# Patient Record
Sex: MALE | Race: WHITE | Employment: FULL TIME | URBAN - METROPOLITAN AREA
[De-identification: names, ages, dates, MRNs, and addresses within clinical notes are randomized per-mention and may not be internally consistent; named-entity substitution may affect disease eponyms.]

---

## 2017-07-12 ENCOUNTER — TELEPHONE (OUTPATIENT)
Dept: CARDIOLOGY CLINIC | Age: 32
End: 2017-07-12

## 2017-07-13 NOTE — TELEPHONE ENCOUNTER
Returned patient's call. LVM patient may come into the office in the am tomorrow for a 9 am. Appointment. Advised patient to arrived at 8:30am. Please call the office in the morning if unable to make that date/time.

## 2017-07-19 ENCOUNTER — OFFICE VISIT (OUTPATIENT)
Dept: CARDIOLOGY CLINIC | Age: 32
End: 2017-07-19

## 2017-07-19 VITALS
SYSTOLIC BLOOD PRESSURE: 134 MMHG | RESPIRATION RATE: 16 BRPM | BODY MASS INDEX: 26.25 KG/M2 | HEIGHT: 72 IN | DIASTOLIC BLOOD PRESSURE: 90 MMHG | WEIGHT: 193.8 LBS | HEART RATE: 86 BPM

## 2017-07-19 DIAGNOSIS — I10 ESSENTIAL HYPERTENSION: Primary | ICD-10-CM

## 2017-07-19 RX ORDER — CARVEDILOL 12.5 MG/1
12.5 TABLET ORAL 2 TIMES DAILY
COMMUNITY
Start: 2017-07-11 | End: 2017-08-11

## 2017-07-19 RX ORDER — LISINOPRIL AND HYDROCHLOROTHIAZIDE 20; 25 MG/1; MG/1
1 TABLET ORAL DAILY
Refills: 0 | COMMUNITY
Start: 2017-07-13 | End: 2017-11-01

## 2017-07-19 NOTE — LETTER
10/11/2017 12:03 PM 
 
Mr. Kamini [address] [facility] [address] Dear Kamini Beauchamp: 
 
Please find your most recent results below. Resulted Orders METABOLIC PANEL, COMPREHENSIVE Result Value Ref Range Glucose 92 65 - 99 mg/dL BUN 20 6 - 20 mg/dL Creatinine 1.24 0.76 - 1.27 mg/dL GFR est non-AA 75 >59 mL/min/1.73 GFR est AA 87 >59 mL/min/1.73  
 BUN/Creatinine ratio 16 9 - 20 Sodium 141 134 - 144 mmol/L Potassium 4.2 3.5 - 5.2 mmol/L Chloride 98 96 - 106 mmol/L  
 CO2 26 18 - 29 mmol/L Calcium 9.3 8.7 - 10.2 mg/dL Protein, total 7.0 6.0 - 8.5 g/dL Albumin 4.5 3.5 - 5.5 g/dL GLOBULIN, TOTAL 2.5 1.5 - 4.5 g/dL A-G Ratio 1.8 1.2 - 2.2 Bilirubin, total 0.4 0.0 - 1.2 mg/dL Alk. phosphatase 85 39 - 117 IU/L  
 AST (SGOT) 22 0 - 40 IU/L  
 ALT (SGPT) 30 0 - 44 IU/L Narrative Performed at:  [address]  [id number] : Edy Zhou MD, Phone:  [phone] LIPID PANEL Result Value Ref Range Cholesterol, total 261 (H) 100 - 199 mg/dL Triglyceride 304 (H) 0 - 149 mg/dL HDL Cholesterol 42 >39 mg/dL VLDL, calculated 61 (H) 5 - 40 mg/dL LDL, calculated 158 (H) 0 - 99 mg/dL Narrative Performed at:  [address]  [id number] : Edy Zhou MD, Phone:  [phone] MAGNESIUM Result Value Ref Range Magnesium 2.4 (H) 1.6 - 2.3 mg/dL Narrative Performed at:  [address]  [id number] : Edy Zhou MD, Phone:  [phone] CVD REPORT Result Value Ref Range INTERPRETATION Note Comment:  
   Supplement report is available. Narrative Performed at:  [address] [place] [address]  [id number] : Fadi Hess PhD, Phone:  [phone] RECOMMENDATIONS: 
 I strongly encourage you to work on diet and exercise, your LDL is two points away from needing medication for his high cholesterol. Please call me if you have any questions: [phone] Sincerely, Alicia Brown MD

## 2017-07-19 NOTE — MR AVS SNAPSHOT
Visit Information Date & Time Provider Department Dept. Phone Encounter #  
 7/19/2017  9:00 AM Darwin Washington MD [facility] Sara Perez [phone] [id number] Upcoming Health Maintenance Date Due DTaP/Tdap/Td series (1 - Tdap) 8/18/2004 INFLUENZA AGE 9 TO ADULT 8/1/2017 Allergies as of 7/19/2017  Review Complete On: 7/19/2017 By: Tamir Washington Severity Noted Reaction Type Reactions Penicillins  10/14/2015    Unknown (comments) Current Immunizations  Never Reviewed No immunizations on file. Not reviewed this visit You Were Diagnosed With   
  
 Codes Comments Essential hypertension    -  Primary ICD-10-CM: I10 
ICD-9-CM: 401.9 Vitals BP Pulse Resp Height(growth percentile) Weight(growth percentile) BMI  
 134/90 (BP 1 Location: Left arm, BP Patient Position: Sitting) 86 16 6' (1.829 m) 193 lb 12.8 oz (87.9 kg) 26.28 kg/m2 Smoking Status Never Smoker Vitals History BMI and BSA Data Body Mass Index Body Surface Area  
 26.28 kg/m 2 2.11 m 2 Your Updated Medication List  
  
   
This list is accurate as of: 7/19/17 10:26 AM.  Always use your most recent med list.  
  
  
  
  
 carvedilol 12.5 mg tablet Commonly known as:  Deleta Eleuterio Take 12.5 mg by mouth two (2) times a day. lisinopril-hydroCHLOROthiazide 20-25 mg per tablet Commonly known as:  Dalirving Brazen Take 1 Tab by mouth daily. We Performed the Following AMB POC EKG ROUTINE W/ 12 LEADS, INTER & REP [01581 CPT(R)] LIPID PANEL [50993 CPT(R)] MAGNESIUM [id number] CPT(R)] METABOLIC PANEL, COMPREHENSIVE [57715 CPT(R)] Introducing [facility]! Cory Shelby introduces [employer] patient portal. Now you can access parts of your medical record, email your doctor's office, and request medication refills online.    
 
1. In your internet browser, go to [email or web address] 2. Click on the First Time User? Click Here link in the Sign In box. You will see the New Member Sign Up page. 3. Enter your CrowdHall Access Code exactly as it appears below. You will not need to use this code after youve completed the sign-up process. If you do not sign up before the expiration date, you must request a new code. · [employer] Access Code: [email or web address] Expires: 10/17/2017 10:26 AM 
 
4. Enter the last four digits of your Social Security Number (xxxx) and Date of Birth (mm/dd/yyyy) as indicated and click Submit. You will be taken to the next sign-up page. 5. Create a GitHubt ID. This will be your CrowdHall login ID and cannot be changed, so think of one that is secure and easy to remember. 6. Create a CrowdHall password. You can change your password at any time. 7. Enter your Password Reset Question and Answer. This can be used at a later time if you forget your password. 8. Enter your e-mail address. You will receive e-mail notification when new information is available in [address] 9. Click Sign Up. You can now view and download portions of your medical record. 10. Click the Download Summary menu link to download a portable copy of your medical information. If you have questions, please visit the Frequently Asked Questions section of the CrowdHall website. Remember, [employer]dHall is NOT to be used for urgent needs. For medical emergencies, dial 911. Now available from your iPhone and Android! Please provide this summary of care documentation to your next provider. Your primary care clinician is listed as Glen Chapin. If you have any questions after today's visit, please call [phone].

## 2017-07-19 NOTE — PROGRESS NOTES
[place]: Issac Rivero  [phone]    HPI: Mitchell Bucio, a 33y.o. year-old who presents for evaluation of HTN. Last visit here 2 years ago. He was out of town and went to play golf, he felt bad, off and the BP was up at 220 over 120. He felt tired and lethargy. Had dietary indiscretions before, salt and etoh. He is warm today but otherwise doing ok. BP is down today. He was admitted to the hospital and got IV meds to bring his BP down. But was staying at 150/100. Ended up staying about a day. No palps, no chest pain. Records reviewed. Labs ok, trop neg, and will requested labs. He was able to get in to see [facility] Dr. Debo Reina. She prescribed his meds and she will see him again tomorrow. He is going to have a renal ultrasound. He is expecting this October, boy. Stress level is high. He is changing locations for his job. But he will be moving to a different territory. He has had palpitations before but has not had it that severe. No syncope, near-syncope  Not an exerciser, weight is stable. Not sick at the time. No aches, CK elevated. EKG today is NSR. Records, mychart, labs in 6-8 weeks, fu here 3 mos, record bp daily and keep a log    Assessment/Plan:  1. HTN- very high recently with ER eval, now on lisinoprilHCt and carvedilol  2. Body mass index is 26.28 kg/(m^2). 3. Dyslipidemia ? Will check NMR  4. Palpitations- holter if recurs, no syncope  5. Dyspnea/palpitations- echo to evaluate  6. Primary prevention- Lose it, Mediterannean, Exercise, Lipids, recheck in 6 mos  7. Stress/anxiety- discussed relaxation and exercise, contribution to palps and BP    Fhx mother with HTN, gm with CAD  Soc no tob +etoh 2-3 beers 3 times a week,   He  has no past medical history on file.     Cardiovascular ROS: positive for - dyspnea on exertion and irregular heartbeat  Respiratory ROS: no cough, shortness of breath, or wheezing  Neurological ROS: no TIA or stroke symptoms  All other systems negative except as above. PE  Vitals:    07/19/17 0915   BP: 134/90   Pulse: 86   Resp: 16   Weight: 193 lb 12.8 oz (87.9 kg)   Height: 6' (1.829 m)    Body mass index is 26.28 kg/(m^2). General appearance - alert, well appearing, and in no distress  Mental status - affect appropriate to mood  Eyes - sclera anicteric, moist mucous membranes  Neck - supple, no significant adenopathy  Lymphatics - no  lymphadenopathy  Chest - clear to auscultation, no wheezes, rales or rhonchi  Heart - normal rate, regular rhythm, normal S1, S2, no murmurs, rubs, clicks or gallops  Abdomen - soft, nontender, nondistended, no masses or organomegaly  Back exam - full range of motion, no tenderness  Neurological - cranial nerves II through XII grossly intact, no focal deficit  Musculoskeletal - no muscular tenderness noted, normal strength  Extremities - peripheral pulses normal, no pedal edema  Skin - normal coloration  no rashes    Recent Labs:  No results found for: CHOL, CHOLX, CHLST, CHOLV, 287019, HDL, LDL, LDLC, DLDLP, TGLX, TRIGL, TRIGP, CHHD, CHHDX  Lab Results   Component Value Date/Time    Creatinine 1.30 10/14/2015 01:11 AM     Lab Results   Component Value Date/Time    BUN 26 10/14/2015 01:11 AM     Lab Results   Component Value Date/Time    Potassium 3.6 10/14/2015 01:11 AM     No results found for: HBA1C, HGBE8, CPL7UKVD, WLC6DGSJ  Lab Results   Component Value Date/Time    HGB 15.2 10/14/2015 01:11 AM     Lab Results   Component Value Date/Time    PLATELET 243 47/74/5784 01:11 AM       Reviewed:  No past medical history on file.   History   Smoking Status    Never Smoker   Smokeless Tobacco    Never Used     History   Alcohol Use    0.0 oz/week    0 Standard drinks or equivalent per week     Comment: 2-3 times a week     Allergies   Allergen Reactions    Penicillins Unknown (comments)       Current Outpatient Prescriptions   Medication Sig    carvedilol (COREG) 12.5 mg tablet Take 12.5 mg by mouth two (2) times a day.  lisinopril-hydroCHLOROthiazide (PRINZIDE, ZESTORETIC) 20-25 mg per tablet Take 1 Tab by mouth daily. No current facility-administered medications for this visit.         Alicia Brown MD  [facility] [place]  [address] [facility] [address]  [address]

## 2017-07-28 ENCOUNTER — CLINICAL SUPPORT (OUTPATIENT)
Dept: CARDIOLOGY CLINIC | Age: 32
End: 2017-07-28

## 2017-07-28 DIAGNOSIS — R00.2 PALPITATIONS: Primary | ICD-10-CM

## 2017-07-28 DIAGNOSIS — R06.02 SOB (SHORTNESS OF BREATH): ICD-10-CM

## 2017-07-28 DIAGNOSIS — I10 ESSENTIAL HYPERTENSION: ICD-10-CM

## 2017-08-07 ENCOUNTER — TELEPHONE (OUTPATIENT)
Dept: CARDIOLOGY CLINIC | Age: 32
End: 2017-08-07

## 2017-08-08 NOTE — TELEPHONE ENCOUNTER
Returned patient's call:  Left VMM with the following:  Per Dr. Queenie Bahena patient's echo is ok. Normal systolic function. EF 55%    mild-moderate focal thickening of the anterior  Leaflet. triv MR/TR    Patient may return call with questions or concerns.

## 2017-08-09 ENCOUNTER — TELEPHONE (OUTPATIENT)
Dept: CARDIOLOGY CLINIC | Age: 32
End: 2017-08-09

## 2017-08-10 NOTE — TELEPHONE ENCOUNTER
Called patient regarding recent inquiry. Identity verified. Informed patient that we have his ultrasound results here in the office available for his office visit.

## 2017-10-10 LAB
ALBUMIN SERPL-MCNC: 4.5 G/DL (ref 3.5–5.5)
ALBUMIN/GLOB SERPL: 1.8 {RATIO} (ref 1.2–2.2)
ALP SERPL-CCNC: 85 IU/L (ref 39–117)
ALT SERPL-CCNC: 30 IU/L (ref 0–44)
AST SERPL-CCNC: 22 IU/L (ref 0–40)
BILIRUB SERPL-MCNC: 0.4 MG/DL (ref 0–1.2)
BUN SERPL-MCNC: 20 MG/DL (ref 6–20)
BUN/CREAT SERPL: 16 (ref 9–20)
CALCIUM SERPL-MCNC: 9.3 MG/DL (ref 8.7–10.2)
CHLORIDE SERPL-SCNC: 98 MMOL/L (ref 96–106)
CHOLEST SERPL-MCNC: 261 MG/DL (ref 100–199)
CO2 SERPL-SCNC: 26 MMOL/L (ref 18–29)
CREAT SERPL-MCNC: 1.24 MG/DL (ref 0.76–1.27)
GLOBULIN SER CALC-MCNC: 2.5 G/DL (ref 1.5–4.5)
GLUCOSE SERPL-MCNC: 92 MG/DL (ref 65–99)
HDLC SERPL-MCNC: 42 MG/DL
INTERPRETATION, 910389: NORMAL
LDLC SERPL CALC-MCNC: 158 MG/DL (ref 0–99)
MAGNESIUM SERPL-MCNC: 2.4 MG/DL (ref 1.6–2.3)
POTASSIUM SERPL-SCNC: 4.2 MMOL/L (ref 3.5–5.2)
PROT SERPL-MCNC: 7 G/DL (ref 6–8.5)
SODIUM SERPL-SCNC: 141 MMOL/L (ref 134–144)
TRIGL SERPL-MCNC: 304 MG/DL (ref 0–149)
VLDLC SERPL CALC-MCNC: 61 MG/DL (ref 5–40)

## 2017-10-11 NOTE — PROGRESS NOTES
Please strongly encourage him to work on diet and exercise, per Dr. Santos Strickland his LDL is two points away from needing medication for his high cholesterol.

## 2017-11-01 ENCOUNTER — OFFICE VISIT (OUTPATIENT)
Dept: CARDIOLOGY CLINIC | Age: 32
End: 2017-11-01

## 2017-11-01 VITALS
RESPIRATION RATE: 16 BRPM | OXYGEN SATURATION: 98 % | WEIGHT: 192.4 LBS | BODY MASS INDEX: 26.06 KG/M2 | SYSTOLIC BLOOD PRESSURE: 130 MMHG | HEIGHT: 72 IN | HEART RATE: 67 BPM | DIASTOLIC BLOOD PRESSURE: 80 MMHG

## 2017-11-01 DIAGNOSIS — I10 ESSENTIAL HYPERTENSION: Primary | ICD-10-CM

## 2017-11-01 DIAGNOSIS — E78.5 DYSLIPIDEMIA: ICD-10-CM

## 2017-11-01 RX ORDER — VALSARTAN AND HYDROCHLOROTHIAZIDE 160; 12.5 MG/1; MG/1
1 TABLET, FILM COATED ORAL DAILY
Qty: 30 TAB | Refills: 5 | Status: SHIPPED | OUTPATIENT
Start: 2017-11-01 | End: 2018-04-24 | Stop reason: DRUGHIGH

## 2017-11-01 RX ORDER — CARVEDILOL 12.5 MG/1
12.5 TABLET ORAL 2 TIMES DAILY WITH MEALS
COMMUNITY
End: 2017-11-01 | Stop reason: SDUPTHER

## 2017-11-01 RX ORDER — CARVEDILOL 12.5 MG/1
12.5 TABLET ORAL 2 TIMES DAILY
Qty: 180 TAB | Refills: 3 | Status: SHIPPED | OUTPATIENT
Start: 2017-11-01 | End: 2018-11-08 | Stop reason: SDUPTHER

## 2017-11-01 NOTE — PROGRESS NOTES
[place]:   [phone]    HPI: Sadaf Rizvi, a 27y.o. year-old who presents for follow up regarding his dyslipidemia and HTN. His blood pressure is much better now, readings in the 120/80 range  No headaches or dizziness  No syncope  No palpitations  No chest pain or dyspnea with exertion  No LE edema  Just had his first child on 10/25 - a baby boy, he got RSV but is doing ok now  He is not exercising currently, still try to adjust to his  and life as a dad  Discussed his lipids - he says he eats pretty well, never eats fast food, has a sandwich for lunch daily and a variety of meals for dinner  Sometimes eats pasta but mostly grilled chicken, other meats, vegetables  Only drinks 1-2 alcoholic beverages on the weekend  He reports having a dry cough since starting lisinopril HCT  PCP is now Dr. Emmanuel Hsu. Assessment/Plan:  1. HTN- well controlled on coreg 12.5mg BID and lisinopril HCT but having a cough on lisinopril, will stop lisinopril HCT today and begin valsartan /12.5mg daily, advised him to check his BP daily and call our office if his BP is consistently greater than 140/90, renal US ok, advised him to follow up with PCP in 2 months to check on his blood pressure, he will follow up here with Dr. Mila Benson in 1 year  2. Body mass index is 26.09 kg/(m^2). 3. Dyslipidemia - ,  in 10/17, advised him to begin exercising 30 minutes/day 5 days/week and limiting his carbohydrates some, will plan to check fasting lipids again 1 year  4. Palpitations- none recently, holter if recurs, no syncope  5. Dyspnea - resolved, TTE ok   6.  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 Cardiovascular ROS: negative for chest pain or dyspnea on exertion   Respiratory ROS: positive for cough  Neurological ROS: no TIA or stroke symptoms  All other systems negative except as above. PE  Vitals:    11/01/17 1305 11/01/17 1308   BP: 130/86 130/80   Pulse: 67    Resp: 16    SpO2: 98%    Weight: 192 lb 6.4 oz (87.3 kg)    Height: 6' (1.829 m)     Body mass index is 26.09 kg/(m^2).    General appearance - alert, well appearing, and in no distress  Mental status - affect appropriate to mood  Eyes - sclera anicteric, moist mucous membranes  Neck - supple, no significant adenopathy  Lymphatics - no  lymphadenopathy  Chest - clear to auscultation, no wheezes, rales or rhonchi  Heart - normal rate, regular rhythm, normal S1, S2, no murmurs, rubs, clicks or gallops  Abdomen - soft, nontender, nondistended, no masses or organomegaly  Back exam - full range of motion, no tenderness  Neurological - cranial nerves II through XII grossly intact, no focal deficit  Musculoskeletal - no muscular tenderness noted, normal strength  Extremities - peripheral pulses normal, no pedal edema  Skin - normal coloration  no rashes    Recent Labs:  Lab Results   Component Value Date/Time    Cholesterol, total 261 10/09/2017 08:40 AM    HDL Cholesterol 42 10/09/2017 08:40 AM    LDL, calculated 158 10/09/2017 08:40 AM    Triglyceride 304 10/09/2017 08:40 AM     Lab Results   Component Value Date/Time    Creatinine 1.24 10/09/2017 08:40 AM     Lab Results   Component Value Date/Time    BUN 20 10/09/2017 08:40 AM     Lab Results   Component Value Date/Time    Potassium 4.2 10/09/2017 08:40 AM     No results found for: HBA1C, HGBE8, YOY0WQLP, IIJ9YGYB  Lab Results   Component Value Date/Time    HGB 15.2 10/14/2015 01:11 AM     Lab Results   Component Value Date/Time    PLATELET 047 24/91/6480 01:11 AM       Reviewed:  Past Medical History: Diagnosis Date    Essential hypertension     Hyperlipidemia      History   Smoking Status    Never Smoker   Smokeless Tobacco    Never Used     History   Alcohol Use    0.0 oz/week    0 Standard drinks or equivalent per week     Comment: 2-3 times a week     Allergies   Allergen Reactions    Penicillins Unknown (comments)       Current Outpatient Prescriptions   Medication Sig    valsartan-hydroCHLOROthiazide (DIOVAN-HCT) 160-12.5 mg per tablet Take 1 Tab by mouth daily.  carvedilol (COREG) 12.5 mg tablet Take 1 Tab by mouth two (2) times a day. No current facility-administered medications for this visit.         EDUARD Valencia [facility] [place]  [address], [address] [facility] [place]  [place], [address] [place] [address]

## 2017-11-01 NOTE — PATIENT INSTRUCTIONS
Please stop taking lisinopril HCT and begin valsartan /12.5mg one tablet daily  Please check your blood pressure daily (at least one hour after your morning blood pressure medications.)  Keep a written record of your blood pressure readings and take readings to your primary care visit in 2 months. If your blood pressure is greater than 140/90 consistently please call our office so we can increase your medication. Please call the office prior to your annual appointment and request a lab slip to have your cholesterol rechecked. Please try to exercise 30 minutes/day 5 days/week and cut back on carbohydrates.

## 2017-11-01 NOTE — MR AVS SNAPSHOT
Visit Information Date & Time Provider Department Dept. Phone Encounter #  
 11/1/2017  1:00 PM Maddison Arrington, [phone] Upcoming Health Maintenance Date Due DTaP/Tdap/Td series (1 - Tdap) 8/18/2004 INFLUENZA AGE 9 TO ADULT 8/1/2017 Allergies as of 11/1/2017  Review Complete On: 11/1/2017 By: Leni Alavrez Severity Noted Reaction Type Reactions Penicillins  10/14/2015    Unknown (comments) Current Immunizations  Never Reviewed No immunizations on file. Not reviewed this visit You Were Diagnosed With   
  
 Codes Comments Essential hypertension    -  Primary ICD-10-CM: I10 
ICD-9-CM: 401.9 Vitals BP Pulse Resp Height(growth percentile) Weight(growth percentile) SpO2  
 130/80 (BP 1 Location: Right arm, BP Patient Position: Sitting) 67 16 6' (1.829 m) 192 lb 6.4 oz (87.3 kg) 98% BMI Smoking Status 26.09 kg/m2 Never Smoker Vitals History BMI and BSA Data Body Mass Index Body Surface Area 26.09 kg/m 2 2.11 m 2 Preferred Pharmacy Pharmacy Name Phone [facility] [address] [place], [place] [phone] Your Updated Medication List  
  
   
This list is accurate as of: 11/1/17  1:46 PM.  Always use your most recent med list.  
  
  
  
  
 carvedilol 12.5 mg tablet Commonly known as:  Gaylin Menan Take 1 Tab by mouth two (2) times a day. valsartan-hydroCHLOROthiazide 160-12.5 mg per tablet Commonly known as:  DIOVAN-HCT Take 1 Tab by mouth daily. Prescriptions Sent to Pharmacy Refills  
 valsartan-hydroCHLOROthiazide (DIOVAN-HCT) 160-12.5 mg per tablet 5 Sig: Take 1 Tab by mouth daily.   
 Class: Normal  
 Pharmacy: [facility] [address], [address] [place] [address] [address] AT [facility] of [address] [place] [address] Ph #: [phone] Route: Oral  
 carvedilol (COREG) 12.5 mg tablet 3 Sig: Take 1 Tab by mouth two (2) times a day. Class: Normal  
 Pharmacy: [facility] [address], [address] of [address] [place] [address] Ph #: [phone] Route: Oral  
  
We Performed the Following AMB POC EKG ROUTINE W/ 12 LEADS, INTER & REP [45364 CPT(R)] Introducing [facility]! Chuyita Cid introduces [employer] patient portal. Now you can access parts of your medical record, email your doctor's office, and request medication refills online. 1. In your internet browser, go to [email or web address] 2. Click on the First Time User? Click Here link in the Sign In box. You will see the New Member Sign Up page. 3. Enter your [employer] Access Code exactly as it appears below. You will not need to use this code after youve completed the sign-up process. If you do not sign up before the expiration date, you must request a new code. · [employer] Access Code: [email or web address] Expires: 1/30/2018  1:13 PM 
 
4. Enter the last four digits of your Social Security Number (xxxx) and Date of Birth (mm/dd/yyyy) as indicated and click Submit. You will be taken to the next sign-up page. 5. Create a [employer] ID. This will be your [employer] login ID and cannot be changed, so think of one that is secure and easy to remember. 6. Create a [employer] password. You can change your password at any time. 7. Enter your Password Reset Question and Answer. This can be used at a later time if you forget your password. 8. Enter your e-mail address. You will receive e-mail notification when new information is available in [address] 9. Click Sign Up. You can now view and download portions of your medical record. 10. Click the Download Summary menu link to download a portable copy of your medical information. If you have questions, please visit the Frequently Asked Questions section of the [employer] website. Remember, [employer] is NOT to be used for urgent needs. For medical emergencies, dial 911. Now available from your iPhone and Android! Please provide this summary of care documentation to your next provider. Your primary care clinician is listed as Carmencita Luz. If you have any questions after today's visit, please call [phone].

## 2017-12-11 RX ORDER — HYDROCHLOROTHIAZIDE 12.5 MG/1
12.5 TABLET ORAL DAILY
Qty: 30 TAB | Refills: 2 | Status: SHIPPED | OUTPATIENT
Start: 2017-12-11 | End: 2018-03-05 | Stop reason: SDUPTHER

## 2018-03-06 ENCOUNTER — TELEPHONE (OUTPATIENT)
Dept: CARDIOLOGY CLINIC | Age: 33
End: 2018-03-06

## 2018-03-06 ENCOUNTER — DOCUMENTATION ONLY (OUTPATIENT)
Dept: CARDIOLOGY CLINIC | Age: 33
End: 2018-03-06

## 2018-03-06 DIAGNOSIS — I10 ESSENTIAL HYPERTENSION: Primary | ICD-10-CM

## 2018-03-06 DIAGNOSIS — E78.5 HYPERLIPIDEMIA, UNSPECIFIED HYPERLIPIDEMIA TYPE: ICD-10-CM

## 2018-03-06 NOTE — TELEPHONE ENCOUNTER
Refilled his HCTZ today, please ask him to have BMP and magnesium level checked in the next month since we made changes to his BP meds in 11/17.     Future Appointments  Date Time Provider Nati Kennedy   11/2/2018 9:00 AM Merle Ruiz MD [address] [place] [address] patient. Left a message stating that lab slips will be ,mahi to his home with instruction to please have them drawn due to recent BP medication changes. Advised patient to call with any questions.

## 2018-03-06 NOTE — PROGRESS NOTES
Refilled his HCTZ today, please ask him to have BMP and magnesium level checked in the next month since we made changes to his BP meds in 11/17.     Future Appointments  Date Time Provider Nati Kennedy   11/2/2018 9:00 AM Linda Villeda  [address]

## 2018-03-16 ENCOUNTER — TELEPHONE (OUTPATIENT)
Dept: CARDIOLOGY CLINIC | Age: 33
End: 2018-03-16

## 2018-03-16 LAB
BUN SERPL-MCNC: 20 MG/DL (ref 6–20)
BUN/CREAT SERPL: 19 (ref 9–20)
CALCIUM SERPL-MCNC: 9.8 MG/DL (ref 8.7–10.2)
CHLORIDE SERPL-SCNC: 97 MMOL/L (ref 96–106)
CO2 SERPL-SCNC: 26 MMOL/L (ref 18–29)
CREAT SERPL-MCNC: 1.05 MG/DL (ref 0.76–1.27)
GFR SERPLBLD CREATININE-BSD FMLA CKD-EPI: 107 ML/MIN/1.73
GFR SERPLBLD CREATININE-BSD FMLA CKD-EPI: 92 ML/MIN/1.73
GLUCOSE SERPL-MCNC: 98 MG/DL (ref 65–99)
MAGNESIUM SERPL-MCNC: 2.3 MG/DL (ref 1.6–2.3)
POTASSIUM SERPL-SCNC: 4.3 MMOL/L (ref 3.5–5.2)
SODIUM SERPL-SCNC: 141 MMOL/L (ref 134–144)

## 2018-03-16 NOTE — TELEPHONE ENCOUNTER
----- Message from Kateryna Elizabeth NP sent at 3/16/2018 12:20 PM EDT -----  Labs look good, no changes needed

## 2018-03-16 NOTE — TELEPHONE ENCOUNTER
Called patient. Left a message on self identified voicemail with the following results. Advised patient to call with any questions.

## 2018-04-24 ENCOUNTER — PATIENT MESSAGE (OUTPATIENT)
Dept: CARDIOLOGY CLINIC | Age: 33
End: 2018-04-24

## 2018-04-24 DIAGNOSIS — E78.5 DYSLIPIDEMIA: Primary | ICD-10-CM

## 2018-04-24 DIAGNOSIS — I10 ESSENTIAL HYPERTENSION: ICD-10-CM

## 2018-04-24 RX ORDER — VALSARTAN AND HYDROCHLOROTHIAZIDE 320; 25 MG/1; MG/1
1 TABLET, FILM COATED ORAL DAILY
Qty: 90 TAB | Refills: 3 | Status: SHIPPED | OUTPATIENT
Start: 2018-04-24 | End: 2019-04-05 | Stop reason: SDUPTHER

## 2018-04-24 RX ORDER — AMLODIPINE BESYLATE 5 MG/1
5 TABLET ORAL DAILY
COMMUNITY
End: 2018-04-24 | Stop reason: SDUPTHER

## 2018-04-24 RX ORDER — AMLODIPINE BESYLATE 5 MG/1
5 TABLET ORAL DAILY
Qty: 90 TAB | Refills: 3 | Status: SHIPPED | OUTPATIENT
Start: 2018-04-24 | End: 2019-04-13 | Stop reason: SDUPTHER

## 2018-04-24 RX ORDER — VALSARTAN AND HYDROCHLOROTHIAZIDE 320; 25 MG/1; MG/1
1 TABLET, FILM COATED ORAL DAILY
COMMUNITY
End: 2018-04-24 | Stop reason: SDUPTHER

## 2018-04-24 NOTE — TELEPHONE ENCOUNTER
LVM for patient to return call at earliest convenience. Requested Prescriptions     Signed Prescriptions Disp Refills    valsartan-hydroCHLOROthiazide (DIOVAN HCT) 320-25 mg per tablet 90 Tab 3     Sig: Take 1 Tab by mouth daily. Authorizing Provider: Milton Moore     Ordering User: Aye Aguero    amLODIPine (NORVASC) 5 mg tablet 90 Tab 3     Sig: Take 1 Tab by mouth daily. Authorizing Provider: Milton Moore     Ordering User: Aye Aguero     My chart message sent regarding the following message:    stop HCTZ and increase Diovan HCT to 320/25 one tab daily.  Please advise him to begin amlodipine 5mg daily. He'll need Rx for both. Please ask him to see me in 2 months to check on BP and to have fasting lipids checked just prior to that visit to check his cholesterol.

## 2018-06-21 LAB
CHOLEST SERPL-MCNC: 300 MG/DL (ref 100–199)
HDLC SERPL-MCNC: 51 MG/DL
INTERPRETATION, 910389: NORMAL
LDLC SERPL CALC-MCNC: ABNORMAL MG/DL (ref 0–99)
TRIGL SERPL-MCNC: 410 MG/DL (ref 0–149)
VLDLC SERPL CALC-MCNC: ABNORMAL MG/DL (ref 5–40)

## 2018-06-22 ENCOUNTER — OFFICE VISIT (OUTPATIENT)
Dept: CARDIOLOGY CLINIC | Age: 33
End: 2018-06-22

## 2018-06-22 VITALS
WEIGHT: 203 LBS | HEIGHT: 72 IN | HEART RATE: 73 BPM | SYSTOLIC BLOOD PRESSURE: 120 MMHG | DIASTOLIC BLOOD PRESSURE: 80 MMHG | OXYGEN SATURATION: 98 % | BODY MASS INDEX: 27.5 KG/M2 | RESPIRATION RATE: 16 BRPM

## 2018-06-22 DIAGNOSIS — I10 ESSENTIAL HYPERTENSION: Primary | ICD-10-CM

## 2018-06-22 DIAGNOSIS — E78.1 HYPERTRIGLYCERIDEMIA: ICD-10-CM

## 2018-06-22 DIAGNOSIS — E78.2 MIXED HYPERLIPIDEMIA: ICD-10-CM

## 2018-06-22 NOTE — MR AVS SNAPSHOT
[address] [facility] [place] 
[phone] Patient: Kamini Beauchamp MRN: [id number] NIB:[id number] Visit Information Date & Time Provider Department Dept. Phone Encounter #  
 6/22/2018  1:20 PM Carla Guzmán, [phone] Your Appointments 11/2/2018  9:00 AM  
ESTABLISHED PATIENT with Saeid Boone MD  
[facility] ([facility]) Appt Note: 1 yr f/u [address] [place] [address]  
[address] [facility] [phone] Upcoming Health Maintenance Date Due DTaP/Tdap/Td series (1 - Tdap) 8/18/2004 Influenza Age 5 to Adult 8/1/2018 Allergies as of 6/22/2018  Review Complete On: 6/22/2018 By: Gadiel Pickering Severity Noted Reaction Type Reactions Lisinopril  11/01/2017    Cough Penicillins  10/14/2015    Unknown (comments) Current Immunizations  Never Reviewed No immunizations on file. Not reviewed this visit You Were Diagnosed With   
  
 Codes Comments Hyperlipidemia, unspecified hyperlipidemia type    -  Primary ICD-10-CM: E78.5 ICD-9-CM: 272.4 Hypertriglyceridemia     ICD-10-CM: E78.1 ICD-9-CM: 272.1 Vitals BP Pulse Resp Height(growth percentile) Weight(growth percentile) SpO2  
 120/80 (BP 1 Location: Left arm, BP Patient Position: Sitting) 73 16 6' (1.829 m) 203 lb (92.1 kg) 98% BMI Smoking Status 27.53 kg/m2 Never Smoker Vitals History BMI and BSA Data Body Mass Index Body Surface Area  
 27.53 kg/m 2 2.16 m 2 Preferred Pharmacy Pharmacy Name Phone [facility] [address] [place] [address], [address] [place] [address] [phone] Your Updated Medication List  
  
   
 This list is accurate as of 6/22/18  1:43 PM.  Always use your most recent med list. amLODIPine 5 mg tablet Commonly known as:  Padma Henriquez Take 1 Tab by mouth daily. carvedilol 12.5 mg tablet Commonly known as:  Kelechi Augustaer Take 1 Tab by mouth two (2) times a day. valsartan-hydroCHLOROthiazide 320-25 mg per tablet Commonly known as:  DIOVAN HCT Take 1 Tab by mouth daily. We Performed the Following LIPID PANEL [21640 CPT(R)] METABOLIC PANEL, COMPREHENSIVE [67568 CPT(R)] Patient Instructions Please have fasting labs drawn 1 week before your appointment with Dr. Coleman Hodges Please cut back on carbohydrate intake and increase your exercise to 30-45 minutes/day to help with your triglycerides You can try cutting your carvedilol tablet in half and taking half of a tablet twice daily for two weeks Please check your blood pressure daily for two weeks and then call the office with your readings to see if we can decrease your dose further Introducing [facility]! Dear Patrick Kellogg: Thank you for requesting a [employer] account. Our records indicate that you already have an active [employer] account. You can access your account anytime at [email or web address] Did you know that you can access your hospital and ER discharge instructions at any time in [employer]? You can also review all of your test results from your hospital stay or ER visit. Additional Information If you have questions, please visit the Frequently Asked Questions section of the [employer] website at [email or web address] Remember, [employer] is NOT to be used for urgent needs. For medical emergencies, dial 911. Now available from your iPhone and Android! Please provide this summary of care documentation to your next provider. Your primary care clinician is listed as Greg Calvillo.  If you have any questions after today's visit, please call [phone].

## 2018-06-22 NOTE — PROGRESS NOTES
[place]:   [phone]    HPI: Chichi Calderon, a 27y.o. year-old who presents for follow up regarding his dyslipidemia and HTN. He is feeling well, BP is well controlled at home  Reviewed his recent lab results - TG are more elevated and unable to calculate his LDL due to hypertriglyceridemia  He admits that he is not exercising at this time, [place] [employer] [facility] [employer] [facility]  [employer] occasionally but overall doesn't think that he eats carbs often  Only drinks 1-2 alcoholic beverages on the weekend  Eats a sandwich every day for lunch with macaroni salad and string cheese  Says he doesn't eat many sweets  No headaches, no dizziness or syncope  No palpitations  No chest pain or dyspnea with exertion  No LE edema  Son born in 10/2017 is doing well  Sometimes eats pasta but mostly grilled chicken, other meats, vegetables  His weight is up 11 lbs since his last visit     Assessment/Plan:  1. HTN- well controlled on coreg 12.5mg BID, valsartan /12.5mg daily, amlodipine 5mg daily  -had cough on lisinopril  -he would like to try cutting back on anti-hypertensive medication if possible, advised him to decrease his coreg dose to 6.25mg BID x 2 weeks and check his BP daily x 2 weeks, asked him to call the office in 2 weeks with his readings and then we can decide if his coreg dose can be lowered further  -encouraged him to cut carbs and begin exercising regularly   -he will follow up here with Dr. lAex Foley in November 2018   2. Body mass index is 27.53 kg/(m^2). 3. Dyslipidemia - Lipids 6/18 - , , no LDL, HDL 51, advised him to begin exercising 30-45 minutes/day, advised him to cut back on his carbohydrate intake, advised him to track his carb intake on the [employer] to help keep his carb intake low  -will plan to check fasting lipids in late October 2018 before his follow up visit   4. Palpitations- none recently, holter if recurs, no syncope  5. Dyspnea - resolved, TTE ok   6. Stress/anxiety- not discussed today, encouraged him to begin exercising regularly     Echo 7/17 - LVEF 60 % to 65 %, no WMA, wall thickness was at the upper limits of normal, mild-moderate focal thickening of the anterior leaflet on mitral valve    Fhx mother with HTN, gm with CAD  Soc Hx: no tobacco use, drinks 1-2 drinks/week, no drug use,     He  has a past medical history of Essential hypertension and Hyperlipidemia. Cardiovascular ROS: negative for chest pain or dyspnea on exertion   Respiratory ROS: no cough or wheezing   Neurological ROS: no TIA or stroke symptoms  All other systems negative except as above. PE  Vitals:    06/22/18 1319   BP: 120/80   Pulse: 73   Resp: 16   SpO2: 98%   Weight: 203 lb (92.1 kg)   Height: 6' (1.829 m)    Body mass index is 27.53 kg/(m^2).    General appearance - alert, well appearing, and in no distress  Mental status - affect appropriate to mood  Eyes - sclera anicteric, moist mucous membranes  Neck - supple, no carotid bruits   Lymphatics - not assessed   Chest - clear to auscultation, no wheezes, rales or rhonchi  Heart - normal rate, regular rhythm, normal S1, S2, no murmurs, rubs, clicks or gallops  Abdomen - soft, nontender, nondistended, no masses or organomegaly  Back exam - full range of motion, no tenderness  Neurological - cranial nerves II through XII grossly intact, no focal deficit  Musculoskeletal - no muscular tenderness noted, normal strength  Extremities - peripheral pulses normal, no pedal edema  Skin - normal coloration  no rashes    Recent Labs:  Lab Results   Component Value Date/Time    Cholesterol, total 300 (H) 06/20/2018 08:28 AM    HDL Cholesterol 51 06/20/2018 08:28 AM    LDL, calculated Comment 06/20/2018 08:28 AM    Triglyceride 410 (H) 06/20/2018 08:28 AM     Lab Results   Component Value Date/Time    Creatinine 1.05 03/15/2018 09:21 AM     Lab Results   Component Value Date/Time    BUN 20 03/15/2018 09:21 AM     Lab Results   Component Value Date/Time    Potassium 4.3 03/15/2018 09:21 AM     No results found for: HBA1C, HGBE8, FAA6SWXC, JRN3FWWS  Lab Results   Component Value Date/Time    HGB 15.2 10/14/2015 01:11 AM     Lab Results   Component Value Date/Time    PLATELET 374 81/89/3843 01:11 AM       Reviewed:  Past Medical History:   Diagnosis Date    Essential hypertension     Hyperlipidemia      History   Smoking Status    Never Smoker   Smokeless Tobacco    Never Used     History   Alcohol Use    0.0 oz/week    0 Standard drinks or equivalent per week     Comment: 2-3 times a week     Allergies   Allergen Reactions    Lisinopril Cough    Penicillins Unknown (comments)       Current Outpatient Prescriptions   Medication Sig    valsartan-hydroCHLOROthiazide (DIOVAN HCT) 320-25 mg per tablet Take 1 Tab by mouth daily.  amLODIPine (NORVASC) 5 mg tablet Take 1 Tab by mouth daily.  carvedilol (COREG) 12.5 mg tablet Take 1 Tab by mouth two (2) times a day. No current facility-administered medications for this visit.         Phu Smiley NP  [facility] [employer]  [address], [address] [facility], [address] [place] [address]

## 2018-06-22 NOTE — PATIENT INSTRUCTIONS
Please have fasting labs drawn 1 week before your appointment with Dr. Cristian Tejada  Please cut back on carbohydrate intake and increase your exercise to 30-45 minutes/day to help with your triglycerides    You can try cutting your carvedilol tablet in half and taking half of a tablet twice daily for two weeks  Please check your blood pressure daily for two weeks and then call the office with your readings to see if we can decrease your dose further

## 2018-10-27 LAB
ALBUMIN SERPL-MCNC: 4.9 G/DL (ref 3.5–5.5)
ALBUMIN/GLOB SERPL: 2.2 {RATIO} (ref 1.2–2.2)
ALP SERPL-CCNC: 75 IU/L (ref 39–117)
ALT SERPL-CCNC: 35 IU/L (ref 0–44)
AST SERPL-CCNC: 20 IU/L (ref 0–40)
BILIRUB SERPL-MCNC: 0.4 MG/DL (ref 0–1.2)
BUN SERPL-MCNC: 23 MG/DL (ref 6–20)
BUN/CREAT SERPL: 18 (ref 9–20)
CALCIUM SERPL-MCNC: 9.7 MG/DL (ref 8.7–10.2)
CHLORIDE SERPL-SCNC: 98 MMOL/L (ref 96–106)
CHOLEST SERPL-MCNC: 246 MG/DL (ref 100–199)
CO2 SERPL-SCNC: 25 MMOL/L (ref 20–29)
CREAT SERPL-MCNC: 1.26 MG/DL (ref 0.76–1.27)
GLOBULIN SER CALC-MCNC: 2.2 G/DL (ref 1.5–4.5)
GLUCOSE SERPL-MCNC: 106 MG/DL (ref 65–99)
HDLC SERPL-MCNC: 37 MG/DL
INTERPRETATION, 910389: NORMAL
LDLC SERPL CALC-MCNC: 145 MG/DL (ref 0–99)
POTASSIUM SERPL-SCNC: 4.6 MMOL/L (ref 3.5–5.2)
PROT SERPL-MCNC: 7.1 G/DL (ref 6–8.5)
SODIUM SERPL-SCNC: 138 MMOL/L (ref 134–144)
TRIGL SERPL-MCNC: 322 MG/DL (ref 0–149)
VLDLC SERPL CALC-MCNC: 64 MG/DL (ref 5–40)

## 2018-11-02 ENCOUNTER — OFFICE VISIT (OUTPATIENT)
Dept: CARDIOLOGY CLINIC | Age: 33
End: 2018-11-02

## 2018-11-02 VITALS
WEIGHT: 196.8 LBS | RESPIRATION RATE: 16 BRPM | HEART RATE: 66 BPM | SYSTOLIC BLOOD PRESSURE: 144 MMHG | BODY MASS INDEX: 26.66 KG/M2 | DIASTOLIC BLOOD PRESSURE: 100 MMHG | HEIGHT: 72 IN

## 2018-11-02 DIAGNOSIS — E78.1 HYPERTRIGLYCERIDEMIA: ICD-10-CM

## 2018-11-02 DIAGNOSIS — E78.5 DYSLIPIDEMIA: ICD-10-CM

## 2018-11-02 DIAGNOSIS — E11.59 TYPE 2 DIABETES MELLITUS WITH OTHER CIRCULATORY COMPLICATION, WITHOUT LONG-TERM CURRENT USE OF INSULIN (HCC): ICD-10-CM

## 2018-11-02 DIAGNOSIS — I10 HYPERTENSION, UNSPECIFIED TYPE: Primary | ICD-10-CM

## 2018-11-02 DIAGNOSIS — G47.33 OSA (OBSTRUCTIVE SLEEP APNEA): ICD-10-CM

## 2018-11-02 DIAGNOSIS — I10 ESSENTIAL HYPERTENSION: ICD-10-CM

## 2018-11-02 DIAGNOSIS — E78.2 MIXED HYPERLIPIDEMIA: ICD-10-CM

## 2018-11-02 DIAGNOSIS — R00.2 PALPITATIONS: ICD-10-CM

## 2018-11-02 DIAGNOSIS — R06.09 DOE (DYSPNEA ON EXERTION): ICD-10-CM

## 2018-11-02 DIAGNOSIS — I10 BENIGN ESSENTIAL HYPERTENSION: ICD-10-CM

## 2018-11-02 DIAGNOSIS — E78.5 HYPERLIPIDEMIA, UNSPECIFIED HYPERLIPIDEMIA TYPE: ICD-10-CM

## 2018-11-02 NOTE — PROGRESS NOTES
[place]:   [phone]    HPI: Artem Espinosa, a 26y.o. year-old who presents for follow up regarding his dyslipidemia and HTN. Son is doing great, he is 13 mos now. Walking and getting in to things. He feels like he is better on track, eating better, salads for lunch, etc.   Not exercising. But Tg down about 90 points and Cholesterol is down 50. He has a tough schedule. Up at 10, takes son to , then to work, gets home at 6:30. He is feeling well, BP is well controlled at home  He admits that he is not exercising at this time, [place] [employer] [place]    Says he doesn't eat many sweets  No headaches, no dizziness or syncope, palpitations  No chest pain or dyspnea with exertion, no LE edema  Son born in 10/2017 is doing well  Sometimes eats pasta but mostly grilled chicken, other meats, vegetables  His weight is down about 7 pounds from last visit    Assessment/Plan:  1. HTN- well controlled on coreg 12.5mg BID, valsartan /12.5mg daily, amlodipine 5mg daily  -had cough on lisinopril  -encouraged him to cut carbs and begin exercising regularly   2. Body mass index is 26.69 kg/m². 3. Dyslipidemia - Lipids a little better but not at goal, advised him to begin exercising 30-45 minutes/day, advised him to cut back on his carbohydrate intake, advised him to track his carb intake on the [employer] to help keep his carb intake low  4. Palpitations- none recently, holter if recurs, no syncope  5. Dyspnea - resolved, TTE ok   6.  Stress/anxiety- not discussed today, encouraged him to begin exercising regularly     Echo 7/17 - LVEF 60 % to 65 %, no WMA, wall thickness was at the upper limits of normal, mild-moderate focal thickening of the anterior leaflet on mitral valve    Fhx mother with HTN, gm with CAD  Soc Hx: no tobacco use, drinks 1-2 drinks/week, no drug use,     He  has a past medical history of Essential hypertension and Hyperlipidemia. Cardiovascular ROS: negative for chest pain or dyspnea on exertion   Respiratory ROS: no cough or wheezing   Neurological ROS: no TIA or stroke symptoms  All other systems negative except as above. PE  Vitals:    11/02/18 0916   BP: (!) 144/100   Pulse: 66   Resp: 16   Weight: 196 lb 12.8 oz (89.3 kg)   Height: 6' (1.829 m)    Body mass index is 26.69 kg/m².    General appearance - alert, well appearing, and in no distress  Mental status - affect appropriate to mood  Eyes - sclera anicteric, moist mucous membranes  Neck - supple, no carotid bruits   Lymphatics - not assessed   Chest - clear to auscultation, no wheezes, rales or rhonchi  Heart - normal rate, regular rhythm, normal S1, S2, no murmurs, rubs, clicks or gallops  Abdomen - soft, nontender, nondistended, no masses or organomegaly  Back exam - full range of motion, no tenderness  Neurological - cranial nerves II through XII grossly intact, no focal deficit  Musculoskeletal - no muscular tenderness noted, normal strength  Extremities - peripheral pulses normal, no pedal edema  Skin - normal coloration  no rashes    Recent Labs:  Lab Results   Component Value Date/Time    Cholesterol, total 246 (H) 10/26/2018 08:48 AM    HDL Cholesterol 37 (L) 10/26/2018 08:48 AM    LDL, calculated 145 (H) 10/26/2018 08:48 AM    Triglyceride 322 (H) 10/26/2018 08:48 AM     Lab Results   Component Value Date/Time    Creatinine 1.26 10/26/2018 08:48 AM     Lab Results   Component Value Date/Time    BUN 23 (H) 10/26/2018 08:48 AM     Lab Results   Component Value Date/Time    Potassium 4.6 10/26/2018 08:48 AM     No results found for: HBA1C, HGBE8, PEA0ENAA, CBK2BKKF  Lab Results   Component Value Date/Time    HGB 15.2 10/14/2015 01:11 AM     Lab Results   Component Value Date/Time    PLATELET 849 73/68/7111 01:11 AM       Reviewed:  Past Medical History:   Diagnosis Date    Essential hypertension     Hyperlipidemia      Social History     Tobacco Use Smoking Status Never Smoker   Smokeless Tobacco Never Used     Social History     Substance and Sexual Activity   Alcohol Use Yes    Alcohol/week: 0.0 oz    Comment: 2-3 times a week     Allergies   Allergen Reactions    Lisinopril Cough    Penicillins Unknown (comments)       Current Outpatient Medications   Medication Sig    valsartan-hydroCHLOROthiazide (DIOVAN HCT) 320-25 mg per tablet Take 1 Tab by mouth daily.  amLODIPine (NORVASC) 5 mg tablet Take 1 Tab by mouth daily.  carvedilol (COREG) 12.5 mg tablet Take 1 Tab by mouth two (2) times a day. No current facility-administered medications for this visit.         Lien Fiore MD  [facility] and Vascular [place]  [address], [address] [facility] [address]  [address]

## 2019-04-26 LAB
ALBUMIN SERPL-MCNC: 4.7 G/DL (ref 3.5–5.5)
ALBUMIN/GLOB SERPL: 2.1 {RATIO} (ref 1.2–2.2)
ALP SERPL-CCNC: 96 IU/L (ref 39–117)
ALT SERPL-CCNC: 24 IU/L (ref 0–44)
AST SERPL-CCNC: 18 IU/L (ref 0–40)
BILIRUB SERPL-MCNC: 0.3 MG/DL (ref 0–1.2)
BUN SERPL-MCNC: 26 MG/DL (ref 6–20)
BUN/CREAT SERPL: 19 (ref 9–20)
CALCIUM SERPL-MCNC: 9.7 MG/DL (ref 8.7–10.2)
CHLORIDE SERPL-SCNC: 101 MMOL/L (ref 96–106)
CHOLEST SERPL-MCNC: 267 MG/DL (ref 100–199)
CO2 SERPL-SCNC: 24 MMOL/L (ref 20–29)
CREAT SERPL-MCNC: 1.39 MG/DL (ref 0.76–1.27)
GLOBULIN SER CALC-MCNC: 2.2 G/DL (ref 1.5–4.5)
GLUCOSE SERPL-MCNC: 98 MG/DL (ref 65–99)
HDLC SERPL-MCNC: 40 MG/DL
INTERPRETATION, 910389: NORMAL
LDLC SERPL CALC-MCNC: 180 MG/DL (ref 0–99)
Lab: NORMAL
MAGNESIUM SERPL-MCNC: 2.3 MG/DL (ref 1.6–2.3)
POTASSIUM SERPL-SCNC: 4.7 MMOL/L (ref 3.5–5.2)
PROT SERPL-MCNC: 6.9 G/DL (ref 6–8.5)
SODIUM SERPL-SCNC: 141 MMOL/L (ref 134–144)
TRIGL SERPL-MCNC: 237 MG/DL (ref 0–149)
TSH SERPL DL<=0.005 MIU/L-ACNC: 3.14 UIU/ML (ref 0.45–4.5)
VLDLC SERPL CALC-MCNC: 47 MG/DL (ref 5–40)

## 2019-05-02 ENCOUNTER — OFFICE VISIT (OUTPATIENT)
Dept: CARDIOLOGY CLINIC | Age: 34
End: 2019-05-02

## 2019-05-02 VITALS
BODY MASS INDEX: 26.6 KG/M2 | DIASTOLIC BLOOD PRESSURE: 70 MMHG | HEART RATE: 76 BPM | WEIGHT: 196.4 LBS | HEIGHT: 72 IN | OXYGEN SATURATION: 99 % | SYSTOLIC BLOOD PRESSURE: 110 MMHG | RESPIRATION RATE: 16 BRPM

## 2019-05-02 DIAGNOSIS — E78.5 DYSLIPIDEMIA: Primary | ICD-10-CM

## 2019-05-02 NOTE — PROGRESS NOTES
[place]:   [phone]    HPI: Rahul Huggins, a 26y.o. year-old who presents for follow up regarding his dyslipidemia and HTN. Son is doing great, he is 19 mos now. Walking and getting in to things. Having fun with him. He feels like he is better on track, eating better, salads for lunch, etc.   Reviewed his diet and exercise program.     Not exercising. But TG down about 90 points and Cholesterol is down 50. He has a tough schedule. Up at 10, takes son to , then to work, gets home at 6:30. He is feeling well, BP is well controlled at home  He admits that he is not exercising at this time, [facility] [employer] for [facility]    Says he doesn't eat many sweets  No headaches, no dizziness or syncope, palpitations  No chest pain or dyspnea with exertion, no LE edema  Son born in 10/2017 is doing well  Sometimes eats pasta but mostly grilled chicken, other meats, vegetables  His weight is down about 7 pounds from last visit    Discussion today with BP meds- prefers to avoid changes for now. Exercise, lifestyle. Diet control. Assessment/Plan:  1. HTN- well controlled on coreg 12.5mg BID, valsartan /12.5mg daily, amlodipine 5mg daily  -had cough on lisinopril  -encouraged him to cut carbs and begin exercising regularly   2. Body mass index is 26.64 kg/m². 3. Dyslipidemia - Lipids a little better but not at goal, advised him to begin exercising 30-45 minutes/day, advised him to cut back on his carbohydrate intake, advised him to track his carb intake on the [employer] to help keep his carb intake low  4. Palpitations- none recently, holter if recurs, no syncope  5. Dyspnea - resolved, TTE ok   6.  Stress/anxiety- not discussed today, encouraged him to begin exercising regularly     Echo 7/17 - LVEF 60 % to 65 %, no WMA, wall thickness was at the upper limits of normal, mild-moderate focal thickening of the anterior leaflet on mitral valve  Fhx mother with HTN, gm with CAD  Soc Hx: no tobacco use, drinks 1-2 drinks/week, no drug use,     He  has a past medical history of Essential hypertension and Hyperlipidemia. Cardiovascular ROS: negative for chest pain or dyspnea on exertion   Respiratory ROS: no cough or wheezing   Neurological ROS: no TIA or stroke symptoms  All other systems negative except as above. PE  Vitals:    05/02/19 0858   BP: 110/70   Pulse: 76   Resp: 16   SpO2: 99%   Weight: 196 lb 6.4 oz (89.1 kg)   Height: 6' (1.829 m)    Body mass index is 26.64 kg/m².    General appearance - alert, well appearing, and in no distress  Mental status - affect appropriate to mood  Eyes - sclera anicteric, moist mucous membranes  Neck - supple, no carotid bruits   Lymphatics - not assessed   Chest - clear to auscultation, no wheezes, rales or rhonchi  Heart - normal rate, regular rhythm, normal S1, S2, no murmurs, rubs, clicks or gallops  Abdomen - soft, nontender, nondistended, no masses or organomegaly  Back exam - full range of motion, no tenderness  Neurological - cranial nerves II through XII grossly intact, no focal deficit  Musculoskeletal - no muscular tenderness noted, normal strength  Extremities - peripheral pulses normal, no pedal edema  Skin - normal coloration  no rashes    Recent Labs:  Lab Results   Component Value Date/Time    Cholesterol, total 267 (H) 04/25/2019 09:04 AM    HDL Cholesterol 40 04/25/2019 09:04 AM    LDL, calculated 180 (H) 04/25/2019 09:04 AM    Triglyceride 237 (H) 04/25/2019 09:04 AM     Lab Results   Component Value Date/Time    Creatinine 1.39 (H) 04/25/2019 09:04 AM     Lab Results   Component Value Date/Time    BUN 26 (H) 04/25/2019 09:04 AM     Lab Results   Component Value Date/Time    Potassium 4.7 04/25/2019 09:04 AM     No results found for: HBA1C, HGBE8, HFN2ATAH, EPZ2YINZ  Lab Results   Component Value Date/Time    HGB 15.2 10/14/2015 01:11 AM     Lab Results   Component Value Date/Time    PLATELET 477 10/14/2015 01:11 AM       Reviewed:  Past Medical History:   Diagnosis Date    Essential hypertension     Hyperlipidemia      Social History     Tobacco Use   Smoking Status Never Smoker   Smokeless Tobacco Never Used     Social History     Substance and Sexual Activity   Alcohol Use Yes    Alcohol/week: 0.0 oz    Frequency: 2-3 times a week    Drinks per session: 1 or 2    Binge frequency: Never    Comment: 2-3 times a week     Allergies   Allergen Reactions    Lisinopril Cough    Penicillins Unknown (comments)       Current Outpatient Medications   Medication Sig    amLODIPine (NORVASC) 5 mg tablet TAKE 1 TABLET BY MOUTH DAILY    valsartan-hydroCHLOROthiazide (DIOVAN-HCT) 320-25 mg per tablet TAKE 1 TABLET BY MOUTH DAILY    carvedilol (COREG) 12.5 mg tablet TAKE 1 TABLET BY MOUTH TWICE DAILY     No current facility-administered medications for this visit.         Emily Lackey MD  [facility] and Vascular [place]  [address] [facility] [address]  [address]

## 2019-11-02 LAB
CHOLEST SERPL-MCNC: 260 MG/DL (ref 100–199)
HDLC SERPL-MCNC: 40 MG/DL
INTERPRETATION, 910389: NORMAL
LDLC SERPL CALC-MCNC: 173 MG/DL (ref 0–99)
TRIGL SERPL-MCNC: 233 MG/DL (ref 0–149)
VLDLC SERPL CALC-MCNC: 47 MG/DL (ref 5–40)

## 2019-11-07 ENCOUNTER — OFFICE VISIT (OUTPATIENT)
Dept: CARDIOLOGY CLINIC | Age: 34
End: 2019-11-07

## 2019-11-07 VITALS
SYSTOLIC BLOOD PRESSURE: 121 MMHG | WEIGHT: 194 LBS | BODY MASS INDEX: 26.28 KG/M2 | DIASTOLIC BLOOD PRESSURE: 84 MMHG | RESPIRATION RATE: 15 BRPM | OXYGEN SATURATION: 98 % | HEIGHT: 72 IN | HEART RATE: 78 BPM

## 2019-11-07 DIAGNOSIS — I10 ESSENTIAL HYPERTENSION: Primary | ICD-10-CM

## 2019-11-07 DIAGNOSIS — E78.1 HYPERTRIGLYCERIDEMIA: ICD-10-CM

## 2019-11-07 DIAGNOSIS — G47.33 OSA (OBSTRUCTIVE SLEEP APNEA): ICD-10-CM

## 2019-11-07 DIAGNOSIS — E78.5 DYSLIPIDEMIA: ICD-10-CM

## 2019-11-07 NOTE — PROGRESS NOTES
[facility] [address]:   [phone]    HPI: Delvin Mart, a 37y.o. year-old who presents for follow up regarding his dyslipidemia and HTN. 30 min spent in counseling on healthy lifestyle. He is feeling well  No chest pain or dyspnea with exertion  No palpitations  No dizziness or syncope  No LE edema   Reviewed cholesterol readings - he is not exercising  Discussed ways to incorporate exercise into his daily routine  Strongly encouraged daily exercise for his cholesterol   He is doing well with his diet, eats a chopped salad with chicken breast every day for lunch, dinners are usually chicken or pork loin, eats asparagus, sweet potatoes, etc  Eats a rare pizza on Friday but doesn't eat out a lot   He has cut out sweets, ice cream, etc  Cut out ice cream  BP readings at home - 121/84, 135/85  Commutes 3 days/week   Son born in 10/2017 and is doing well, wife is now home with him. Reviewed need to get exercise in placehis plan will be to get up at 5 go to [place] run on treadmill 30 min and then come home before his wife goes to exercise at 6. Then try for a walk outside at lunch 15 min. He will also keep a food diary for me 2 weeks  before his next visit and we will review when he comes in. Assessment/Plan:  1. HTN- well controlled on coreg 12.5mg BID, valsartan /12.5mg daily in the morning, amlodipine 5mg daily in the evening   -had cough on lisinopril  -well controlled on home readings  2. Body mass index is 26.31 kg/m². 3. Dyslipidemia - ,  on recent lipid panel  -doing well with diet, spent a lot of time discussing how to incorporate exercise into his daily life  -he would like to avoid medication if possible so will work on the exercise portion, advised him to exercise 30-45 minutes/day  -asked him to keep 2 week food diary   -will check fasting lipids in 8 weeks  -he will follow up here in 8-10 weeks   4. Palpitations- none recently, holter if recurs, no syncope  5. Dyspnea - resolved, TTE ok   6. Stress/anxiety- not discussed today, encouraged him to begin exercising regularly     Echo 7/17 - LVEF 60 % to 65 %, no WMA, wall thickness was at the upper limits of normal, mild-moderate focal thickening of the anterior leaflet on mitral valve    Fhx mother with HTN, gm with CAD  Soc Hx: no tobacco use, drinks 1-2 drinks/week, no drug use,     He  has a past medical history of Essential hypertension and Hyperlipidemia. Cardiovascular ROS: negative for chest pain or dyspnea on exertion   Respiratory ROS: no cough or wheezing   Neurological ROS: no TIA or stroke symptoms  All other systems negative except as above. PE  Vitals:    11/07/19 0910 11/07/19 0951   BP: 144/88 121/84   Pulse: 78    Resp: 15    SpO2: 98%    Weight: 194 lb (88 kg)    Height: 6' (1.829 m)     Body mass index is 26.31 kg/m².    General appearance - alert, well appearing, and in no distress  Mental status - affect appropriate to mood  Eyes - sclera anicteric, moist mucous membranes  Neck - supple, no carotid bruits   Lymphatics - not assessed   Chest - clear to auscultation, no wheezes, rales or rhonchi  Heart - normal rate, regular rhythm, normal S1, S2, no murmurs, rubs, clicks or gallops  Abdomen - soft, nontender, nondistended  Back exam - full range of motion  Neurological - cranial nerves II through XII grossly intact, no focal deficit  Musculoskeletal - normal strength  Extremities - peripheral pulses normal, no pedal edema  Skin - normal coloration  no rashes    12 lead ECG: NSR    Recent Labs:  Lab Results   Component Value Date/Time    Cholesterol, total 260 (H) 11/01/2019 09:08 AM    HDL Cholesterol 40 11/01/2019 09:08 AM    LDL, calculated 173 (H) 11/01/2019 09:08 AM    Triglyceride 233 (H) 11/01/2019 09:08 AM     Lab Results   Component Value Date/Time    Creatinine 1.39 (H) 04/25/2019 09:04 AM     Lab Results   Component Value Date/Time    BUN 26 (H) 04/25/2019 09:04 AM Lab Results   Component Value Date/Time    Potassium 4.7 04/25/2019 09:04 AM     No results found for: HBA1C, HGBE8, LDL7BOMV, DEW6GYCH  Lab Results   Component Value Date/Time    HGB 15.2 10/14/2015 01:11 AM     Lab Results   Component Value Date/Time    PLATELET 904 89/96/7515 01:11 AM       Reviewed:  Past Medical History:   Diagnosis Date    Essential hypertension     Hyperlipidemia      Social History     Tobacco Use   Smoking Status Never Smoker   Smokeless Tobacco Never Used     Social History     Substance and Sexual Activity   Alcohol Use Yes    Alcohol/week: 0.0 standard drinks    Frequency: 2-3 times a week    Drinks per session: 1 or 2    Binge frequency: Never    Comment: 2-3 times a week     Allergies   Allergen Reactions    Lisinopril Cough    Penicillins Unknown (comments)       Current Outpatient Medications   Medication Sig    carvedilol (COREG) 12.5 mg tablet TAKE 1 TABLET BY MOUTH TWICE DAILY    amLODIPine (NORVASC) 5 mg tablet TAKE 1 TABLET BY MOUTH DAILY    valsartan-hydroCHLOROthiazide (DIOVAN-HCT) 320-25 mg per tablet TAKE 1 TABLET BY MOUTH DAILY     No current facility-administered medications for this visit.         Fran Gee MD  [facility] and V[facility] [place]  [address] [facility] [address]  [address]

## 2019-11-07 NOTE — PATIENT INSTRUCTIONS
Please exercise at least 30-45 minutes/day   Try to eat almonds instead of cashews  Try to eat 5-7 servings of fruits and vegetables daily   Please have fasting labs drawn in 8 weeks at [facility]   Please bring a 2 week food diary with you to your next appointment  My[employer]itnessPal or [employer] cosmo for your smartphone

## 2019-11-07 NOTE — PROGRESS NOTES
Chief Complaint   Patient presents with    Hypertension     1. Have you been to the ER, urgent care clinic since your last visit? Hospitalized since your last visit? No    2. Have you seen or consulted any other health care providers outside of the [address] [place] [address] since your last visit? Include any pap smears or colon screening. No    3) Do you have an Advance Directive on file?  no

## 2020-01-26 LAB
CHOLEST SERPL-MCNC: 251 MG/DL (ref 100–199)
HDLC SERPL-MCNC: 41 MG/DL
INTERPRETATION, 910389: NORMAL
LDLC SERPL CALC-MCNC: 165 MG/DL (ref 0–99)
TRIGL SERPL-MCNC: 226 MG/DL (ref 0–149)
VLDLC SERPL CALC-MCNC: 45 MG/DL (ref 5–40)

## 2020-01-28 NOTE — PROGRESS NOTES
[facility] [address] [facility]:   [phone]    HPI: Josh Gates, a 37y.o. year-old who presents for follow up regarding his dyslipidemia and HTN. Reviewed lipid results today  He is now exercising 3-4 days/week, does cardio/running for at least 30 minutes and then weights for 30 minutes   Still watching what he eats, eats a chopped salad with chicken breast every day for lunch, hard boiled egg and greek yogurt for breakfast, dinners are usually chicken or pork loin, veggies  Eats pizza on a rare Friday night  BP is well controlled at home  He does not add salt to his food  He has lost 8 lbs since his last visit   No chest pain or dyspnea with exertion  No palpitations  No dizziness or syncope  Commutes 3 days/week   Son born in 10/2017 and is doing well, wife is [facility] home with him. Assessment/Plan:  1. HTN- well controlled on home readings on coreg 12.5mg BID, valsartan /12.5mg daily in the morning, amlodipine 5mg daily in the evening   -had cough on lisinopril  -discussed staying hydrated with 64 oz of water daily  -will plan to check BMP and magnesium level in 6 months  2. Body mass index is 25.23 kg/m². 3. Dyslipidemia - LDL down to 165, TG down to 223  -encouraged him to continue working on diet and exercise  -will recheck lipids in 6 months and follow up with Dr. Trevor Brooks in 6 months   -he would like to avoid medication if possible, discussed considering a coronary calcium scan to look for evidence of early plaque in his 45s  4. Palpitations- none recently, holter if symptoms recur, no syncope  5. Dyspnea - resolved, TTE ok     Echo 7/17 - LVEF 60 % to 65 %, no WMA, wall thickness was at the upper limits of normal, mild-moderate focal thickening of the anterior leaflet on mitral valve    Fhx mother with HTN, gm with CAD  Soc Hx: no tobacco use, drinks 1-2 drinks/week, no drug use,     He  has a past medical history of Essential hypertension and Hyperlipidemia.     Cardiovascular ROS: negative for chest pain or dyspnea on exertion   Respiratory ROS: no cough or wheezing   Neurological ROS: no TIA or stroke symptoms  All other systems negative except as above. PE  Vitals:    01/29/20 1419   BP: 148/86   Pulse: 70   Weight: 186 lb (84.4 kg)   Height: 6' (1.829 m)    Body mass index is 25.23 kg/m².    General appearance - alert, well appearing, and in no distress  Mental status - affect appropriate to mood  Eyes - sclera anicteric, moist mucous membranes  Neck - supple, no carotid bruits   Lymphatics - not assessed   Chest - clear to auscultation, no wheezes, rales or rhonchi  Heart - normal rate, regular rhythm, normal S1, S2, no murmurs, rubs, clicks or gallops  Abdomen - soft, nontender, nondistended  Back exam - full range of motion  Neurological - cranial nerves II through XII grossly intact, no focal deficit  Musculoskeletal - normal strength  Extremities - peripheral pulses normal, no pedal edema  Skin - normal coloration  no rashes    Recent Labs:  Lab Results   Component Value Date/Time    Cholesterol, total 251 (H) 01/25/2020 09:22 AM    HDL Cholesterol 41 01/25/2020 09:22 AM    LDL, calculated 165 (H) 01/25/2020 09:22 AM    Triglyceride 226 (H) 01/25/2020 09:22 AM     Lab Results   Component Value Date/Time    Creatinine 1.39 (H) 04/25/2019 09:04 AM     Lab Results   Component Value Date/Time    BUN 26 (H) 04/25/2019 09:04 AM     Lab Results   Component Value Date/Time    Potassium 4.7 04/25/2019 09:04 AM     No results found for: HBA1C, HGBE8, QTT6GIII, HTE1SNFW  Lab Results   Component Value Date/Time    HGB 15.2 10/14/2015 01:11 AM     Lab Results   Component Value Date/Time    PLATELET 251 56/47/6524 01:11 AM       Reviewed:  Past Medical History:   Diagnosis Date    Essential hypertension     Hyperlipidemia      Social History     Tobacco Use   Smoking Status Never Smoker   Smokeless Tobacco Never Used     Social History     Substance and Sexual Activity   Alcohol Use Yes  Alcohol/week: 0.0 standard drinks    Frequency: 2-3 times a week    Drinks per session: 1 or 2    Binge frequency: Never    Comment: 2-3 times a week     Allergies   Allergen Reactions    Lisinopril Cough    Penicillins Unknown (comments)       Current Outpatient Medications   Medication Sig    carvedilol (COREG) 12.5 mg tablet TAKE 1 TABLET BY MOUTH TWICE DAILY    amLODIPine (NORVASC) 5 mg tablet TAKE 1 TABLET BY MOUTH DAILY    valsartan-hydroCHLOROthiazide (DIOVAN-HCT) 320-25 mg per tablet TAKE 1 TABLET BY MOUTH DAILY     No current facility-administered medications for this visit.         Mat Bejarano, NP  [facility] heart and Vascular [place]  [address], [address], [address] [place] [address] Avenue

## 2020-01-29 ENCOUNTER — OFFICE VISIT (OUTPATIENT)
Dept: CARDIOLOGY CLINIC | Age: 35
End: 2020-01-29

## 2020-01-29 VITALS
HEIGHT: 72 IN | DIASTOLIC BLOOD PRESSURE: 86 MMHG | BODY MASS INDEX: 25.19 KG/M2 | SYSTOLIC BLOOD PRESSURE: 148 MMHG | WEIGHT: 186 LBS | HEART RATE: 70 BPM

## 2020-01-29 DIAGNOSIS — E78.5 DYSLIPIDEMIA: ICD-10-CM

## 2020-01-29 DIAGNOSIS — I10 ESSENTIAL HYPERTENSION: Primary | ICD-10-CM

## 2020-01-29 NOTE — PATIENT INSTRUCTIONS
Please having fasting labs drawn a week or two before your next appointment Please continue to work on diet and exercise Please try to drink at least 64 oz of water daily S:  Chief Complaint   Patient presents with   • Cough       Patient comes in for the following problems:  1. RAD (reactive airway disease), mild persistent, with acute exacerbation    2. Environmental allergies    3. Moderate persistent asthmatic bronchitis with acute exacerbation    4. Need for hepatitis C screening test    5. Visit for screening mammogram    6. Colon cancer screening    7. DDD (degenerative disc disease), lumbar    8. DDD (degenerative disc disease), cervical        Margoth Figueroa is a pleasant 53 year old female who presents today for Reactive airway disease or moderate persistent asthma. She is coughing at this time. She is out of her Dulera as well as Singulair. She needs refills on albuterol. Also multiple screening tests were ordered. Previously she was seen pain management for lumbar and cervical degenerative disc disease. I changed her Dulera to Breo 100, 1 puff daily she also received Zithromax and prednisone for acute exacerbation of her asthma. I did give her a sample of breo to begin in case it's not covered then we will have to find out what is covered for her.    I have reviewed the patient's medications and allergies, past medical, surgical, social and family history, updating these as appropriate.  See Histories section of the EMR for a display of this information.    MEDICATIONS:  Current Outpatient Prescriptions   Medication Sig Dispense Refill   • azithromycin (ZITHROMAX Z-ELVIS) 250 MG tablet Take 1 tablet by mouth daily. 2 tablets first day, then 1 tablet daily for 4 more days 6 tablet 0   • montelukast (SINGULAIR) 10 MG tablet Take 1 tablet by mouth daily. 90 tablet 3   • predniSONE (DELTASONE) 20 MG tablet 2 tablets once daily with food for 5 days 10 tablet 0   • albuterol (PROAIR HFA) 108 (90 Base) MCG/ACT inhaler One or 2 puffs every 4 hours as needed for shortness of breath 8.5 g 11   • FLOVENT  MCG/ACT inhaler INHALE 2 PUFFS INTO THE LUNGS TWICE DAILY 12 g 0   •  ALPRAZolam (XANAX) 0.5 MG tablet Take 0.5 mg by mouth nightly as needed for Sleep.     • Conj Estrogens-Bazedoxifene (DUAVEE) 0.45-20 MG Tab Take by mouth daily.     • promethazine-codeine (PHENERGAN WITH CODEINE) 6.25-10 MG/5ML syrup Take 5 mLs by mouth 4 times daily as needed for Cough. May cause drowsiness 240 mL 0   • gabapentin (NEURONTIN) 300 MG capsule TAKE 1 CAPSULE BY MOUTH THREE TIMES DAILY 270 capsule 3   • fluticasone-vilanterol (BREO ELLIPTA) 100-25 MCG/INH inhaler Inhale 1 puff into the lungs once daily. 60 each 11     No current facility-administered medications for this visit.        ALLERGIES:  ALLERGIES:   Allergen Reactions   • Sulfa Antibiotics HIVES       ROS:  Constitutional:  Denies fever or chills   Eyes:  Denies change in visual acuity   HENT:  Positive nasal congestion or sore throat   Respiratory:  Positive cough or shortness of breath   Cardiovascular:  Denies chest pain or edema   GI:  Denies abdominal pain, nausea, vomiting, bloody stools or diarrhea   :  Denies dysuria   Musculoskeletal:  Denies back pain or joint pain   Integument:  Denies rash   Neurologic:  Denies headache, focal weakness or sensory changes   Endocrine:  Denies polyuria or polydipsia   Lymphatic:  Denies swollen glands   Psychiatric:  Denies depression or anxiety       O:  Visit Vitals   • BP (!) 120/92 ([place] [facility] [place]itting, Cuff Size: Regular)   • Pulse 80   • Ht 5' 7\" (1.702 m)   • Wt 66.2 kg   • BMI 22.87 kg/m2       Constitutional:  Well developed, well nourished, no acute distress, non-toxic appearance   Eyes:  PERRL, conjunctiva normal   HENT:  Atraumatic, external ears normal, nose Congested, oropharynx moist, no pharyngeal exudates. Neck- normal range of motion, no tenderness, supple   Respiratory:  No respiratory distress, normal breath sounds, no rales, no wheezing   Cardiovascular:  Normal rate, normal rhythm, no murmurs, no gallops, no rubs   GI:  Soft, nondistended, normal  bowel sounds, nontender, no organomegaly, no mass, no rebound, no guarding   :  No costovertebral angle tenderness   Musculoskeletal:  No edema, no tenderness, no deformities. Back- no tenderness  Integument:  Well hydrated, no rash   Lymphatic:  No lymphadenopathy noted   Neurologic:  Alert & oriented x 3, CN 2-12 normal, normal motor function, normal sensory function, no focal deficits noted   Psychiatric:  Speech and behavior appropriate         1. RAD (reactive airway disease), mild persistent, with acute exacerbation    2. Environmental allergies    3. Moderate persistent asthmatic bronchitis with acute exacerbation    4. Need for hepatitis C screening test    5. Visit for screening mammogram    6. Colon cancer screening    7. DDD (degenerative disc disease), lumbar    8. DDD (degenerative disc disease), cervical      Orders Placed This Encounter   • Open Access Colonoscopy (27161.01)   • Mammogram - Screening, Bilateral    • Hepatitis C Antibody with Reflex   • CBC & Auto Differential   • Comprehensive Metabolic Panel   • Urinalysis with Micro & Culture if Indicated   • Occult Blood Test Tube   • azithromycin (ZITHROMAX Z-ELVIS) 250 MG tablet   • montelukast (SINGULAIR) 10 MG tablet   • predniSONE (DELTASONE) 20 MG tablet   • albuterol (PROAIR HFA) 108 (90 Base) MCG/ACT inhaler   • fluticasone-vilanterol (BREO ELLIPTA) 100-25 MCG/INH inhaler   • DISCONTD: gabapentin (NEURONTIN) 300 MG capsule        Return in about 4 weeks (around 6/19/2017), or if symptoms worsen or fail to improve.    Instructions provided as documented in the after visit summary.    The patient indicated understanding of the diagnosis and agreed with the plan of care.    Tano Marinelli Jr, MD,

## 2020-03-17 RX ORDER — AMLODIPINE BESYLATE 5 MG/1
TABLET ORAL
Qty: 90 TAB | Refills: 3 | Status: SHIPPED | OUTPATIENT
Start: 2020-03-17 | End: 2021-03-03

## 2020-03-17 RX ORDER — VALSARTAN AND HYDROCHLOROTHIAZIDE 320; 25 MG/1; MG/1
TABLET, FILM COATED ORAL
Qty: 90 TAB | Refills: 3 | Status: SHIPPED | OUTPATIENT
Start: 2020-03-17 | End: 2021-03-03

## 2020-07-28 LAB
ALBUMIN SERPL-MCNC: 4.6 G/DL (ref 4–5)
ALBUMIN/GLOB SERPL: 2 {RATIO} (ref 1.2–2.2)
ALP SERPL-CCNC: 82 IU/L (ref 39–117)
ALT SERPL-CCNC: 24 IU/L (ref 0–44)
AST SERPL-CCNC: 20 IU/L (ref 0–40)
BILIRUB SERPL-MCNC: 0.4 MG/DL (ref 0–1.2)
BUN SERPL-MCNC: 20 MG/DL (ref 6–20)
BUN/CREAT SERPL: 18 (ref 9–20)
CALCIUM SERPL-MCNC: 9.8 MG/DL (ref 8.7–10.2)
CHLORIDE SERPL-SCNC: 98 MMOL/L (ref 96–106)
CHOLEST SERPL-MCNC: 258 MG/DL (ref 100–199)
CO2 SERPL-SCNC: 24 MMOL/L (ref 20–29)
CREAT SERPL-MCNC: 1.13 MG/DL (ref 0.76–1.27)
GLOBULIN SER CALC-MCNC: 2.3 G/DL (ref 1.5–4.5)
GLUCOSE SERPL-MCNC: 100 MG/DL (ref 65–99)
HDLC SERPL-MCNC: 44 MG/DL
INTERPRETATION, 910389: NORMAL
LDLC SERPL CALC-MCNC: ABNORMAL MG/DL (ref 0–99)
MAGNESIUM SERPL-MCNC: 2.2 MG/DL (ref 1.6–2.3)
POTASSIUM SERPL-SCNC: 4.7 MMOL/L (ref 3.5–5.2)
PROT SERPL-MCNC: 6.9 G/DL (ref 6–8.5)
SODIUM SERPL-SCNC: 139 MMOL/L (ref 134–144)
TRIGL SERPL-MCNC: 480 MG/DL (ref 0–149)
VLDLC SERPL CALC-MCNC: ABNORMAL MG/DL (ref 5–40)

## 2020-07-29 ENCOUNTER — OFFICE VISIT (OUTPATIENT)
Dept: CARDIOLOGY CLINIC | Age: 35
End: 2020-07-29

## 2020-07-29 VITALS
HEART RATE: 76 BPM | OXYGEN SATURATION: 98 % | RESPIRATION RATE: 16 BRPM | BODY MASS INDEX: 26.41 KG/M2 | WEIGHT: 195 LBS | DIASTOLIC BLOOD PRESSURE: 80 MMHG | HEIGHT: 72 IN | SYSTOLIC BLOOD PRESSURE: 126 MMHG

## 2020-07-29 DIAGNOSIS — I10 ESSENTIAL HYPERTENSION: Primary | ICD-10-CM

## 2020-07-29 DIAGNOSIS — E78.2 MIXED HYPERLIPIDEMIA: ICD-10-CM

## 2020-07-29 DIAGNOSIS — E11.59 TYPE 2 DIABETES MELLITUS WITH OTHER CIRCULATORY COMPLICATION, WITHOUT LONG-TERM CURRENT USE OF INSULIN (HCC): ICD-10-CM

## 2020-07-29 DIAGNOSIS — R00.2 PALPITATIONS: ICD-10-CM

## 2020-07-29 DIAGNOSIS — R06.09 DOE (DYSPNEA ON EXERTION): ICD-10-CM

## 2020-07-29 DIAGNOSIS — E78.5 HYPERLIPIDEMIA, UNSPECIFIED HYPERLIPIDEMIA TYPE: ICD-10-CM

## 2020-07-29 DIAGNOSIS — I10 HYPERTENSION, UNSPECIFIED TYPE: ICD-10-CM

## 2020-07-29 DIAGNOSIS — G47.33 OSA (OBSTRUCTIVE SLEEP APNEA): ICD-10-CM

## 2020-07-29 DIAGNOSIS — E78.5 DYSLIPIDEMIA: ICD-10-CM

## 2020-07-29 DIAGNOSIS — I10 BENIGN ESSENTIAL HYPERTENSION: ICD-10-CM

## 2020-07-29 DIAGNOSIS — E78.1 HYPERTRIGLYCERIDEMIA: ICD-10-CM

## 2020-07-29 NOTE — PROGRESS NOTES
[facility] [address] [facility]:   [phone]    HPI: Sloane Phillips, a 37y.o. year-old who presents for follow up regarding his dyslipidemia and HTN. Reviewed lipid results today  They look bad. Working from home, gym closed is bad for him. He is working in finance, moderately high stress. Still Walking but 3 days a week. 6 miles a day. Reviewed his lifestyle and labs, that he must get back on track- walking desk standing desk, exercise equipment at home mel[place]. Labs bad- tgup to 480, cont bp meds, recheck in 3 mos. He was: exercising 3-4 days/week, does cardio/running for at least 30 minutes and then weights for 30 minutes    watching what he eats, eats a chopped salad with chicken breast every day for lunch, hard boiled egg and greek yogurt for breakfast, dinners are usually chicken or pork loin, veggies  Eats pizza on a rare Friday night  BP is well controlled at home  He does not add salt to his food    He has gained 7 lbs since his last visit   No chest pain or dyspnea with exertion  No palpitations  No dizziness or syncope  Commutes 3 days/week   Son born in 10/2017 and is doing well, wife is home with him. Expecting again! Assessment/Plan:  1. HTN- well controlled on home readings on coreg 12.5mg BID, valsartan /12.5mg daily in the morning, amlodipine 5mg daily in the evening   -had cough on lisinopril  -discussed staying hydrated with 64 oz of water daily  2. Body mass index is 26.45 kg/m². 3. Dyslipidemia - LDL down to 165, TG down to 223--> now way back up  and   -encouraged him to continue working on diet and exercise  -he would like to avoid medication if possible, discussed considering a coronary calcium scan to look for evidence of early plaque in his 40s  4. Palpitations- none recently, holter if symptoms recur, no syncope  5.  Dyspnea - resolved, TTE ok     Echo 7/17 - LVEF 60 % to 65 %, no WMA, wall thickness was at the upper limits of normal, mild-moderate focal thickening of the anterior leaflet on mitral valve    Fhx mother with HTN, gm with CAD  Soc Hx: no tobacco use, drinks 1-2 drinks/week, no drug use,     He  has a past medical history of Essential hypertension and Hyperlipidemia. Cardiovascular ROS: negative for chest pain or dyspnea on exertion   Respiratory ROS: no cough or wheezing   Neurological ROS: no TIA or stroke symptoms  All other systems negative except as above. PE  Vitals:    07/29/20 1018   BP: 126/80   Pulse: 76   Resp: 16   SpO2: 98%   Weight: 195 lb (88.5 kg)   Height: 6' (1.829 m)    Body mass index is 26.45 kg/m².    General appearance - alert, well appearing, and in no distress  Mental status - affect appropriate to mood  Eyes - sclera anicteric, moist mucous membranes  Neck - supple, no carotid bruits   Lymphatics - not assessed   Chest - clear to auscultation, no wheezes, rales or rhonchi  Heart - normal rate, regular rhythm, normal S1, S2, no murmurs, rubs, clicks or gallops  Abdomen - soft, nontender, nondistended  Back exam - full range of motion  Neurological - cranial nerves II through XII grossly intact, no focal deficit  Musculoskeletal - normal strength  Extremities - peripheral pulses normal, no pedal edema  Skin - normal coloration  no rashes    Recent Labs:  Lab Results   Component Value Date/Time    Cholesterol, total 258 (H) 07/27/2020 07:27 AM    HDL Cholesterol 44 07/27/2020 07:27 AM    LDL, calculated Comment 07/27/2020 07:27 AM    Triglyceride 480 (H) 07/27/2020 07:27 AM     Lab Results   Component Value Date/Time    Creatinine 1.13 07/27/2020 07:27 AM     Lab Results   Component Value Date/Time    BUN 20 07/27/2020 07:27 AM     Lab Results   Component Value Date/Time    Potassium 4.7 07/27/2020 07:27 AM     No results found for: HBA1C, HGBE8, HHS9AWZE, YEU0SLIK  Lab Results   Component Value Date/Time    HGB 15.2 10/14/2015 01:11 AM     Lab Results   Component Value Date/Time    PLATELET 290 34/04/4465 01:11 AM       Reviewed:  Past Medical History:   Diagnosis Date    Essential hypertension     Hyperlipidemia      Social History     Tobacco Use   Smoking Status Never Smoker   Smokeless Tobacco Never Used     Social History     Substance and Sexual Activity   Alcohol Use Yes    Alcohol/week: 0.0 standard drinks    Frequency: 2-3 times a week    Drinks per session: 1 or 2    Binge frequency: Never    Comment: 2-3 times a week     Allergies   Allergen Reactions    Lisinopril Cough    Penicillins Unknown (comments)       Current Outpatient Medications   Medication Sig    amLODIPine (NORVASC) 5 mg tablet TAKE 1 TABLET BY MOUTH DAILY    valsartan-hydroCHLOROthiazide (DIOVAN-HCT) 320-25 mg per tablet TAKE 1 TABLET BY MOUTH DAILY    carvedilol (COREG) 12.5 mg tablet TAKE 1 TABLET BY MOUTH TWICE DAILY     No current facility-administered medications for this visit.         Jr Becerra MD  [facility] heart and Vascular [place]  [address], [address] [facility] [address]  [address]

## 2020-10-23 RX ORDER — CARVEDILOL 12.5 MG/1
TABLET ORAL
Qty: 180 TAB | Refills: 3 | Status: SHIPPED | OUTPATIENT
Start: 2020-10-23 | End: 2021-10-22

## 2021-01-07 ENCOUNTER — TELEPHONE (OUTPATIENT)
Dept: CARDIOLOGY CLINIC | Age: 36
End: 2021-01-07

## 2021-01-07 NOTE — TELEPHONE ENCOUNTER
1/7/2021 at 1:56 left message call to reschedule 1/29/2021 9:20 virtual with Dr. Royce Cole she'll be out of the office

## 2021-01-11 NOTE — TELEPHONE ENCOUNTER
1/11/2021 at 10:15 spoke with patient virtual appointment with Dr. Harry May rescheduled as follows:     Future Appointments   Date Time Provider Nati Kennedy   1/25/2021  1:40 PM MD MARTINE Razo AMB

## 2021-01-25 ENCOUNTER — TELEPHONE (OUTPATIENT)
Dept: CARDIOLOGY CLINIC | Age: 36
End: 2021-01-25

## 2021-03-03 RX ORDER — VALSARTAN AND HYDROCHLOROTHIAZIDE 320; 25 MG/1; MG/1
TABLET, FILM COATED ORAL
Qty: 90 TAB | Refills: 0 | Status: SHIPPED | OUTPATIENT
Start: 2021-03-03 | End: 2021-05-28

## 2021-03-03 RX ORDER — AMLODIPINE BESYLATE 5 MG/1
TABLET ORAL
Qty: 90 TAB | Refills: 0 | Status: SHIPPED | OUTPATIENT
Start: 2021-03-03 | End: 2021-05-28

## 2021-03-03 NOTE — TELEPHONE ENCOUNTER
Requested Prescriptions     Signed Prescriptions Disp Refills    amLODIPine (NORVASC) 5 mg tablet 90 Tab 0     Sig: TAKE 1 TABLET BY MOUTH DAILY     Authorizing Provider: Sandro Sheriff     Ordering User: Alexsander Javed valsartan-hydroCHLOROthiazide (DIOVAN-HCT) 320-25 mg per tablet 90 Tab 0     Sig: TAKE 1 TABLET BY MOUTH DAILY     Authorizing Provider: Sandro Sheriff     Ordering User: Portia Redd     Per verbal orders

## 2021-05-28 RX ORDER — AMLODIPINE BESYLATE 5 MG/1
TABLET ORAL
Qty: 90 TABLET | Refills: 0 | Status: SHIPPED | OUTPATIENT
Start: 2021-05-28 | End: 2021-08-26

## 2021-05-28 RX ORDER — VALSARTAN AND HYDROCHLOROTHIAZIDE 320; 25 MG/1; MG/1
TABLET, FILM COATED ORAL
Qty: 90 TABLET | Refills: 0 | Status: SHIPPED | OUTPATIENT
Start: 2021-05-28 | End: 2021-08-26

## 2021-10-18 ENCOUNTER — TELEPHONE (OUTPATIENT)
Dept: CARDIOLOGY CLINIC | Age: 36
End: 2021-10-18

## 2021-10-18 NOTE — TELEPHONE ENCOUNTER
10/18/2021 at 10:35 left message call to reschedule 10/27/2021 appointment with Stormy Garcia., NP she'll be available for PM clinic only on 10/27/2021

## 2021-11-02 ENCOUNTER — TELEPHONE (OUTPATIENT)
Dept: CARDIOLOGY CLINIC | Age: 36
End: 2021-11-02

## 2021-11-02 NOTE — PROGRESS NOTES
[facility] [place]:   [id number]    HPI: Eliazar Schmidt, a 43y.o. year-old who presents for follow up regarding his dyslipidemia and HTN. BP has been well controlled, SBP around 120mmHg  He is feeling pretty good but reports that he has not been exercising and we discussed getting back into that  His stress level remains moderate, works and has a 3 yo and 2yo at home   Discussed his last lipids and he would like to go ahead and have them checked now for a baseline before getting back into exercising so we will do that today   Eats a chopped salad with chicken breast every day for lunch, has a hard boiled egg and greek yogurt for breakfast, dinners are usually chicken or pork loin and veggies  Eats pizza on a rare Friday night  No fast food  He does not add salt to his food  He has gained 7 lbs since his last visit   No chest pain or dyspnea with exertion  Notices some very brief palpitations at bedtime which are not associated with any high risk features  No dizziness or syncope  Does not see PCP regularly, discussed establish care with PCP for regular follow ups by the time he hits age 34    Assessment/Plan:  1. HTN- well controlled on home readings but with weight gain we discussed cutting back on coreg dose to see if that is contributing to his weight gain  -advised him to reduce his coreg to 6.25mg BID and increase amlodipine to 10mg in the evening  -he will continue valsartan /25 one tab daily in the morning  -had cough on lisinopril  -will check CMP and Mg level   -asked him to call or send me a [employer] message in 2 months with an update on his BP after making the above changes  -he will follow up with me in 1 year   2. Body mass index is 27.4 kg/m².   3. Dyslipidemia - TG up to 480 in 7/20  -encouraged him to continue working on diet and resume exercise  -he would like to avoid medication if possible, previously discussed considering a coronary calcium scan to look for evidence of early plaque in his 45s  -briefly discussed Rx fish oil if his TG remain elevated on next check  -will check fasting lipids  4. Palpitations- brief, no high risk features   5. Vitamin D deficiency - will check Vitamin D level      Echo 7/17 - LVEF 60 % to 65 %, no WMA, wall thickness was at the upper limits of normal, mild-moderate focal thickening of the anterior leaflet on mitral valve    Fhx mother with HTN, gm with CAD  Soc Hx: no tobacco use, drinks 1-2 drinks/week, no drug use,     He  has a past medical history of Essential hypertension and Hyperlipidemia. Cardiovascular ROS: negative for chest pain or dyspnea on exertion   Respiratory ROS: no cough or wheezing   Neurological ROS: no TIA or stroke symptoms  All other systems negative except as above. PE  Vitals:    11/03/21 0909   BP: 130/80   Pulse: 86   Resp: 16   SpO2: 99%   Weight: 202 lb (91.6 kg)   Height: 6' (1.829 m)    Body mass index is 27.4 kg/m².    General appearance - alert, well appearing, and in no distress  Mental status - affect appropriate to mood  Eyes - sclera anicteric, moist mucous membranes  Neck - supple, no carotid bruits   Lymphatics - not assessed   Chest - clear to auscultation, no wheezes, rales or rhonchi  Heart - normal rate, regular rhythm, normal S1, S2, no murmurs, rubs, clicks or gallops  Abdomen - soft, nontender, nondistended  Back exam - full range of motion  Neurological - cranial nerves II through XII grossly intact, no focal deficit  Musculoskeletal - normal strength  Extremities - peripheral pulses normal, no pedal edema  Skin - normal coloration  no rashes    12 lead ECG: NSR    Recent Labs:  Lab Results   Component Value Date/Time    Cholesterol, total 258 (H) 07/27/2020 07:27 AM    HDL Cholesterol 44 07/27/2020 07:27 AM    LDL, calculated Comment 07/27/2020 07:27 AM    Triglyceride 480 (H) 07/27/2020 07:27 AM     Lab Results   Component Value Date/Time    Creatinine 1.13 07/27/2020 07:27 AM     Lab Results   Component Value Date/Time    BUN 20 07/27/2020 07:27 AM     Lab Results   Component Value Date/Time    Potassium 4.7 07/27/2020 07:27 AM     No results found for: HBA1C, CWS0XZPH, GPI5VARP  Lab Results   Component Value Date/Time    HGB 15.2 10/14/2015 01:11 AM     Lab Results   Component Value Date/Time    PLATELET 254 90/09/2427 01:11 AM       Reviewed:  Past Medical History:   Diagnosis Date    Essential hypertension     Hyperlipidemia      Social History     Tobacco Use   Smoking Status Never Smoker   Smokeless Tobacco Never Used     Social History     Substance and Sexual Activity   Alcohol Use Yes    Alcohol/week: 0.0 standard drinks    Comment: 2-3 times a week     Allergies   Allergen Reactions    Lisinopril Cough    Penicillins Unknown (comments)       Current Outpatient Medications   Medication Sig    carvediloL (COREG) 6.25 mg tablet Take 1 Tablet by mouth two (2) times a day.  amLODIPine (NORVASC) 10 mg tablet Take 1 Tablet by mouth daily.  valsartan-hydroCHLOROthiazide (DIOVAN-HCT) 320-25 mg per tablet Take 1 Tablet by mouth daily. No current facility-administered medications for this visit.        Jackson Yeager, EDUARD  [address] [facility] [place]  [address]  [place], [facility] [address]

## 2021-11-03 ENCOUNTER — OFFICE VISIT (OUTPATIENT)
Dept: CARDIOLOGY CLINIC | Age: 36
End: 2021-11-03
Payer: COMMERCIAL

## 2021-11-03 VITALS
BODY MASS INDEX: 27.36 KG/M2 | OXYGEN SATURATION: 99 % | HEART RATE: 86 BPM | RESPIRATION RATE: 16 BRPM | HEIGHT: 72 IN | DIASTOLIC BLOOD PRESSURE: 80 MMHG | WEIGHT: 202 LBS | SYSTOLIC BLOOD PRESSURE: 130 MMHG

## 2021-11-03 DIAGNOSIS — E78.5 DYSLIPIDEMIA: ICD-10-CM

## 2021-11-03 DIAGNOSIS — E55.9 VITAMIN D DEFICIENCY: ICD-10-CM

## 2021-11-03 DIAGNOSIS — R00.2 PALPITATIONS: ICD-10-CM

## 2021-11-03 DIAGNOSIS — I10 ESSENTIAL HYPERTENSION: Primary | ICD-10-CM

## 2021-11-03 DIAGNOSIS — R63.5 WEIGHT GAIN: ICD-10-CM

## 2021-11-03 PROCEDURE — 93000 ELECTROCARDIOGRAM COMPLETE: CPT | Performed by: NURSE PRACTITIONER

## 2021-11-03 PROCEDURE — 99214 OFFICE O/P EST MOD 30 MIN: CPT | Performed by: NURSE PRACTITIONER

## 2021-11-03 RX ORDER — CARVEDILOL 6.25 MG/1
6.25 TABLET ORAL 2 TIMES DAILY
Qty: 180 TABLET | Refills: 0 | Status: SHIPPED | OUTPATIENT
Start: 2021-11-03 | End: 2022-04-21 | Stop reason: DRUGHIGH

## 2021-11-03 RX ORDER — VALSARTAN AND HYDROCHLOROTHIAZIDE 320; 25 MG/1; MG/1
1 TABLET, FILM COATED ORAL DAILY
Qty: 90 TABLET | Refills: 3 | Status: SHIPPED | OUTPATIENT
Start: 2021-11-03

## 2021-11-03 RX ORDER — AMLODIPINE BESYLATE 10 MG/1
10 TABLET ORAL DAILY
Qty: 90 TABLET | Refills: 3 | Status: SHIPPED | OUTPATIENT
Start: 2021-11-03 | End: 2021-12-15 | Stop reason: DRUGHIGH

## 2021-11-03 NOTE — PATIENT INSTRUCTIONS
Please reduce your carvedilol to 6.25mg twice daily (1/2 of 12.5mg tab)  Please increase your amlodipine to 10mg nightly (two 5mg tabs)  Continue the same dose of valsartan HCT    Please have fasting labs drawn sometimes in the next 2 weeks     Send me an update on your blood pressures in 2 months     Please get back to exercising 4-5 days/week for at least 30 minutes

## 2021-11-05 LAB
25(OH)D3+25(OH)D2 SERPL-MCNC: 20.5 NG/ML (ref 30–100)
ALBUMIN SERPL-MCNC: 4.4 G/DL (ref 4–5)
ALBUMIN/GLOB SERPL: 1.7 {RATIO} (ref 1.2–2.2)
ALP SERPL-CCNC: 105 IU/L (ref 44–121)
ALT SERPL-CCNC: 29 IU/L (ref 0–44)
AST SERPL-CCNC: 19 IU/L (ref 0–40)
BILIRUB SERPL-MCNC: 0.3 MG/DL (ref 0–1.2)
BUN SERPL-MCNC: 18 MG/DL (ref 6–20)
BUN/CREAT SERPL: 17 (ref 9–20)
CALCIUM SERPL-MCNC: 9.3 MG/DL (ref 8.7–10.2)
CHLORIDE SERPL-SCNC: 98 MMOL/L (ref 96–106)
CHOLEST SERPL-MCNC: 257 MG/DL (ref 100–199)
CO2 SERPL-SCNC: 26 MMOL/L (ref 20–29)
CREAT SERPL-MCNC: 1.03 MG/DL (ref 0.76–1.27)
GLOBULIN SER CALC-MCNC: 2.6 G/DL (ref 1.5–4.5)
GLUCOSE SERPL-MCNC: 103 MG/DL (ref 65–99)
HDLC SERPL-MCNC: 36 MG/DL
IMP & REVIEW OF LAB RESULTS: NORMAL
LDLC SERPL CALC-MCNC: 147 MG/DL (ref 0–99)
MAGNESIUM SERPL-MCNC: 2.2 MG/DL (ref 1.6–2.3)
POTASSIUM SERPL-SCNC: 4.2 MMOL/L (ref 3.5–5.2)
PROT SERPL-MCNC: 7 G/DL (ref 6–8.5)
SODIUM SERPL-SCNC: 139 MMOL/L (ref 134–144)
TRIGL SERPL-MCNC: 396 MG/DL (ref 0–149)
VLDLC SERPL CALC-MCNC: 74 MG/DL (ref 5–40)

## 2021-12-15 ENCOUNTER — TELEPHONE (OUTPATIENT)
Dept: CARDIOLOGY CLINIC | Age: 36
End: 2021-12-15

## 2021-12-15 RX ORDER — AMLODIPINE BESYLATE 5 MG/1
5 TABLET ORAL DAILY
COMMUNITY
End: 2021-12-15 | Stop reason: SDUPTHER

## 2021-12-15 RX ORDER — AMLODIPINE BESYLATE 5 MG/1
5 TABLET ORAL DAILY
Qty: 90 TABLET | Refills: 0 | Status: SHIPPED | OUTPATIENT
Start: 2021-12-15 | End: 2022-03-04 | Stop reason: SDUPTHER

## 2021-12-15 NOTE — TELEPHONE ENCOUNTER
Verified patient with two patient identifiers. Spoke with patient regarding his medications. Per patient he does not feel well with the med regimen. He went back on taking Amlodipine 5 mg and refill was sent. In absence of Amberly DREWper :          ----- Message from Vick Patel MD sent at 12/15/2021 10:48 AM EST -----  Regarding: FW: Medication Change Update  Please adjust scripts to what he is currently taking. thx        Patient verbalized understanding.

## 2021-12-15 NOTE — TELEPHONE ENCOUNTER
----- Message from Joanne Alfredo MD sent at 12/15/2021 10:48 AM EST -----  Regarding: FW: Medication Change Update  Please adjust scripts to what he is currently taking. thx  ----- Message -----  From: Yenny Odom LPN  Sent: 23/27/9725   8:49 AM EST  To: Joanne Alfredo MD  Subject: FW: Medication Change Update                     Please advise. Marisabel Patrickkristan off today till the rest of the week. [facility] Thank you  ----- Message -----  From: Lorene Adhikari RN  Sent: 12/15/2021   8:19 AM EST  To: Pushpa Hernández LPN  Subject: FW: Medication Change Update                       ----- Message -----  From: Benjie Lawton  Sent: 12/14/2021   8:01 PM EST  To: Exie Rebel Nurse Pool  Subject: Medication Change Update                         Chente Ko,    Happy Holidays! At our last appointment, we discussed adjusting the amount of Coreg and Amlodipine I was taking. After our appointment, I made this adjustment for about 10 days and my blood pressure was ok, however, I just didn't feel as well as I did on the previous regiment. I felt like my energy was lower. Since then, I have been taking the previous regiment of 12.5mg Coreg twice a day (morning and night) and 5mg Amlodipine once a day(night). No changes to the Valsartan/HCTZ. Blood pressure has been good. I wanted to update you on this and also see if you could adjust the prescription amount for those two medications back to the previous regiment so the dosage is correct. I believe the Amlodipine is scheduled to refill soon. The Coreg was refilled right before our appointment. Please let me know if you have any questions.   Thank you, Lázaro Benitez

## 2022-01-24 RX ORDER — CARVEDILOL 12.5 MG/1
TABLET ORAL
Qty: 180 TABLET | Refills: 0 | Status: SHIPPED | OUTPATIENT
Start: 2022-01-24 | End: 2022-04-21 | Stop reason: SDUPTHER

## 2022-03-04 RX ORDER — AMLODIPINE BESYLATE 5 MG/1
5 TABLET ORAL DAILY
Qty: 90 TABLET | Refills: 0 | Status: SHIPPED | OUTPATIENT
Start: 2022-03-04

## 2022-03-04 RX ORDER — AMLODIPINE BESYLATE 5 MG/1
5 TABLET ORAL DAILY
Qty: 90 TABLET | Refills: 0 | Status: SHIPPED | OUTPATIENT
Start: 2022-03-04 | End: 2022-05-25

## 2022-04-21 RX ORDER — CARVEDILOL 12.5 MG/1
12.5 TABLET ORAL 2 TIMES DAILY
Qty: 180 TABLET | Refills: 2 | Status: SHIPPED | OUTPATIENT
Start: 2022-04-21

## 2022-05-25 DIAGNOSIS — I10 ESSENTIAL HYPERTENSION: Primary | ICD-10-CM

## 2022-05-25 RX ORDER — AMLODIPINE BESYLATE 5 MG/1
5 TABLET ORAL DAILY
Qty: 90 TABLET | Refills: 0 | Status: SHIPPED | OUTPATIENT
Start: 2022-05-25 | End: 2022-08-09

## 2022-05-25 NOTE — TELEPHONE ENCOUNTER
Refilled per VO per NP    Future Appointments   Date Time Provider Nati Kennedy   11/1/2022  9:40 AM Aislinn Miranda, EDUARD FOY AMB

## 2022-07-11 ENCOUNTER — TELEPHONE (OUTPATIENT)
Dept: CARDIOLOGY CLINIC | Age: 37
End: 2022-07-11

## 2022-07-11 NOTE — TELEPHONE ENCOUNTER
TABBYM requesting a return call to reschedule 11/1/22 appointment with Janette Lopez NP; Provider covering [facility].

## 2022-08-08 DIAGNOSIS — I10 ESSENTIAL HYPERTENSION: ICD-10-CM

## 2022-08-09 RX ORDER — AMLODIPINE BESYLATE 5 MG/1
5 TABLET ORAL DAILY
Qty: 90 TABLET | Refills: 0 | Status: SHIPPED | OUTPATIENT
Start: 2022-08-09

## 2022-11-05 DIAGNOSIS — I10 ESSENTIAL HYPERTENSION: ICD-10-CM

## 2022-11-08 RX ORDER — AMLODIPINE BESYLATE 5 MG/1
5 TABLET ORAL DAILY
Qty: 30 TABLET | Refills: 0 | Status: SHIPPED | OUTPATIENT
Start: 2022-11-08

## 2022-11-08 NOTE — TELEPHONE ENCOUNTER
Refilled for 30 days per VO per Md  Must make appointment for further refills  No future appointments.

## 2022-12-05 DIAGNOSIS — I10 ESSENTIAL HYPERTENSION: ICD-10-CM

## 2022-12-06 RX ORDER — AMLODIPINE BESYLATE 5 MG/1
5 TABLET ORAL DAILY
Qty: 90 TABLET | OUTPATIENT
Start: 2022-12-06

## 2022-12-08 ENCOUNTER — TELEPHONE (OUTPATIENT)
Dept: CARDIOLOGY CLINIC | Age: 37
End: 2022-12-08

## 2022-12-08 DIAGNOSIS — E55.9 VITAMIN D DEFICIENCY: ICD-10-CM

## 2022-12-08 DIAGNOSIS — R00.2 PALPITATIONS: ICD-10-CM

## 2022-12-08 DIAGNOSIS — E78.5 DYSLIPIDEMIA: Primary | ICD-10-CM

## 2022-12-08 NOTE — TELEPHONE ENCOUNTER
Patient has an appt on Tuesday with Sabino Carter. Wants to know if she should have bloodwork prior to the appointment. States that he can come by the office to  the slip. Requests a call back at [phone].     Thanks,  Arvind Maldonado

## 2022-12-08 NOTE — TELEPHONE ENCOUNTER
He needs a CMP, CBC , fasting lipids and vitamin D level    Future Appointments   Date Time Provider Nati Kennedy   12/13/2022  9:40 AM Klaus Miranda, EDUARD FOY AMB

## 2022-12-13 ENCOUNTER — OFFICE VISIT (OUTPATIENT)
Dept: CARDIOLOGY CLINIC | Age: 37
End: 2022-12-13
Payer: COMMERCIAL

## 2022-12-13 ENCOUNTER — TELEPHONE (OUTPATIENT)
Dept: CARDIOLOGY CLINIC | Age: 37
End: 2022-12-13

## 2022-12-13 VITALS
DIASTOLIC BLOOD PRESSURE: 80 MMHG | OXYGEN SATURATION: 99 % | HEART RATE: 83 BPM | WEIGHT: 201 LBS | SYSTOLIC BLOOD PRESSURE: 130 MMHG | HEIGHT: 72 IN | BODY MASS INDEX: 27.22 KG/M2 | RESPIRATION RATE: 18 BRPM

## 2022-12-13 DIAGNOSIS — E55.9 VITAMIN D DEFICIENCY: ICD-10-CM

## 2022-12-13 DIAGNOSIS — E78.5 DYSLIPIDEMIA: ICD-10-CM

## 2022-12-13 DIAGNOSIS — I10 ESSENTIAL HYPERTENSION: ICD-10-CM

## 2022-12-13 DIAGNOSIS — R06.83 SNORING: ICD-10-CM

## 2022-12-13 DIAGNOSIS — R00.2 PALPITATIONS: Primary | ICD-10-CM

## 2022-12-13 PROCEDURE — 93000 ELECTROCARDIOGRAM COMPLETE: CPT | Performed by: NURSE PRACTITIONER

## 2022-12-13 PROCEDURE — 99214 OFFICE O/P EST MOD 30 MIN: CPT | Performed by: NURSE PRACTITIONER

## 2022-12-13 PROCEDURE — 3074F SYST BP LT 130 MM HG: CPT | Performed by: NURSE PRACTITIONER

## 2022-12-13 PROCEDURE — 3078F DIAST BP <80 MM HG: CPT | Performed by: NURSE PRACTITIONER

## 2022-12-13 RX ORDER — AMLODIPINE BESYLATE 5 MG/1
5 TABLET ORAL DAILY
Qty: 90 TABLET | Refills: 3 | Status: SHIPPED | OUTPATIENT
Start: 2022-12-13

## 2022-12-13 RX ORDER — CARVEDILOL 12.5 MG/1
12.5 TABLET ORAL 2 TIMES DAILY
Qty: 180 TABLET | Refills: 3 | Status: SHIPPED | OUTPATIENT
Start: 2022-12-13

## 2022-12-13 RX ORDER — GLUCOSAMINE SULFATE 1500 MG
1000 POWDER IN PACKET (EA) ORAL DAILY
COMMUNITY

## 2022-12-13 NOTE — PROGRESS NOTES
From: Shantelle Garcia RN   Sent: 12/13/2022  10:29 AM EST   To: Benton Mckeon, NP would like to have a 2 week loop monitor mailed to patient. dx palpitations. Thanks!    George Paget

## 2022-12-13 NOTE — TELEPHONE ENCOUNTER
Enrolled with [employer] - Ordered and being shipped to patient's home address on file. ETA within 5-7 business days. Message  Received: Today  Lupe Gutierrez, JUDIT Bishop; Devere Ganser., NP would like to have a 2 week loop monitor mailed to patient. dx palpitations. Thanks!    Obie Miller

## 2022-12-13 NOTE — PATIENT INSTRUCTIONS
Please consider having a coronary calcium scan (heart scan) done to look for evidence of early plaque in the arteries of your heart. You should call [phone] to schedule this. This test is not covered by insurance and will cost you approximately $129 this month. Please let the imaging center know that you are a patient here so they can send us the results.     Please make an appointment with sleep medicine to be evaluated today    Restart vitamin D 2000 units daily     I'll see you in the office after your testing is complete

## 2022-12-13 NOTE — PROGRESS NOTES
[facility] [place]:   [id number]    HPI: Charley Lindsey, a 42y.o. year-old who presents for follow up regarding his dyslipidemia and HTN. BP has been well controlled, felt worse on lower dose of coreg so back on coreg 12.5mg BID  Discussed lipid results, TG down with diet and exercise but LDL is up   Discussed proceeding with coronary calcium scan at this point to look for early plaque to help guide therapy  He is feeling pretty good but reports that getting time to exercise is hard  His stress level remains moderate, works and has a 12 yo and 3 yo at home   No chest pain or dyspnea with exertion  Has been having more palpitations recently which are concerning to him, no high risk features but occur intermittently, has noticed them more when he hasn't slept well or drank alcohol  He does snore but denies morning headaches, has some \"lethargy\" during the day   No dizziness or lightheadedness  No LE edema     Assessment/Plan:    1. HTN- well controlled, continue coreg 12.5mg BID, amlodipine 5mg in the evening and valsartan HCT, briefly discussed stopping HCTZ to see if palpitations improved but will proceed with loop monitor as below first   -had cough on lisinopril   2. Body mass index is 27.26 kg/m². Working on diet and exercise   3. Dyslipidemia - TG up to 480 in 7/20, now  but LDL up to 161  -encouraged him to continue working on diet and exercise  -he would like to avoid medication if possible, recommended he proceed with coronary calcium scan to look for evidence of early plaque to help guide therapy for his dyslipidemia   4. Palpitations- will order 2 week loop monitor to assess for arrhythmias, he will follow up with me in 2 months    5. Vitamin D deficiency - level 28.6, advised him to increase his vitamin D to 2000 units daily   6. Mitral valve thickening - will discuss repeating TTE at annual visit to reassess   7.   Snoring - with palpitations and HTN will refer him to sleep medicine for MILENA evaluation      Echo 7/17 - LVEF 60 % to 65 %, no WMA, wall thickness was at the upper limits of normal, mild-moderate focal thickening of the anterior leaflet on mitral valve    Fhx mother with HTN, gm with CAD  Soc Hx: no tobacco use, drinks 1-2 drinks/week, no drug use,     He  has a past medical history of Essential hypertension and Hyperlipidemia. Cardiovascular ROS: negative for chest pain or dyspnea on exertion   Respiratory ROS: no cough or wheezing   Neurological ROS: no TIA or stroke symptoms  All other systems negative except as above. PE  Vitals:    12/13/22 0941   BP: 130/80   Pulse: 83   Resp: 18   SpO2: 99%   Weight: 201 lb (91.2 kg)   Height: 6' (1.829 m)      Body mass index is 27.26 kg/m².    General appearance - alert, well appearing, and in no distress  Mental status - affect appropriate to mood  Eyes - sclera anicteric, moist mucous membranes  Neck - supple, no carotid bruits   Lymphatics - not assessed   Chest - clear to auscultation, no wheezes, rales or rhonchi  Heart - normal rate, regular rhythm, normal S1, S2, no murmurs, rubs, clicks or gallops  Abdomen - soft, nontender, nondistended  Back exam - full range of motion  Neurological - cranial nerves II through XII grossly intact, no focal deficit  Musculoskeletal - normal strength  Extremities - peripheral pulses normal, no pedal edema  Skin - normal coloration  no rashes    12 lead ECG: NSR    Recent Labs:  Lab Results   Component Value Date/Time    Cholesterol, total 253 (H) 12/09/2022 09:38 AM    HDL Cholesterol 44 12/09/2022 09:38 AM    LDL, calculated 161 (H) 12/09/2022 09:38 AM    Triglyceride 240 (H) 12/09/2022 09:38 AM    CHOL/HDL Ratio 5.8 (H) 12/09/2022 09:38 AM     Lab Results   Component Value Date/Time    Creatinine 1.21 12/09/2022 09:38 AM     Lab Results   Component Value Date/Time    BUN 22 (H) 12/09/2022 09:38 AM     Lab Results   Component Value Date/Time    Potassium 4.0 12/09/2022 09:38 AM     No results found for: HBA1C, MGI1EMYX, OVS7YEBQ  Lab Results   Component Value Date/Time    HGB 14.3 12/09/2022 09:38 AM     Lab Results   Component Value Date/Time    PLATELET 812 20/86/0335 09:38 AM       Reviewed:  Past Medical History:   Diagnosis Date    Essential hypertension     Hyperlipidemia      Social History     Tobacco Use   Smoking Status Never   Smokeless Tobacco Never     Social History     Substance and Sexual Activity   Alcohol Use Yes    Alcohol/week: 0.0 standard drinks    Comment: 2-3 times a week     Allergies   Allergen Reactions    Lisinopril Cough    Penicillins Unknown (comments)       Current Outpatient Medications   Medication Sig    cholecalciferol (Vitamin D3) 25 mcg (1,000 unit) cap Take 1,000 Units by mouth daily. valsartan-hydroCHLOROthiazide (DIOVAN-HCT) 320-25 mg per tablet TAKE 1 TABLET BY MOUTH DAILY    carvediloL (COREG) 12.5 mg tablet TAKE 1 TABLET BY MOUTH TWICE DAILY    amLODIPine (NORVASC) 5 mg tablet Take 1 Tablet by mouth daily. No current facility-administered medications for this visit.        Erasmo Merida, NP  [address] [facility] heart and [facility] [place]  [address]

## 2022-12-15 ENCOUNTER — TELEPHONE (OUTPATIENT)
Dept: SLEEP MEDICINE | Age: 37
End: 2022-12-15

## 2022-12-30 ENCOUNTER — PATIENT MESSAGE (OUTPATIENT)
Dept: SLEEP MEDICINE | Age: 37
End: 2022-12-30

## 2023-01-20 ENCOUNTER — OFFICE VISIT (OUTPATIENT)
Dept: SLEEP MEDICINE | Age: 38
End: 2023-01-20
Payer: COMMERCIAL

## 2023-01-20 VITALS
DIASTOLIC BLOOD PRESSURE: 77 MMHG | TEMPERATURE: 98.2 F | HEART RATE: 75 BPM | BODY MASS INDEX: 27.16 KG/M2 | SYSTOLIC BLOOD PRESSURE: 117 MMHG | HEIGHT: 72 IN | WEIGHT: 200.5 LBS | OXYGEN SATURATION: 100 %

## 2023-01-20 DIAGNOSIS — I10 PRIMARY HYPERTENSION: ICD-10-CM

## 2023-01-20 DIAGNOSIS — G47.33 OSA (OBSTRUCTIVE SLEEP APNEA): Primary | ICD-10-CM

## 2023-01-20 NOTE — PROGRESS NOTES
[address] [facility] [place], [address]  Tel.  [phone]  Fax. [address]  [place], [address] [facility]  Tel.  [phone]  Fax. [phone] [address] [facility] [address]  Tel.  [phone]  Fax. [phone]       Yao Murphy is a 42y.o. year old male referred by Johnnie Coreas NP   for evaluation of a sleep disorder. ASSESSMENT/PLAN:      ICD-10-CM ICD-9-CM    1. MILENA (obstructive sleep apnea)  G47.33 327.23 SLEEP STUDY UNATTENDED, 4 CHANNEL      2. Primary hypertension  I10 401.9       3. BMI 27.0-27.9,adult  Z68.27 V85.23           Patient has a history and examination consistent with the diagnosis of sleep apnea. Follow-up and Dispositions    Return for telephone follow-up after testing is completed. * The patient currently has a Low Risk for having sleep apnea. STOP-BANG score 3.    * Sleep testing was ordered for initial evaluation. Orders Placed This Encounter    SLEEP STUDY UNATTENDED, 4 CHANNEL     Order Specific Question:   Reason for Exam     Answer:   MILENA       * He was provided information on sleep apnea including corresponding risk factors and the importance of proper treatment. * Treatment options were reviewed in detail, he would like to proceed with OAT Kendy AGUILAR Camejo) / PAP therapy. * The patient was counseled regarding proper sleep hygiene, with emphasis on ensuring sufficient total sleep time; safe driving and the benefits of exercise and weight loss. * All of his questions were addressed. 2. Hypertension -  continue on current regimen - amLODIPine (NORVASC) 5 mg tablet, carvediloL (COREG) 12.5 mg tablet BID and valsartan-hydroCHLOROthiazide (DIOVAN-HCT) 320-25 mg per tablet, he will continue to monitor his BP and follow up with his primary care provider for reevaluation/adjustment of medications if warranted.   I have reviewed the relationship between hypertension as it relates to sleep-disordered breathing. 3. Recommended a dedicated weight loss program through appropriate diet and exercise regimen as significant weight reduction has been shown to reduce severity of obstructive sleep apnea. SUBJECTIVE/OBJECTIVE:    Deepali Galeano is an 42 y.o. male referred for evaluation for a sleep disorder. He complains of snoring associated with awakening in the middle of the night because of no specific reason. Symptoms began 2 years ago, gradually worsening since that time. He usually can fall asleep in 5 minutes. Family or house members note snoring. He denies of symptoms indicative of cataplexy, sleep paralysis or sleep related hallucinations. He denies of a history of unusual movements occurring during sleep. Anthony Daniel (P) does not wake up frequently at night. He (P) is not bothered by waking up too early and left unable to get back to sleep. He actually sleeps about (P) 8 hours at night and wakes up about (P) 2 times during the night. He (P) does not work shifts:  .   Queta Samayoa indicates he (P) does not get too little sleep at night. His bedtime is (P) 2300. He awakens at (P) 8244. He (P) does not take naps. . He has the following observed behaviors: (P) Loud snoring, Light snoring, Twitching of legs or feet;  . Other remarks: The patient has not undergone diagnostic testing for the current problems. Review of Systems:  Constitutional:  No significant weight loss or weight gain  Eyes:  No blurred vision  CVS:  No significant chest pain  Pulm:  No significant shortness of breath  GI:  No significant nausea or vomiting  :  No significant nocturia  Musculoskeletal:  No significant joint pain at night  Skin:  No significant rashes  Neuro:  No significant dizziness   Psych:  No active mood issues    Sleep Review of Systems: notable for Negative difficulty falling asleep;  Positive awakenings at night; Positive perceived regular dreaming; Negative nightmares; Negative  early morning headaches; Negative  memory problems; Negative  concentration issues; Negative caffeine;  Negative alcohol;   Negative history of any automobile or occupational accidents due to daytime drowsiness. [place] Sleepiness Score: (P) 5   and Modified F.O.S.Q. Score Total / 2: (P) 19.5    Visit Vitals  /77 (BP 1 Location: Left upper arm, BP Patient Position: Sitting, BP Cuff Size: Adult)   Pulse 75   Temp 98.2 °F (36.8 °C) (Temporal)   Ht 6' (1.829 m)   Wt 200 lb 8 oz (90.9 kg)   SpO2 100%   BMI 27.19 kg/m²    Neck circ. in \"inches\": 16      General:   Alert, oriented, not in acute distress   Eyes:  Anicteric Sclerae; intact EOM's   Nose:  No obvious nasal septum deviation    Oropharynx:   Mallampati score 4, thick tongue base, uvula not seen due to low-lying soft palate, narrow tonsilo-pharyngeal pilars, tongue scalloped   Neck:   midline trachea,  no JVD   Chest/Lungs:  symmetrical lung expansion ,clear lung fields on auscultation    CVS:  Normal rate, regular rhythm    Extremities:  No obvious rashes, absent edema    Neuro:  No focal deficits; No obvious tremor    Psych:  Normal eye contact; normal  affect, normal countenance          Shanita Cole MD, FAASM  Diplomate American Board of Sleep Medicine  Diplomate in Sleep Medicine - ABP    Electronically signed.  01/20/23

## 2023-01-20 NOTE — PATIENT INSTRUCTIONS
[address] [facility] [place], [address]  Tel.  [phone]  Fax. [address]  [place], [address] [facility]  Tel.  [phone]  Fax. [phone] [address] [place]  Tel.  [phone]  Fax. [phone]     Sleep Apnea: After Your Visit  Your Care Instructions  Sleep apnea occurs when you frequently stop breathing for 10 seconds or longer during sleep. It can be mild to severe, based on the number of times per hour that you stop breathing or have slowed breathing. Blocked or narrowed airways in your nose, mouth, or throat can cause sleep apnea. Your airway can become blocked when your throat muscles and tongue relax during sleep. Sleep apnea is common, occurring in 1 out of 20 individuals. Individuals having any of the following characteristics should be evaluated and treated right away due to high risk and detrimental consequences from untreated sleep apnea:  Obesity  Congestive Heart failure  Atrial Fibrillation  Uncontrolled Hypertension  Type II Diabetes  Night-time Arrhythmias  Stroke  Pulmonary Hypertension  High-risk Driving Populations (pilots, truck drivers, etc.)  Patients Considering Weight-loss Surgery    How do you know you have sleep apnea? You probably have sleep apnea if you answer 'yes' to 3 or more of the following questions:  S - Have you been told that you Snore? T - Are you often Tired during the day? O - Has anyone Observed you stop breathing while sleeping? P- Do you have (or are being treated for) high blood Pressure? B - Are you obese (Body Mass Index > 35)? A - Is your Age 48years old or older? N - Is your Neck size greater than 16 inches? G - Are you male Gender? A sleep physician can prescribe a breathing device that prevents tissues in the throat from blocking your airway. Or your doctor may recommend using a dental device (oral breathing device) to help keep your airway open.  In some cases, surgery may be needed to remove enlarged tissues in the throat. Follow-up care is a key part of your treatment and safety. Be sure to make and go to all appointments, and call your doctor if you are having problems. It's also a good idea to know your test results and keep a list of the medicines you take. How can you care for yourself at home? Lose weight, if needed. It may reduce the number of times you stop breathing or have slowed breathing. Go to bed at the same time every night. Sleep on your side. It may stop mild apnea. If you tend to roll onto your back, sew a pocket in the back of your paArstasis top. Put a tennis ball into the pocket, and stitch the pocket shut. This will help keep you from sleeping on your back. Avoid alcohol and medicines such as sleeping pills and sedatives before bed. Do not smoke. Smoking can make sleep apnea worse. If you need help quitting, talk to your doctor about stop-smoking programs and medicines. These can increase your chances of quitting for good. Prop up the head of your bed 4 to 6 inches by putting bricks under the legs of the bed. Treat breathing problems, such as a stuffy nose, caused by a cold or allergies. Use a continuous positive airway pressure (CPAP) breathing machine if lifestyle changes do not help your apnea and your doctor recommends it. The machine keeps your airway from closing when you sleep. If CPAP does not help you, ask your doctor whether you should try other breathing machines. A bilevel positive airway pressure machine has two types of air pressureâone for breathing in and one for breathing out. Another device raises or lowers air pressure as needed while you breathe. If your nose feels dry or bleeds when using one of these machines, talk with your doctor about increasing moisture in the air. A humidifier may help.   If your nose is runny or stuffy from using a breathing machine, talk with your doctor about using decongestants or a corticosteroid nasal spray.  When should you call for help? Watch closely for changes in your health, and be sure to contact your doctor if:  You still have sleep apnea even though you have made lifestyle changes. You are thinking of trying a device such as CPAP. You are having problems using a CPAP or similar machine. Where can you learn more? Go to [employer]. Enter V821 in the search box to learn more about \"Sleep Apnea: After Your Visit. \"   © 6274-3921 Healthwise, Incorporated. Care instructions adapted under license by [employer] (which disclaims liability or warranty for this information). This care instruction is for use with your licensed healthcare professional. If you have questions about a medical condition or this instruction, always ask your healthcare professional. Ara Jansen any warranty or liability for your use of this information. PROPER SLEEP HYGIENE    What to avoid  Do not have drinks with caffeine, such as coffee or black tea, for 8 hours before bed. Do not smoke or use other types of tobacco near bedtime. Nicotine is a stimulant and can keep you awake. Avoid drinking alcohol late in the evening, because it can cause you to wake in the middle of the night. Do not eat a big meal close to bedtime. If you are hungry, eat a light snack. Do not drink a lot of water close to bedtime, because the need to urinate may wake you up during the night. Do not read or watch TV in bed. Use the bed only for sleeping and sexual activity. What to try  Go to bed at the same time every night, and wake up at the same time every morning. Do not take naps during the day. Keep your bedroom quiet, dark, and cool. Get regular exercise, but not within 3 to 4 hours of your bedtime. .  Sleep on a comfortable pillow and mattress. If watching the clock makes you anxious, turn it facing away from you so you cannot see the time.   If you worry when you lie down, start a worry book. Well before bedtime, write down your worries, and then set the book and your concerns aside. Try meditation or other relaxation techniques before you go to bed. If you cannot fall asleep, get up and go to another room until you feel sleepy. Do something relaxing. Repeat your bedtime routine before you go to bed again. Make your house quiet and calm about an hour before bedtime. Turn down the lights, turn off the TV, log off the computer, and turn down the volume on music. This can help you relax after a busy day. Drowsy Driving  The [address] [place] [address] Road Traffic Safety Administration cites drowsiness as a causing factor in more than 142,705 police reported crashes annually, resulting in 76,000 injuries and 1,500 deaths. Other surveys suggest 55% of people polled have driven while drowsy in the past year, 23% had fallen asleep but not crashed, 3% crashed, and 2% had and accident due to drowsy driving. Who is at risk? Young Drivers: One study of drowsy driving accidents states that 55% of the drivers were under 25 years. Of those, 75% were male. Shift Workers and Travelers: People who work overnight or travel across time zones frequently are at higher risk of experiencing Circadian Rhythm Disorders. They are trying to work and function when their body is programed to sleep. Sleep Deprived: Lack of sleep has a serious impact on your ability to pay attention or focus on a task. Consistently getting less than the average of 8 hours your body needs creates partial or cumulative sleep deprivation. Untreated Sleep Disorders: Sleep Apnea, Narcolepsy, R.L.S., and other sleep disorders (untreated) prevent a person from getting enough restful sleep. This leads to excessive daytime sleepiness and increases the risk for drowsy driving accidents by up to 7 times. Medications / Alcohol: Even over the counter medications can cause drowsiness.  Medications that impair a drivers attention should have a warning label. Alcohol naturally makes you sleepy and on its own can cause accidents. Combined with excessive drowsiness its effects are amplified. Signs of Drowsy Driving:   * You don't remember driving the last few miles   * You may drift out of your miles   * You are unable to focus and your thoughts wander   * You may yawn more often than normal   * You have difficulty keeping your eyes open / nodding off   * Missing traffic signs, speeding, or tailgating  Prevention-   Good sleep hygiene, lifestyle and behavioral choices have the most impact on drowsy driving. There is no substitute for sleep and the average person requires 8 hours nightly. If you find yourself driving drowsy, stop and sleep. Consider the sleep hygiene tips provided during your visit as well. Medication Refill Policy: Refills for all medications require 1 week advance notice. Please have your pharmacy fax a refill request. We are unable to fax, or call in \"controled substance\" medications and you will need to pick these prescriptions up from our office. [employer] Activation    Thank you for requesting access to [employer]. Please follow the instructions below to securely access and download your online medical record. [employer] allows you to send messages to your doctor, view your test results, renew your prescriptions, schedule appointments, and more. How Do I Sign Up? In your internet browser, go to [email or web address]. Click on the First Time User? Click Here link in the Sign In box. You will see the New Member Sign Up page. Enter your [employer] Access Code exactly as it appears below. You will not need to use this code after youve completed the sign-up process. If you do not sign up before the expiration date, you must request a new code. [employer] Access Code:  Activation code not generated  Current [employer] Status: Active (This is the date your [employer] access code will )    Enter the last four digits of your Social Security Number (xxxx) and Date of Birth (mm/dd/yyyy) as indicated and click Submit. You will be taken to the next sign-up page. Create a [employer] ID. This will be your [employer] login ID and cannot be changed, so think of one that is secure and easy to remember. Create a [employer] password. You can change your password at any time. Enter your Password Reset Question and Answer. This can be used at a later time if you forget your password. Enter your e-mail address. You will receive e-mail notification when new information is available in [address] Click Sign Up. You can now view and download portions of your medical record. Click the [employer] link to download a portable copy of your medical information. Additional Information    If you have questions, please call [phone]. Remember, [employer] is NOT to be used for urgent needs. For medical emergencies, dial 911.

## 2023-01-31 ENCOUNTER — DOCUMENTATION ONLY (OUTPATIENT)
Dept: CARDIOLOGY CLINIC | Age: 38
End: 2023-01-31

## 2023-01-31 ENCOUNTER — TELEPHONE (OUTPATIENT)
Dept: CARDIOLOGY CLINIC | Age: 38
End: 2023-01-31

## 2023-01-31 DIAGNOSIS — R00.2 PALPITATIONS: Primary | ICD-10-CM

## 2023-01-31 DIAGNOSIS — I48.91 ATRIAL FIBRILLATION, NEW ONSET (HCC): ICD-10-CM

## 2023-01-31 NOTE — TELEPHONE ENCOUNTER
Patient has Atrial Fib on heart monitor which is a new diagnosis for him - please call him to discuss    Please ask him to have Mg level and TSH checked for palpitations/Atrial Fib  Please schedule him for echocardiogram for palpitations/Atrial Fib    Please let him know I would like him to see EP/Dr. Tayo Russo to discuss treatment options - first available appt 3/30 at 1pm and Ethyl Prime already put him on the schedule    He needs to avoid caffeine, alcohol and proceed with sleep study/evaluation of sleep apnea  He is very low risk for stroke so no OAC is needed at this time    We can discuss it further at his follow up visit with me in 2 weeks  Let me know if he wants to come in sooner (maybe to [place]) or if he has any questions    Future Appointments   Date Time Provider Nati Kennedy   2/14/2023  9:40 AM Cass Miranda, EDUARD FOY AMB   2/16/2023  9:30 AM [facility] DANII AMB   3/30/2023  1:00 PM Matilda Cooper MD CAVREY BS AMB

## 2023-01-31 NOTE — TELEPHONE ENCOUNTER
Spoke with patient. 2 patient identifiers used. Notified patient of below. Patient has Atrial Fib on heart monitor which is a new diagnosis for him - please call him to discuss     Please ask him to have Mg level and TSH checked for palpitations/Atrial Fib  Please schedule him for echocardiogram for palpitations/Atrial Fib     Please let him know I would like him to see EP/Dr. Vanessa Barber to discuss treatment options - first available appt 3/30 at 1pm and Klaus Cage already put him on the schedule     He needs to avoid caffeine, alcohol and proceed with sleep study/evaluation of sleep apnea  He is very low risk for stroke so no OAC is needed at this time     We can discuss it further at his follow up visit with me in 2 weeks  Let me know if he wants to come in sooner (maybe to [place]) or if he has any questions    Patient verbalized understanding of above and patient was scheduled and rescheduled for appointments. Let patient know to have labs done this week. Let patient know if he has any questions or concerns to please call our office.      Future Appointments   Date Time Provider Nati Kennedy   2/7/2023  2:00 PM Romy FOY AMB   2/13/2023  9:40 AM Vilma Miranda NP cav BS AMB   2/16/2023  9:30 AM [facility]   3/30/2023  1:00 PM Matilda Cooper MD CAVREY BS AMB

## 2023-02-07 ENCOUNTER — ANCILLARY PROCEDURE (OUTPATIENT)
Dept: CARDIOLOGY CLINIC | Age: 38
End: 2023-02-07
Payer: COMMERCIAL

## 2023-02-07 VITALS
SYSTOLIC BLOOD PRESSURE: 117 MMHG | HEIGHT: 72 IN | DIASTOLIC BLOOD PRESSURE: 77 MMHG | BODY MASS INDEX: 27.09 KG/M2 | WEIGHT: 200 LBS

## 2023-02-07 DIAGNOSIS — I48.91 ATRIAL FIBRILLATION, NEW ONSET (HCC): ICD-10-CM

## 2023-02-07 DIAGNOSIS — R00.2 PALPITATIONS: ICD-10-CM

## 2023-02-08 LAB
MAGNESIUM SERPL-MCNC: 2.5 MG/DL (ref 1.6–2.4)
TSH SERPL DL<=0.05 MIU/L-ACNC: 2.64 UIU/ML (ref 0.36–3.74)

## 2023-02-10 LAB
ECHO AO ASC DIAM: 2.9 CM
ECHO AO ASCENDING AORTA INDEX: 1.36 CM/M2
ECHO AO ROOT DIAM: 3.3 CM
ECHO AO ROOT INDEX: 1.55 CM/M2
ECHO AV MEAN GRADIENT: 3 MMHG
ECHO AV MEAN VELOCITY: 0.8 M/S
ECHO AV PEAK GRADIENT: 5 MMHG
ECHO AV PEAK VELOCITY: 1.2 M/S
ECHO AV VELOCITY RATIO: 0.83
ECHO AV VTI: 21.1 CM
ECHO EST RA PRESSURE: 3 MMHG
ECHO LA DIAMETER INDEX: 1.31 CM/M2
ECHO LA DIAMETER: 2.8 CM
ECHO LA TO AORTIC ROOT RATIO: 0.85
ECHO LA VOL 2C: 44 ML (ref 18–58)
ECHO LA VOL 4C: 28 ML (ref 18–58)
ECHO LA VOL BP: 35 ML (ref 18–58)
ECHO LA VOL/BSA BIPLANE: 16 ML/M2 (ref 16–34)
ECHO LA VOLUME AREA LENGTH: 40 ML
ECHO LA VOLUME INDEX A2C: 21 ML/M2 (ref 16–34)
ECHO LA VOLUME INDEX A4C: 13 ML/M2 (ref 16–34)
ECHO LA VOLUME INDEX AREA LENGTH: 19 ML/M2 (ref 16–34)
ECHO LV E' LATERAL VELOCITY: 13 CM/S
ECHO LV E' SEPTAL VELOCITY: 13 CM/S
ECHO LV EDV A2C: 87 ML
ECHO LV EDV A4C: 100 ML
ECHO LV EDV BP: 95 ML (ref 67–155)
ECHO LV EDV INDEX A4C: 47 ML/M2
ECHO LV EDV INDEX BP: 45 ML/M2
ECHO LV EDV NDEX A2C: 41 ML/M2
ECHO LV EJECTION FRACTION A2C: 66 %
ECHO LV EJECTION FRACTION A4C: 38 %
ECHO LV EJECTION FRACTION BIPLANE: 55 % (ref 55–100)
ECHO LV ESV A2C: 29 ML
ECHO LV ESV A4C: 62 ML
ECHO LV ESV BP: 43 ML (ref 22–58)
ECHO LV ESV INDEX A2C: 14 ML/M2
ECHO LV ESV INDEX A4C: 29 ML/M2
ECHO LV ESV INDEX BP: 20 ML/M2
ECHO LV FRACTIONAL SHORTENING: 31 % (ref 28–44)
ECHO LV INTERNAL DIMENSION DIASTOLE INDEX: 2.25 CM/M2
ECHO LV INTERNAL DIMENSION DIASTOLIC: 4.8 CM (ref 4.2–5.9)
ECHO LV INTERNAL DIMENSION SYSTOLIC INDEX: 1.55 CM/M2
ECHO LV INTERNAL DIMENSION SYSTOLIC: 3.3 CM
ECHO LV IVSD: 1.1 CM (ref 0.6–1)
ECHO LV MASS 2D: 158.8 G (ref 88–224)
ECHO LV MASS INDEX 2D: 74.6 G/M2 (ref 49–115)
ECHO LV POSTERIOR WALL DIASTOLIC: 0.8 CM (ref 0.6–1)
ECHO LV RELATIVE WALL THICKNESS RATIO: 0.33
ECHO LVOT AV VTI INDEX: 0.94
ECHO LVOT MEAN GRADIENT: 2 MMHG
ECHO LVOT PEAK GRADIENT: 4 MMHG
ECHO LVOT PEAK VELOCITY: 1 M/S
ECHO LVOT VTI: 19.8 CM
ECHO MV A VELOCITY: 0.56 M/S
ECHO MV AREA PHT: 3.3 CM2
ECHO MV E DECELERATION TIME (DT): 230 MS
ECHO MV E VELOCITY: 0.67 M/S
ECHO MV E/A RATIO: 1.2
ECHO MV E/E' LATERAL: 5.15
ECHO MV E/E' RATIO (AVERAGED): 5.15
ECHO MV E/E' SEPTAL: 5.15
ECHO MV PRESSURE HALF TIME (PHT): 66.7 MS
ECHO RA AREA 4C: 14.9 CM2
ECHO RA END SYSTOLIC VOLUME APICAL 4 CHAMBER INDEX BSA: 19 ML/M2
ECHO RA VOLUME AREA LENGTH APICAL 4 CHAMBER: 41 ML
ECHO RA VOLUME: 40 ML
ECHO RIGHT VENTRICULAR SYSTOLIC PRESSURE (RVSP): 19 MMHG
ECHO RV FREE WALL PEAK S': 12 CM/S
ECHO RV INTERNAL DIMENSION: 3.1 CM
ECHO RV TAPSE: 2.2 CM (ref 1.7–?)
ECHO TV REGURGITANT MAX VELOCITY: 1.98 M/S
ECHO TV REGURGITANT PEAK GRADIENT: 16 MMHG

## 2023-02-12 NOTE — PROGRESS NOTES
[place]:   [phone]    HPI: Teresa Reyes, a 42y.o. year-old who presents for follow up regarding his palpitations    Recent loop monitor showed 11.6% Atrial Fib - maximum heart rate 181 bpm, minimum heart rate 39 bpm  Longest episode 4 hr 11 min. Fastest episode 181 bpm.  PVCs and PACs, 8 occurrences of SVT - longest 4 beats, fastest 139 bpm  K 4.0, Mg 2.5, TSH 2.64  He is having a sleep study this week to evaluate for MILENA   Echo showed EF 40-71%, normal diastolic dysfunction, trace TR  He reports drinking minimal alcohol and caffeine  He continues to have episodes of palpitations without any high risk features  No dizziness, lightheadedness or syncope   BP well controlled  He is feeling pretty good but reports that getting time to exercise is hard  His stress level remains moderate, works and has a 10 yo and 3 yo at home   No chest pain or dyspnea with exertion  He does snore but denies morning headaches, has some \"lethargy\" during the day   No LE edema     Assessment/Plan:    1. HTN- well controlled, continue coreg 12.5mg BID, amlodipine 5mg in the evening, advised him to stop valsartan HCT and begin valsartan 80mg daily to prohibit any electrolyte abnormalities related to HCTZ, asked him to monitor BP at home after making this change and to call the office if BP is running too high or too low    -had cough on lisinopril   2. Body mass index is 26.72 kg/m². Working on diet and exercise   3. Dyslipidemia - TG up to 480 in 7/20, now  but LDL up to 161  -encouraged him to continue working on diet and exercise  -he would like to avoid medication if possible, recommended he proceed with coronary calcium scan to look for evidence of early plaque to help guide therapy for his dyslipidemia   4.  Atrial Fib - new onset, 11% Afib burden with max heart rate 181 bpm, on coreg 12.5mg BID but will not increase dose for now given heart rate dips to the 30s on monitor, awaiting sleep study results to see if he has MILENA, labs well controlled, counseled on stress reduction and minimal alcohol/caffeine intake, will refer him to EP to discuss possible treatment options for the future   5. Vitamin D deficiency - now on vitamin D 2000 units daily   6. Snoring - with palpitations and HTN, having sleep study this week to assess for MILENA     Loop Monitor 1/23: 11.6% Atrial Fib - maximum heart rate 181 bpm, minimum heart rate 39 bpm  Longest episode 4 hr 11 min. Fastest episode 181 bpm.  PVCs and PACs, 8 occurrences of SVT - longest 4 beats, fastest 139 bpm  Echo 1/23: EF 86-69%, normal diastolic dysfunction, trace TR  Echo 7/17: LVEF 60 % to 65 %, no WMA, wall thickness was at the upper limits of normal, mild-moderate focal thickening of the anterior leaflet on mitral valve    Fhx mother with HTN, gm with CAD  Soc Hx: no tobacco use, drinks 1-2 drinks/week, no drug use,     He  has a past medical history of Essential hypertension and Hyperlipidemia. Cardiovascular ROS: negative for chest pain or dyspnea on exertion   Respiratory ROS: no cough or wheezing   Neurological ROS: no TIA or stroke symptoms  All other systems negative except as above. PE  Vitals:    02/13/23 0945   BP: 102/80   Pulse: 83   Resp: 20   SpO2: 98%   Weight: 197 lb (89.4 kg)   Height: 6' (1.829 m)        Body mass index is 26.72 kg/m².    General appearance - alert, well appearing, and in no distress  Mental status - affect appropriate to mood  Eyes - sclera anicteric, moist mucous membranes  Neck - supple, no carotid bruits   Lymphatics - not assessed   Chest - clear to auscultation, no wheezes, rales or rhonchi  Heart - normal rate, regular rhythm, normal S1, S2, no murmurs, rubs, clicks or gallops  Abdomen - soft, nontender, nondistended  Back exam - full range of motion  Neurological - cranial nerves II through XII grossly intact, no focal deficit  Musculoskeletal - normal strength  Extremities - peripheral pulses normal, no pedal edema  Skin - normal coloration  no rashes    Recent Labs:  Lab Results   Component Value Date/Time    Cholesterol, total 253 (H) 12/09/2022 09:38 AM    HDL Cholesterol 44 12/09/2022 09:38 AM    LDL, calculated 161 (H) 12/09/2022 09:38 AM    Triglyceride 240 (H) 12/09/2022 09:38 AM    CHOL/HDL Ratio 5.8 (H) 12/09/2022 09:38 AM     Lab Results   Component Value Date/Time    Creatinine 1.21 12/09/2022 09:38 AM     Lab Results   Component Value Date/Time    BUN 22 (H) 12/09/2022 09:38 AM     Lab Results   Component Value Date/Time    Potassium 4.0 12/09/2022 09:38 AM     No results found for: HBA1C, FUT9ABTA, KNB3PWUY  Lab Results   Component Value Date/Time    HGB 14.3 12/09/2022 09:38 AM     Lab Results   Component Value Date/Time    PLATELET 120 21/97/0916 09:38 AM       Reviewed:  Past Medical History:   Diagnosis Date    Essential hypertension     Hyperlipidemia      Social History     Tobacco Use   Smoking Status Never   Smokeless Tobacco Never     Social History     Substance and Sexual Activity   Alcohol Use Yes    Alcohol/week: 0.0 standard drinks    Comment: 2-3 times a week     Allergies   Allergen Reactions    Lisinopril Cough    Penicillins Unknown (comments)       Current Outpatient Medications   Medication Sig    valsartan (DIOVAN) 80 mg tablet Take 1 Tablet by mouth daily. cholecalciferol (VITAMIN D3) 25 mcg (1,000 unit) cap Take 1,000 Units by mouth daily. carvediloL (COREG) 12.5 mg tablet Take 1 Tablet by mouth two (2) times a day. amLODIPine (NORVASC) 5 mg tablet Take 1 Tablet by mouth daily. No current facility-administered medications for this visit.        Cass Zaragoza, NP  [address] [facility] [place]  [address]

## 2023-02-13 ENCOUNTER — OFFICE VISIT (OUTPATIENT)
Facility: CLINIC | Age: 38
End: 2023-02-13
Payer: COMMERCIAL

## 2023-02-13 VITALS
DIASTOLIC BLOOD PRESSURE: 80 MMHG | SYSTOLIC BLOOD PRESSURE: 102 MMHG | OXYGEN SATURATION: 98 % | RESPIRATION RATE: 20 BRPM | BODY MASS INDEX: 26.68 KG/M2 | WEIGHT: 197 LBS | HEIGHT: 72 IN | HEART RATE: 83 BPM

## 2023-02-13 DIAGNOSIS — I48.0 PAROXYSMAL ATRIAL FIBRILLATION (HCC): Primary | ICD-10-CM

## 2023-02-13 DIAGNOSIS — I10 ESSENTIAL HYPERTENSION: ICD-10-CM

## 2023-02-13 DIAGNOSIS — E78.5 DYSLIPIDEMIA: ICD-10-CM

## 2023-02-13 DIAGNOSIS — R06.83 SNORING: ICD-10-CM

## 2023-02-13 PROCEDURE — 3074F SYST BP LT 130 MM HG: CPT | Performed by: NURSE PRACTITIONER

## 2023-02-13 PROCEDURE — 99214 OFFICE O/P EST MOD 30 MIN: CPT | Performed by: NURSE PRACTITIONER

## 2023-02-13 PROCEDURE — 3079F DIAST BP 80-89 MM HG: CPT | Performed by: NURSE PRACTITIONER

## 2023-02-13 RX ORDER — VALSARTAN 80 MG/1
80 TABLET ORAL DAILY
Qty: 90 TABLET | Refills: 1 | Status: SHIPPED | OUTPATIENT
Start: 2023-02-13

## 2023-02-13 NOTE — PATIENT INSTRUCTIONS
Please stop taking valsartan HCT and begin valsartan 80mg daily  Please check your blood pressure daily (at least one hour after your morning blood pressure medications.)  Keep a written record of your blood pressure readings and bring it to each appointment  f your systolic blood pressure is consistently greater than 150mmHg or less than 100mmHg then please call the office. Please continue to exercise and limit your intake of caffeine and/or alcohol. Please follow up with sleep medicine for sleep apnea evaluation  We will schedule you to see an electrophysiologist to discuss your treatment options for Atrial Fib. Please consider having a coronary calcium scan (heart scan) done to look for evidence of early plaque in the arteries of your heart. You should call [phone] to schedule this. This test is not covered by insurance and will cost you approximately $129 this month. Please let the imaging center know that you are a patient here so they can send us the results.

## 2023-02-13 NOTE — Clinical Note
Very nice agapito I have followed for a while for HTN, recent onset of palps with AFib, sent loop monitor results to scanning today

## 2023-02-16 ENCOUNTER — OFFICE VISIT (OUTPATIENT)
Dept: SLEEP MEDICINE | Age: 38
End: 2023-02-16

## 2023-02-16 DIAGNOSIS — G47.33 OSA (OBSTRUCTIVE SLEEP APNEA): Primary | ICD-10-CM

## 2023-02-16 DIAGNOSIS — I10 PRIMARY HYPERTENSION: ICD-10-CM

## 2023-02-20 ENCOUNTER — TELEPHONE (OUTPATIENT)
Dept: SLEEP MEDICINE | Age: 38
End: 2023-02-20

## 2023-02-20 DIAGNOSIS — G47.33 OSA (OBSTRUCTIVE SLEEP APNEA): Primary | ICD-10-CM

## 2023-02-21 ENCOUNTER — PATIENT MESSAGE (OUTPATIENT)
Dept: SLEEP MEDICINE | Age: 38
End: 2023-02-21

## 2023-02-21 NOTE — TELEPHONE ENCOUNTER
Results have been sent to  [facility]. Please fax dental referral and schedule follow-up visit in 3-4 months.     Thanks

## 2023-02-21 NOTE — TELEPHONE ENCOUNTER
Roxanne Esposito is to be contacted by lead sleep technologist regarding results of Sleep Testing which was indicative of an average AHI of 8.7 per hour with an SpO2 zakiya of 86% and SpO2 of < 88% being 2.3 minutes. * Treatment options were offered at initial visit. He had elected to proceed with a trial of using an Oral Device (Mandibular Advancing Device, Tongue Retention Device, etc.) which has been shown to be effective treatment for obstructive sleep apnea. * We have referred the patient to Dentistry for oral appliance evaluation. The respiratory disturbance noted above occurred predominantly in supine position. Patient would therefore benefit from sleeping on the side using extra pillows or one of the commercially available sleep positioning devices. Follow-up office visit and re-testing to be done in 3-4 months after initiation of oral appliance therapy to assess efficacy of therapy. Encounter Diagnosis   Name Primary?     MILENA (obstructive sleep apnea) Yes       Orders Placed This Encounter    REFERRAL TO DENTISTRY     Referral Priority:   Routine     Referral Type:   Consultation     Referral Reason:   Specialty Services Required     Referred to Provider:   Jimenez Bell DDS     Number of Visits Requested:   1

## 2023-02-21 NOTE — TELEPHONE ENCOUNTER
Results sent via [employer]. Please fax dental referral and schedule follow-up visit in 3-4 months.     Thanks

## 2023-03-02 ENCOUNTER — TELEPHONE (OUTPATIENT)
Dept: CARDIOLOGY CLINIC | Age: 38
End: 2023-03-02

## 2023-03-02 NOTE — TELEPHONE ENCOUNTER
----- Message from Gabo Guy NP sent at 3/2/2023 11:03 AM EST -----  Pls call patient and have him take valsartan 80 mg BID, continue other meds      This was his question    Good morning, I have been taking the 80mg dosage of Valsartin since Monday, 2/20 and wanted to give you an update on my bp readings:  146/88, 126/88, 129/88, 140/98, 149/81, 144/83, 121/80, 145/85.      Would you advise I increase the dosage? Thank you!

## 2023-03-02 NOTE — TELEPHONE ENCOUNTER
Spoke with patient. 2 patient identifiers used. ----- Message from Vianney Riggs NP sent at 3/2/2023 11:03 AM EST -----  Pls call patient and have him take valsartan 80 mg BID, continue other meds    Went over result note above with patient, patient wants to know since he used to take 320 mg of valsartan - 25 mg HCTZ in the mornings, is it okay for him to take 160 mg of Valsartan in the morning instead of twice a day. Let patient know I would ask NP and let him know. Patient verbalized understanding and was appreciative of call.

## 2023-03-02 NOTE — TELEPHONE ENCOUNTER
Jeff Kline., NP  You Just now (3:41 PM)     NL  That's fine! You  Jeff Kline., NP 19 minutes ago (3:22 PM)     EL  Patient wants to know since he used to take 320 mg of valsartan - 25 mg HCTZ in the mornings, is it okay for him to take 160 mg of Valsartan in the morning instead of twice a day. Spoke with patient. 2 patient identifiers used. Notified patient that it is fine for hi to 160 mg of valsartan in the morning instead of twice a day. Patient verbalized understanding and let patient know to update us with BP readings next week.

## 2023-07-31 PROBLEM — I48.0 PAF (PAROXYSMAL ATRIAL FIBRILLATION) (HCC): Status: ACTIVE | Noted: 2023-07-31

## 2023-07-31 PROBLEM — Z79.01 ANTICOAGULATION MANAGEMENT ENCOUNTER: Status: ACTIVE | Noted: 2023-07-31

## 2023-07-31 PROBLEM — Z51.81 ANTICOAGULATION MANAGEMENT ENCOUNTER: Status: ACTIVE | Noted: 2023-07-31

## 2023-08-24 PROBLEM — I10 PRIMARY HYPERTENSION: Status: ACTIVE | Noted: 2023-08-24

## 2023-08-25 ENCOUNTER — OFFICE VISIT (OUTPATIENT)
Age: 38
End: 2023-08-25

## 2023-08-25 VITALS
SYSTOLIC BLOOD PRESSURE: 138 MMHG | BODY MASS INDEX: 27.33 KG/M2 | RESPIRATION RATE: 14 BRPM | HEIGHT: 72 IN | WEIGHT: 201.8 LBS | HEART RATE: 84 BPM | OXYGEN SATURATION: 97 % | DIASTOLIC BLOOD PRESSURE: 88 MMHG

## 2023-08-25 DIAGNOSIS — I48.0 PAF (PAROXYSMAL ATRIAL FIBRILLATION) (HCC): Primary | ICD-10-CM

## 2023-08-25 DIAGNOSIS — G47.33 OSA (OBSTRUCTIVE SLEEP APNEA): ICD-10-CM

## 2023-08-25 DIAGNOSIS — I10 PRIMARY HYPERTENSION: ICD-10-CM

## 2023-08-25 DIAGNOSIS — Z79.899 LONG TERM CURRENT USE OF ANTIARRHYTHMIC DRUG: ICD-10-CM

## 2023-08-25 RX ORDER — FLECAINIDE ACETATE 100 MG/1
100 TABLET ORAL 2 TIMES DAILY
Qty: 180 TABLET | Refills: 3 | Status: SHIPPED | OUTPATIENT
Start: 2023-08-25

## 2023-08-25 NOTE — PATIENT INSTRUCTIONS
Start Flecainide 100 mg twice a day  Schedule for exercise stress test at least >2 weeks after starting the Flecainide  Start Xarelto 20 mg daily with meals  Please call us if you want to proceed with ablation  FU with Dr. Jaen Payment in 3 months

## 2023-09-14 ENCOUNTER — TELEPHONE (OUTPATIENT)
Age: 38
End: 2023-09-14

## 2023-09-14 DIAGNOSIS — G47.33 OSA (OBSTRUCTIVE SLEEP APNEA): Primary | ICD-10-CM

## 2023-09-14 NOTE — TELEPHONE ENCOUNTER
Dr. Esteban Gonsales wants a 2 night HSAT on OAT before patients follow up with you on 10/3. Requesting order for Maral Can, first available 9/27. LM with patient to schedule that appointment and call if it doesn't work. Blocked next days HSAT  to reflect two night study.

## 2023-09-14 NOTE — TELEPHONE ENCOUNTER
Perform testing on 2 consecutive nights. Encounter Diagnosis   Name Primary?     CARLOS (obstructive sleep apnea) Yes         Orders Placed This Encounter   Procedures    SLEEP STUDY UNATTENDED, 4 CHANNEL     Standing Status:   Future     Standing Expiration Date:   9/14/2024

## 2023-09-19 RX ORDER — VALSARTAN 160 MG/1
160 TABLET ORAL DAILY
Qty: 90 TABLET | Refills: 1 | Status: SHIPPED | OUTPATIENT
Start: 2023-09-19

## 2023-09-19 NOTE — TELEPHONE ENCOUNTER
Requested Prescriptions     Signed Prescriptions Disp Refills    valsartan (DIOVAN) 160 MG tablet 90 tablet 1     Sig: TAKE 1 TABLET BY MOUTH EVERY DAY     Authorizing Provider: Farnaz Razo     Ordering User: Jeremy GARCIA per NP     Future Appointments   Date Time Provider 4600  46[facility]   9/27/2023 10:00 AM TECH[facility] BS AMB   10/3/2023 10:20 AM Fadumo Busby MD [facility] BS AMB   11/7/2023  3:20 PM MD SHIRA Bose BS AMB

## 2023-09-27 ENCOUNTER — HOSPITAL ENCOUNTER (OUTPATIENT)
Facility: HOSPITAL | Age: 38
Discharge: HOME | End: 2023-09-30
Payer: COMMERCIAL

## 2023-09-27 ENCOUNTER — PROCEDURE VISIT (OUTPATIENT)
Age: 38
End: 2023-09-27

## 2023-09-27 DIAGNOSIS — G47.33 OSA (OBSTRUCTIVE SLEEP APNEA): Primary | ICD-10-CM

## 2023-09-27 DIAGNOSIS — G47.33 OSA (OBSTRUCTIVE SLEEP APNEA): ICD-10-CM

## 2023-09-27 PROCEDURE — G0400 HOME SLEEP TEST/TYPE 4 PORTA: HCPCS | Performed by: INTERNAL MEDICINE

## 2023-09-27 NOTE — PROGRESS NOTES
S>Carlos Duncan is a 34 y.o. male seen today to receive a home sleep testing unit (WatchPAT). Patient was educated on proper hookup and operation of the WatchPAT HST via detailed instruction sheet . Instruction forms with after hours contact and documentation were signed. O>    There were no vitals taken for this visit. A>  No diagnosis found. P>  General information regarding operations and maintenance of the device was provided. Follow-up appointment was made to return the [facility] HST. He will be contacted once the results have been reviewed. He was asked to contact our office for any problems regarding his home sleep test study.

## 2023-09-30 ASSESSMENT — SLEEP AND FATIGUE QUESTIONNAIRES
FOSQ SCORE: 20
DO YOU HAVE DIFFICULTY OPERATING A MOTOR VEHICLE FOR LONG DISTANCES (GREATER THAN 100 MILES) BECAUSE YOU BECOME SLEEPY: NO
DO YOU HAVE DIFFICULTY VISITING YOUR FAMILY OR FRIENDS IN THEIR HOME BECAUSE YOU BECOME SLEEPY OR TIRED: NO
HOW LIKELY ARE YOU TO NOD OFF OR FALL ASLEEP WHILE SITTING AND TALKING TO SOMEONE: WOULD NEVER DOZE
HOW LIKELY ARE YOU TO NOD OFF OR FALL ASLEEP WHILE WATCHING TV: SLIGHT CHANCE OF DOZING
HAS YOUR MOOD BEEN AFFECTED BECAUSE YOU ARE SLEEPY OR TIRED: NO
HOW LIKELY ARE YOU TO NOD OFF OR FALL ASLEEP IN A CAR, WHILE STOPPED FOR A FEW MINUTES IN TRAFFIC: WOULD NEVER DOZE
HOW LIKELY ARE YOU TO NOD OFF OR FALL ASLEEP WHILE SITTING AND READING: SLIGHT CHANCE OF DOZING
HOW LIKELY ARE YOU TO NOD OFF OR FALL ASLEEP WHILE SITTING QUIETLY AFTER LUNCH WITHOUT ALCOHOL: WOULD NEVER DOZE
DO YOU HAVE DIFFICULTY BEING AS ACTIVE AS YOU WANT TO BE IN THE MORNING BECAUSE YOU ARE SLEEPY OR TIRED: NO
HOW LIKELY ARE YOU TO NOD OFF OR FALL ASLEEP WHILE SITTING AND READING: 1
HOW LIKELY ARE YOU TO NOD OFF OR FALL ASLEEP WHILE LYING DOWN TO REST IN THE AFTERNOON WHEN CIRCUMSTANCES PERMIT: MODERATE CHANCE OF DOZING
ESS TOTAL SCORE: 6
DO YOU GENERALLY HAVE DIFFICULTY REMEMBERING THINGS BECAUSE YOU ARE SLEEPY OR TIRED: NO
HOW LIKELY ARE YOU TO NOD OFF OR FALL ASLEEP WHEN YOU ARE A PASSENGER IN A CAR FOR AN HOUR WITHOUT A BREAK: SLIGHT CHANCE OF DOZING
HOW LIKELY ARE YOU TO NOD OFF OR FALL ASLEEP WHILE SITTING INACTIVE IN A PUBLIC PLACE: SLIGHT CHANCE OF DOZING
HOW LIKELY ARE YOU TO NOD OFF OR FALL ASLEEP WHILE WATCHING TV: 1
HOW LIKELY ARE YOU TO NOD OFF OR FALL ASLEEP WHILE SITTING INACTIVE IN A PUBLIC PLACE: 1
HOW LIKELY ARE YOU TO NOD OFF OR FALL ASLEEP WHILE LYING DOWN TO REST IN THE AFTERNOON WHEN CIRCUMSTANCES PERMIT: 2
HOW LIKELY ARE YOU TO NOD OFF OR FALL ASLEEP WHILE SITTING QUIETLY AFTER LUNCH WITHOUT ALCOHOL: 0
HOW LIKELY ARE YOU TO NOD OFF OR FALL ASLEEP IN A CAR, WHILE STOPPED FOR A FEW MINUTES IN TRAFFIC: 0
HAS YOUR RELATIONSHIP WITH FAMILY, FRIENDS OR WORK COLLEAGUES BEEN AFFECTED BECAUSE YOU ARE SLEEPY OR TIRED: NO
DO YOU HAVE DIFFICULTY OPERATING A MOTOR VEHICLE FOR SHORT DISTANCES (LESS THAN 100 MILES) BECAUSE YOU BECOME SLEEPY: NO
DO YOU HAVE DIFFICULTY BEING AS ACTIVE AS YOU WANT TO BE IN THE EVENING BECAUSE YOU ARE SLEEPY OR TIRED: NO
DO YOU HAVE DIFFICULTY WATCHING A MOVIE OR VIDEO BECAUSE YOU BECOME SLEEPY OR TIRED: NO
DO YOU HAVE DIFFICULTY CONCENTRATING ON THE THINGS YOU DO BECAUSE YOU ARE SLEEPY OR TIRED: NO
HOW LIKELY ARE YOU TO NOD OFF OR FALL ASLEEP WHEN YOU ARE A PASSENGER IN A CAR FOR AN HOUR WITHOUT A BREAK: 1
HOW LIKELY ARE YOU TO NOD OFF OR FALL ASLEEP WHILE SITTING AND TALKING TO SOMEONE: 0

## 2023-10-03 ENCOUNTER — TELEPHONE (OUTPATIENT)
Age: 38
End: 2023-10-03

## 2023-10-03 ENCOUNTER — OFFICE VISIT (OUTPATIENT)
Age: 38
End: 2023-10-03
Payer: COMMERCIAL

## 2023-10-03 VITALS
HEIGHT: 72 IN | OXYGEN SATURATION: 98 % | BODY MASS INDEX: 27.28 KG/M2 | SYSTOLIC BLOOD PRESSURE: 119 MMHG | WEIGHT: 201.4 LBS | DIASTOLIC BLOOD PRESSURE: 83 MMHG

## 2023-10-03 DIAGNOSIS — G47.33 OSA (OBSTRUCTIVE SLEEP APNEA): Primary | ICD-10-CM

## 2023-10-03 DIAGNOSIS — I48.0 PAF (PAROXYSMAL ATRIAL FIBRILLATION) (HCC): ICD-10-CM

## 2023-10-03 DIAGNOSIS — I10 PRIMARY HYPERTENSION: ICD-10-CM

## 2023-10-03 PROCEDURE — 99213 OFFICE O/P EST LOW 20 MIN: CPT | Performed by: INTERNAL MEDICINE

## 2023-10-03 PROCEDURE — 3074F SYST BP LT 130 MM HG: CPT | Performed by: INTERNAL MEDICINE

## 2023-10-03 PROCEDURE — 3079F DIAST BP 80-89 MM HG: CPT | Performed by: INTERNAL MEDICINE

## 2023-10-03 NOTE — PROGRESS NOTES
[address] Jessica Betancourt, [address]  Tel.  [phone]    Fax. [address] [facility], [facility] [place]  Tel.  [phone]    Fax. [phone]     [facility], [address] [facility]  Tel.  [phone]    Fax. [address] Caren Lisa (: 1985) is a 34 y.o. male, established patient, seen for Oral Appliance Therapy follow-up and evaluation:   Sleep Problem (OAT Follow up)       Mandie Gomez was last seen by me on 23, prior notes reviewed in detail. Home Sleep Apnea Test (HSAT) performed on 23 was indicative of an average AHI of 8.7 per hour with an SpO2 jennifer of 86% and SpO2 of < 88% being 2.3 minutes. ASSESSMENT/PLAN:   Diagnosis Orders   1. CARLOS (obstructive sleep apnea)        2. PAF (paroxysmal atrial fibrillation) ([address])        3. Primary hypertension        4. BMI 27.0-27.9,adult            Sleep Apnea -   * He is compliant with Oral Appliance Therapy and OAT continues to benefit patient and remains necessary for control of his sleep apnea. * Home sleep testing was done 23 to objectively assess for sleep disordered breathing on current therapy. Testing to be reviewed later today. * Counseling was provided regarding the importance of regular OAT use with emphasis on ensuring sufficient total sleep time, proper sleep hygiene, and safe driving. * Patient was asked to contact our office at any time for further questions regarding their sleep symptoms and follow-up with dentist as per their recommendation    2. Paroxysmal atrial fibrillation (HCC) - He is currently taking rivaroxaban (XARELTO) 20 mg tab tablet. He will continue on current regimen, monitor his pulse and follow up with his care provider for reevaluation/adjustment of medications if warranted. I have reviewed the relationship between atrial fibrillation as it relates to sleep-disordered breathing.     3. Hypertension -  continue on

## 2023-10-05 NOTE — TELEPHONE ENCOUNTER
Addy Jones is to be contacted by sleep technologists regarding results of WatchPAT Testing which was indicative of an average pAHI 3% of 20.5 and pAHI 4% of 8.3 per hour. SpO2 jennifer was 87% and SpO2 of < 88% was 0.00 minutes. * Patient should return for a follow-up visit to discuss oral appliance therapy. Follow-up office visit and re-testing to be done in 3-4 months after initiation of oral appliance therapy to assess efficacy of therapy. Encounter Diagnosis   Name Primary?     CARLOS (obstructive sleep apnea) Yes       Orders Placed This Encounter   Procedures    Amb External Referral To Dentistry     Referral Priority:   Routine     Referral Type:   Consult for Advice and Opinion     Referral Reason:   Specialty Services Required     Referred to Provider:   Ricardo Hobson DDS     Requested Specialty:   Dentistry     Number of Visits Requested:   1

## 2023-10-06 ENCOUNTER — TELEPHONE (OUTPATIENT)
Age: 38
End: 2023-10-06

## 2023-10-06 NOTE — TELEPHONE ENCOUNTER
Please call patient concerning results. See \[facility] [place] [facility]\"" & [facility] message.     Lyndsey Carranza,RRT,RPSGT, CSE

## 2023-10-06 NOTE — TELEPHONE ENCOUNTER
Results have been sent to  [address] [place] [address].    Please schedule results appointment to discuss OAT    Thanks

## 2023-10-09 NOTE — TELEPHONE ENCOUNTER
Left message for patient to schedule virtual visit if he wishes to discuss results of his recent sleep test.

## 2023-10-12 ENCOUNTER — TELEPHONE (OUTPATIENT)
Age: 38
End: 2023-10-12

## 2023-10-12 NOTE — TELEPHONE ENCOUNTER
Spoke with patient he reports of working with Dr. Anthony Perez to titrate his oral appliance and will return for repeat HSAT when device is fully titrated.

## 2023-11-06 PROBLEM — Z79.899 HIGH RISK MEDICATION USE: Status: ACTIVE | Noted: 2023-11-06

## 2023-11-07 ENCOUNTER — OFFICE VISIT (OUTPATIENT)
Age: 38
End: 2023-11-07

## 2023-11-07 VITALS
SYSTOLIC BLOOD PRESSURE: 164 MMHG | HEART RATE: 77 BPM | WEIGHT: 203 LBS | BODY MASS INDEX: 27.5 KG/M2 | OXYGEN SATURATION: 98 % | HEIGHT: 72 IN | RESPIRATION RATE: 18 BRPM | DIASTOLIC BLOOD PRESSURE: 98 MMHG

## 2023-11-07 DIAGNOSIS — I48.0 PAF (PAROXYSMAL ATRIAL FIBRILLATION) (HCC): Primary | ICD-10-CM

## 2023-11-07 DIAGNOSIS — Z79.01 ANTICOAGULATION MANAGEMENT ENCOUNTER: ICD-10-CM

## 2023-11-07 DIAGNOSIS — Z79.899 HIGH RISK MEDICATION USE: ICD-10-CM

## 2023-11-07 DIAGNOSIS — I10 PRIMARY HYPERTENSION: ICD-10-CM

## 2023-11-07 DIAGNOSIS — Z51.81 ANTICOAGULATION MANAGEMENT ENCOUNTER: ICD-10-CM

## 2023-11-07 RX ORDER — AMIODARONE HYDROCHLORIDE 200 MG/1
200 TABLET ORAL DAILY
Qty: 90 TABLET | Refills: 1 | Status: SHIPPED | OUTPATIENT
Start: 2023-11-28

## 2023-11-07 NOTE — PATIENT INSTRUCTIONS
Start Xarelto 20 mg daily with meals  Stop Flecainide today  In 3 weeks, start Amiodarone 400 mg twice a day x 2weeks, then down to 200 mg daily  Obtain an EKG in 3 weeks    Schedule for atrial fibrillation ablation next available  Please hold anticoagulation on the AM of the procedure  Obtain blood work prior to the procedure    For more information regarding atrial fibrillation management, please visit:  [email or web address]

## 2023-11-07 NOTE — PROGRESS NOTES
Cardiac Electrophysiology OFFICE Follow-up Note       Assessment/Plan:   1. PAF (paroxysmal atrial fibrillation) (HCC)  -     EKG 12 Lead  2. Primary hypertension  -     EKG 12 Lead  3. Anticoagulation management encounter  -     EKG 12 Lead  4. High risk medication use  -     EKG 12 Lead         Primary Cardiologist: Sebastián         Paroxysmal atrial fibrillation/flutter  Significant Afib with periods of RVR on monitor. Progressive now in persistent atrial flutter left bundle Richardean Landau C likely from flecainide effect.  - Monitor January 2023 demonstrating burden of 11.6%, longest episode lasted >4 hours with HR up to 181 bpm.   - Echo 2/7/23: EF 40-76%, normal diastolic dysfunction, trace TR  - CHADS-VASc one (HTN)  -- The implication regarding the diagnosis of AF was explained to the patient in great detail including the associated risk of CVA, AF-mediated cardiomyopathy, and progression into persistent/chronic AF.  - Literature from the EAST-AFNET 4 Trial demonstrated that early rhythm control management in patients with early diagnosis of atrial fibrillation (median time of diagnosis, 36 days) resulted in reduction in cardiovascular mortality, stroke, or hospitalization with worsening heart failure or ACS. -I have discussed options including continued medical therapy and ablation in detail. I have discussed the risks of left atrial ablation including vascular complications, infection, MI, death, stroke, cardiac tamponade, esophageal fistula, phrenic nerve paralysis, PV stenosis and need for a 2nd procedure with the pt. Patient reports complete understanding of discussions and recommendations and wishes to proceed with the procedure. - Trial flecainide 100 mg twice daily appears to be failing as patient remains in persistent atrial flutter today with left bundle branch block likely from 1C sodium blockade  -Given widened QRS, will stop flecainide today  - Following flecainide washout, in 3 weeks will

## 2023-11-08 ENCOUNTER — TELEPHONE (OUTPATIENT)
Age: 38
End: 2023-11-08

## 2023-11-08 DIAGNOSIS — R00.2 PALPITATIONS: ICD-10-CM

## 2023-11-08 DIAGNOSIS — I48.0 PAROXYSMAL ATRIAL FIBRILLATION (HCC): ICD-10-CM

## 2023-11-08 DIAGNOSIS — I48.91 UNSPECIFIED ATRIAL FIBRILLATION (HCC): ICD-10-CM

## 2023-11-08 DIAGNOSIS — I48.0 PAF (PAROXYSMAL ATRIAL FIBRILLATION) (HCC): Primary | ICD-10-CM

## 2023-11-09 NOTE — TELEPHONE ENCOUNTER
Pre procedure lab orders placed    Orders Placed This Encounter   Procedures    CBC     Standing Status:   Future     Standing Expiration Date:   43/0/1725    Basic Metabolic Panel     Standing Status:   Future     Standing Expiration Date:   11/9/2024

## 2023-11-20 ENCOUNTER — TELEPHONE (OUTPATIENT)
Age: 38
End: 2023-11-20

## 2023-11-20 NOTE — TELEPHONE ENCOUNTER
Spoke with Pt of Afib Ablation w/Dr. Cervantes at [facility]. For 2/22/24 At 10am arrive at 8am Pt aware that they need a  NPO from Midnight the night before. Check in at the first floor Outpt. Reg. Desk. Pt is to have Labs done between 1/22/24-2/15/24. Medications:  Hold Xarelto morning of procedure   VO by /nurse Laura GARCIA

## 2023-11-27 ENCOUNTER — OFFICE VISIT (OUTPATIENT)
Age: 38
End: 2023-11-27
Payer: COMMERCIAL

## 2023-11-27 DIAGNOSIS — I48.0 PAF (PAROXYSMAL ATRIAL FIBRILLATION) (HCC): Primary | ICD-10-CM

## 2023-11-27 PROCEDURE — 93000 ELECTROCARDIOGRAM COMPLETE: CPT | Performed by: NURSE PRACTITIONER

## 2023-11-27 NOTE — PROGRESS NOTES
Patient with AF/AFL today with ventricular rate 114 bpm & QTc variable, but appears to be approx 418 ms. He will start amiodarone 400 mg po bid x 2 weeks, then 200 mg po daily thereafter. He will return for repeat ECG in 2-3 weeks. If not in NSR, we would then schedule DCCV. Patient verbalizes understanding. He will continue Xarelto as well in addition to other medications as previously prescribed.

## 2023-11-28 RX ORDER — AMLODIPINE BESYLATE 5 MG/1
5 TABLET ORAL DAILY
Qty: 90 TABLET | Refills: 2 | Status: SHIPPED | OUTPATIENT
Start: 2023-11-28

## 2023-11-28 RX ORDER — CARVEDILOL 12.5 MG/1
12.5 TABLET ORAL 2 TIMES DAILY
Qty: 180 TABLET | Refills: 2 | Status: SHIPPED | OUTPATIENT
Start: 2023-11-28

## 2023-12-13 ENCOUNTER — OFFICE VISIT (OUTPATIENT)
Age: 38
End: 2023-12-13
Payer: COMMERCIAL

## 2023-12-13 DIAGNOSIS — I48.91 ATRIAL FIBRILLATION, UNSPECIFIED TYPE (HCC): Primary | ICD-10-CM

## 2023-12-13 DIAGNOSIS — R00.2 PALPITATIONS: ICD-10-CM

## 2023-12-13 PROCEDURE — 93000 ELECTROCARDIOGRAM COMPLETE: CPT | Performed by: NURSE PRACTITIONER

## 2023-12-13 NOTE — PROGRESS NOTES
Patient now NSR 69 bpm with QTc 455 ms; he has LBBB with QRSd 177 ms. He will taper amiodarone dose to 200 mg po daily today. Anticipate QRS will become at least a bit more narrow. Amiodarone is intended to be short term, as he's scheduled for ablation in 02/2024.     Future Appointments   Date Time Provider [address]   3/26/2024  8:40 AM Valarie Cervantes MD [facility] BS AMB

## 2024-02-08 LAB
BASOPHILS # BLD AUTO: 0 X10E3/UL (ref 0–0.2)
BASOPHILS NFR BLD AUTO: 1 %
BUN SERPL-MCNC: 17 MG/DL (ref 6–24)
BUN/CREAT SERPL: 12 (ref 9–20)
CALCIUM SERPL-MCNC: 9.3 MG/DL (ref 8.7–10.2)
CHLORIDE SERPL-SCNC: 102 MMOL/L (ref 96–106)
CO2 SERPL-SCNC: 25 MMOL/L (ref 20–29)
CREAT SERPL-MCNC: 1.38 MG/DL (ref 0.76–1.27)
EGFRCR SERPLBLD CKD-EPI 2021: 66 ML/MIN/1.73
EOSINOPHIL # BLD AUTO: 0.1 X10E3/UL (ref 0–0.4)
EOSINOPHIL NFR BLD AUTO: 3 %
ERYTHROCYTE [DISTWIDTH] IN BLOOD BY AUTOMATED COUNT: 12.7 % (ref 11.6–15.4)
GLUCOSE SERPL-MCNC: 97 MG/DL (ref 70–99)
HCT VFR BLD AUTO: 38.7 % (ref 37.5–51)
HGB BLD-MCNC: 12.6 G/DL (ref 13–17.7)
IMM GRANULOCYTES # BLD AUTO: 0 X10E3/UL (ref 0–0.1)
IMM GRANULOCYTES NFR BLD AUTO: 1 %
LYMPHOCYTES # BLD AUTO: 0.9 X10E3/UL (ref 0.7–3.1)
LYMPHOCYTES NFR BLD AUTO: 22 %
MCH RBC QN AUTO: 28.7 PG (ref 26.6–33)
MCHC RBC AUTO-ENTMCNC: 32.6 G/DL (ref 31.5–35.7)
MCV RBC AUTO: 88 FL (ref 79–97)
MONOCYTES # BLD AUTO: 0.5 X10E3/UL (ref 0.1–0.9)
MONOCYTES NFR BLD AUTO: 12 %
NEUTROPHILS # BLD AUTO: 2.5 X10E3/UL (ref 1.4–7)
NEUTROPHILS NFR BLD AUTO: 61 %
PLATELET # BLD AUTO: 218 X10E3/UL (ref 150–450)
POTASSIUM SERPL-SCNC: 5.2 MMOL/L (ref 3.5–5.2)
RBC # BLD AUTO: 4.39 X10E6/UL (ref 4.14–5.8)
SODIUM SERPL-SCNC: 141 MMOL/L (ref 134–144)
WBC # BLD AUTO: 4 X10E3/UL (ref 3.4–10.8)

## 2024-02-09 ENCOUNTER — PREP FOR PROCEDURE (OUTPATIENT)
Age: 39
End: 2024-02-09

## 2024-02-09 RX ORDER — SODIUM CHLORIDE 0.9 % (FLUSH) 0.9 %
5-40 SYRINGE (ML) INJECTION PRN
Status: CANCELLED | OUTPATIENT
Start: 2024-02-09

## 2024-02-09 RX ORDER — SODIUM CHLORIDE 0.9 % (FLUSH) 0.9 %
5-40 SYRINGE (ML) INJECTION EVERY 12 HOURS SCHEDULED
Status: CANCELLED | OUTPATIENT
Start: 2024-02-09

## 2024-02-09 RX ORDER — SODIUM CHLORIDE 9 MG/ML
INJECTION, SOLUTION INTRAVENOUS PRN
Status: CANCELLED | OUTPATIENT
Start: 2024-02-09

## 2024-02-13 ENCOUNTER — TELEPHONE (OUTPATIENT)
Age: 39
End: 2024-02-13

## 2024-02-13 NOTE — TELEPHONE ENCOUNTER
Pt is calling because he would like instructions and any preparations he need to know before his ablation on 2/22/24    [phone]

## 2024-02-13 NOTE — TELEPHONE ENCOUNTER
Returned patient call, ID verified using two patient identifiers.  Mr. Gonzalez is calling to review his pre- procedure instructions and what the expectations of the procedure day look like.    Reviewed at length his procedure instructions and what to expect post procedure.  Reviewed medications, labs, etc.    Patient states he had some labs done for his annual visit and it was noted that his TSH was elevated and he's never had this problem before and he's wondering if this would be related to any of his medications.  Advised him that it is most likely related to his amiodarone with will most likely be discontinued one month post ablation.    He see's Dr. Blake as his general cardiologist.  He is having his records sent over for his coronary CT scan.    Reviewed what to expect at discharge regarding medications, symptoms, etc.  Patient verbalized understanding and will call back with any other questions or concerns.    Future Appointments   Date Time Provider Department Center   3/26/2024  8:40 AM Valarie Cervantes MD [facility] AMB

## 2024-02-22 ENCOUNTER — HOSPITAL ENCOUNTER (OUTPATIENT)
Facility: HOSPITAL | Age: 39
Discharge: HOME | End: 2024-02-22
Attending: INTERNAL MEDICINE | Admitting: INTERNAL MEDICINE
Payer: COMMERCIAL

## 2024-02-22 ENCOUNTER — ANESTHESIA (OUTPATIENT)
Facility: HOSPITAL | Age: 39
End: 2024-02-22
Payer: COMMERCIAL

## 2024-02-22 ENCOUNTER — ANESTHESIA EVENT (OUTPATIENT)
Facility: HOSPITAL | Age: 39
End: 2024-02-22
Payer: COMMERCIAL

## 2024-02-22 VITALS
TEMPERATURE: 98.1 F | BODY MASS INDEX: 26.41 KG/M2 | HEIGHT: 72 IN | HEART RATE: 72 BPM | DIASTOLIC BLOOD PRESSURE: 82 MMHG | WEIGHT: 195 LBS | SYSTOLIC BLOOD PRESSURE: 133 MMHG | RESPIRATION RATE: 15 BRPM | OXYGEN SATURATION: 99 %

## 2024-02-22 DIAGNOSIS — I48.91 ATRIAL FIBRILLATION, UNSPECIFIED TYPE (HCC): ICD-10-CM

## 2024-02-22 LAB
ABO + RH BLD: NORMAL
BLOOD GROUP ANTIBODIES SERPL: NORMAL
ECHO BSA: 2.12 M2
ECHO BSA: 2.12 M2
SPECIMEN EXP DATE BLD: NORMAL

## 2024-02-22 PROCEDURE — 6360000002 HC RX W HCPCS: Performed by: NURSE ANESTHETIST, CERTIFIED REGISTERED

## 2024-02-22 PROCEDURE — C1760 CLOSURE DEV, VASC: HCPCS | Performed by: INTERNAL MEDICINE

## 2024-02-22 PROCEDURE — 2580000003 HC RX 258

## 2024-02-22 PROCEDURE — C1766 INTRO/SHEATH,STRBLE,NON-PEEL: HCPCS | Performed by: INTERNAL MEDICINE

## 2024-02-22 PROCEDURE — 93623 PRGRMD STIMJ&PACG IV RX NFS: CPT | Performed by: INTERNAL MEDICINE

## 2024-02-22 PROCEDURE — 93662 INTRACARDIAC ECG (ICE): CPT | Performed by: INTERNAL MEDICINE

## 2024-02-22 PROCEDURE — 93612 INTRAVENTRICULAR PACING: CPT | Performed by: INTERNAL MEDICINE

## 2024-02-22 PROCEDURE — 86900 BLOOD TYPING SEROLOGIC ABO: CPT

## 2024-02-22 PROCEDURE — 93656 COMPRE EP EVAL ABLTJ ATR FIB: CPT | Performed by: INTERNAL MEDICINE

## 2024-02-22 PROCEDURE — C1894 INTRO/SHEATH, NON-LASER: HCPCS | Performed by: INTERNAL MEDICINE

## 2024-02-22 PROCEDURE — 36415 COLL VENOUS BLD VENIPUNCTURE: CPT

## 2024-02-22 PROCEDURE — 3700000001 HC ADD 15 MINUTES (ANESTHESIA): Performed by: INTERNAL MEDICINE

## 2024-02-22 PROCEDURE — 2500000003 HC RX 250 WO HCPCS: Performed by: NURSE ANESTHETIST, CERTIFIED REGISTERED

## 2024-02-22 PROCEDURE — C1732 CATH, EP, DIAG/ABL, 3D/VECT: HCPCS | Performed by: INTERNAL MEDICINE

## 2024-02-22 PROCEDURE — 85347 COAGULATION TIME ACTIVATED: CPT

## 2024-02-22 PROCEDURE — 2580000003 HC RX 258: Performed by: NURSE ANESTHETIST, CERTIFIED REGISTERED

## 2024-02-22 PROCEDURE — 93655 ICAR CATH ABLTJ DSCRT ARRHYT: CPT | Performed by: INTERNAL MEDICINE

## 2024-02-22 PROCEDURE — 2709999900 HC NON-CHARGEABLE SUPPLY: Performed by: INTERNAL MEDICINE

## 2024-02-22 PROCEDURE — C1733 CATH, EP, OTHR THAN COOL-TIP: HCPCS | Performed by: INTERNAL MEDICINE

## 2024-02-22 PROCEDURE — 86850 RBC ANTIBODY SCREEN: CPT

## 2024-02-22 PROCEDURE — 86901 BLOOD TYPING SEROLOGIC RH(D): CPT

## 2024-02-22 PROCEDURE — 93657 TX L/R ATRIAL FIB ADDL: CPT | Performed by: INTERNAL MEDICINE

## 2024-02-22 PROCEDURE — 2720000010 HC SURG SUPPLY STERILE: Performed by: INTERNAL MEDICINE

## 2024-02-22 PROCEDURE — 93613 INTRACARDIAC EPHYS 3D MAPG: CPT | Performed by: INTERNAL MEDICINE

## 2024-02-22 PROCEDURE — C1769 GUIDE WIRE: HCPCS | Performed by: INTERNAL MEDICINE

## 2024-02-22 PROCEDURE — 93622 COMP EP EVAL L VENTR PAC&REC: CPT | Performed by: INTERNAL MEDICINE

## 2024-02-22 PROCEDURE — C1759 CATH, INTRA ECHOCARDIOGRAPHY: HCPCS | Performed by: INTERNAL MEDICINE

## 2024-02-22 PROCEDURE — 3700000000 HC ANESTHESIA ATTENDED CARE: Performed by: INTERNAL MEDICINE

## 2024-02-22 RX ORDER — SODIUM CHLORIDE 0.9 % (FLUSH) 0.9 %
5-40 SYRINGE (ML) INJECTION PRN
Status: DISCONTINUED | OUTPATIENT
Start: 2024-02-22 | End: 2024-02-22 | Stop reason: HOSPADM

## 2024-02-22 RX ORDER — ROCURONIUM BROMIDE 10 MG/ML
INJECTION, SOLUTION INTRAVENOUS PRN
Status: DISCONTINUED | OUTPATIENT
Start: 2024-02-22 | End: 2024-02-22 | Stop reason: SDUPTHER

## 2024-02-22 RX ORDER — FENTANYL CITRATE 50 UG/ML
INJECTION, SOLUTION INTRAMUSCULAR; INTRAVENOUS PRN
Status: DISCONTINUED | OUTPATIENT
Start: 2024-02-22 | End: 2024-02-22 | Stop reason: SDUPTHER

## 2024-02-22 RX ORDER — HEPARIN SODIUM 1000 [USP'U]/ML
INJECTION, SOLUTION INTRAVENOUS; SUBCUTANEOUS PRN
Status: DISCONTINUED | OUTPATIENT
Start: 2024-02-22 | End: 2024-02-22 | Stop reason: SDUPTHER

## 2024-02-22 RX ORDER — ONDANSETRON 2 MG/ML
INJECTION INTRAMUSCULAR; INTRAVENOUS PRN
Status: DISCONTINUED | OUTPATIENT
Start: 2024-02-22 | End: 2024-02-22 | Stop reason: SDUPTHER

## 2024-02-22 RX ORDER — PROTAMINE SULFATE 10 MG/ML
INJECTION, SOLUTION INTRAVENOUS PRN
Status: DISCONTINUED | OUTPATIENT
Start: 2024-02-22 | End: 2024-02-22 | Stop reason: SDUPTHER

## 2024-02-22 RX ORDER — CARVEDILOL 6.25 MG/1
6.25 TABLET ORAL 2 TIMES DAILY
Qty: 30 TABLET | Refills: 2 | Status: SHIPPED | OUTPATIENT
Start: 2024-02-22

## 2024-02-22 RX ORDER — SODIUM CHLORIDE 9 MG/ML
INJECTION, SOLUTION INTRAVENOUS PRN
Status: DISCONTINUED | OUTPATIENT
Start: 2024-02-22 | End: 2024-02-22 | Stop reason: HOSPADM

## 2024-02-22 RX ORDER — PHENYLEPHRINE HCL IN 0.9% NACL 0.4MG/10ML
SYRINGE (ML) INTRAVENOUS PRN
Status: DISCONTINUED | OUTPATIENT
Start: 2024-02-22 | End: 2024-02-22 | Stop reason: SDUPTHER

## 2024-02-22 RX ORDER — SODIUM CHLORIDE 0.9 % (FLUSH) 0.9 %
5-40 SYRINGE (ML) INJECTION EVERY 12 HOURS SCHEDULED
Status: DISCONTINUED | OUTPATIENT
Start: 2024-02-22 | End: 2024-02-22 | Stop reason: HOSPADM

## 2024-02-22 RX ORDER — SUCCINYLCHOLINE/SOD CL,ISO/PF 200MG/10ML
SYRINGE (ML) INTRAVENOUS PRN
Status: DISCONTINUED | OUTPATIENT
Start: 2024-02-22 | End: 2024-02-22 | Stop reason: SDUPTHER

## 2024-02-22 RX ORDER — ACETAMINOPHEN 325 MG/1
650 TABLET ORAL EVERY 4 HOURS PRN
Status: DISCONTINUED | OUTPATIENT
Start: 2024-02-22 | End: 2024-02-22 | Stop reason: HOSPADM

## 2024-02-22 RX ORDER — PANTOPRAZOLE SODIUM 40 MG/1
40 TABLET, DELAYED RELEASE ORAL
Qty: 60 TABLET | Refills: 0 | Status: SHIPPED | OUTPATIENT
Start: 2024-02-22

## 2024-02-22 RX ORDER — DEXAMETHASONE SODIUM PHOSPHATE 4 MG/ML
INJECTION, SOLUTION INTRA-ARTICULAR; INTRALESIONAL; INTRAMUSCULAR; INTRAVENOUS; SOFT TISSUE PRN
Status: DISCONTINUED | OUTPATIENT
Start: 2024-02-22 | End: 2024-02-22 | Stop reason: SDUPTHER

## 2024-02-22 RX ADMIN — Medication 160 MG: at 10:03

## 2024-02-22 RX ADMIN — PROTAMINE SULFATE 50 MG: 10 INJECTION, SOLUTION INTRAVENOUS at 11:54

## 2024-02-22 RX ADMIN — DEXAMETHASONE SODIUM PHOSPHATE 4 MG: 4 INJECTION, SOLUTION INTRAMUSCULAR; INTRAVENOUS at 10:14

## 2024-02-22 RX ADMIN — PROPOFOL 200 MG: 10 INJECTION, EMULSION INTRAVENOUS at 10:03

## 2024-02-22 RX ADMIN — Medication 120 MCG: at 10:10

## 2024-02-22 RX ADMIN — SODIUM CHLORIDE: 9 INJECTION, SOLUTION INTRAVENOUS at 09:57

## 2024-02-22 RX ADMIN — PROPOFOL 50 MG: 10 INJECTION, EMULSION INTRAVENOUS at 10:55

## 2024-02-22 RX ADMIN — Medication 120 MCG: at 10:20

## 2024-02-22 RX ADMIN — FENTANYL CITRATE 100 MCG: 50 INJECTION, SOLUTION INTRAMUSCULAR; INTRAVENOUS at 10:03

## 2024-02-22 RX ADMIN — HEPARIN SODIUM 4000 UNITS: 1000 INJECTION, SOLUTION INTRAVENOUS; SUBCUTANEOUS at 10:53

## 2024-02-22 RX ADMIN — Medication 160 MCG: at 10:29

## 2024-02-22 RX ADMIN — HEPARIN SODIUM 14000 UNITS: 1000 INJECTION, SOLUTION INTRAVENOUS; SUBCUTANEOUS at 10:34

## 2024-02-22 RX ADMIN — ROCURONIUM BROMIDE 5 MG: 10 SOLUTION INTRAVENOUS at 10:03

## 2024-02-22 RX ADMIN — ISOPROTERENOL HYDROCHLORIDE 10 MCG/MIN: 0.2 INJECTION, SOLUTION INTRAMUSCULAR; INTRAVENOUS at 11:38

## 2024-02-22 RX ADMIN — SODIUM CHLORIDE: 9 INJECTION, SOLUTION INTRAVENOUS at 11:26

## 2024-02-22 RX ADMIN — PHENYLEPHRINE HYDROCHLORIDE 40 MCG/MIN: 10 INJECTION INTRAVENOUS at 10:34

## 2024-02-22 RX ADMIN — ONDANSETRON 4 MG: 2 INJECTION INTRAMUSCULAR; INTRAVENOUS at 11:54

## 2024-02-22 RX ADMIN — HEPARIN SODIUM 2000 UNITS: 1000 INJECTION, SOLUTION INTRAVENOUS; SUBCUTANEOUS at 11:15

## 2024-02-22 NOTE — PROGRESS NOTES
1324: Head of bed elevated 30degrees right and left groin site without bleeding and no hematoma. Family updated.  1350:  Discharge instructions explained to Carlos Domingo, all questions answered, and patient verbalized understanding.  Copy of discharge instructions given to patient.   1406: Carlos Domingo ambulated @ 1406 (time) approximately 20 feet.    Patient tolerated ambulation without adverse advents.      Right and left groin (right/left, groin/arm)  without bleeding or hematoma noted.      1426: Patient taken out via wheelchair Patient discharged to the care of Miguel Angel.

## 2024-02-22 NOTE — PROGRESS NOTES
Cardiac Cath Lab Procedure Area Arrival Note:    Carlos Domingo arrived to Cardiac Cath Lab, Procedure Area. Patient identifiers verified with NAME and DATE OF BIRTH. Procedure verified with patient. Consent forms verified. Allergies verified. Patient informed of procedure and plan of care. Questions answered with review. Patient voiced understanding of procedure and plan of care.    Patient on cardiac monitor, non-invasive blood pressure, SPO2 monitor. On stretcher to room 3 for EP study and a fib ablation under general anesthesia. Patient status doing well without problems. Patient is A&Ox 4. Patient reports no complaints.     Patient medicated during procedure with orders obtained and verified by Dr. Cervantes.    Refer to patients Cardiac Cath Lab PROCEDURE REPORT for vital signs, assessment, status, and response during procedure, printed at end of case. Printed report on chart or scanned into chart.

## 2024-02-22 NOTE — PROCEDURES
ATRIAL FIBRILLATION PULSED FIELD ABLATION  TYPICAL ATRIAL FLUTTER ABLATION  SUBSTRATE MODIFICATION ABLATION    Procedure Date: 02/22/24  Lab Physician: Valarie Cervantes MD    Indications:  37 yo gentleman with a history of HTN, CARLOS, symptomatic paroxysmal atrial fibrillation and atrial flutter (11.6% burden) on Amiodarone now referred for Afib/AFL ablation.     SHEATH INSERTION  All sheaths were placed using the modified Seldinger technique with ultrasound guided assistance  Right Femoral Vein:   Left Femoral Vein: 8Fr and a 11F sheath    CATHETER INSERTION  Catheters were advanced to the following positions using fluoroscopic guidance:  Coronary Sinus: Biosense decapolar catheter  LA: [employer] OctaRay Mapping Catheter  Ablation: AB Group PFA PulseSelect Loop Catheter; SMS GupShup ST/SF Ablation Catheter  ICE in RA/RV: SMS G[employer]up Intracardiac Ultrasound    MONITORING  General anesthesia with intubation and mechanical ventilation was provided by Anesthesiology. A  CIRCA temperature probe was placed in the esophagus posterior to the entire left atrial chamber and verified by fluoroscopy to detect temperature changes while delivering radiofrequency lesions.  An esophageal deviator (EsoSure) was used to deviate the esophagus when there was significant esophagus temperature change when needed. The patient was prepped and draped in the usual sterile fashion. Members of the general anesthesia staff and the EP nursing staff provided appropriate continuous electrocardiographic, hemodynamic, respiratory monitoring throughout the procedure.    PROCEDURE  After informed written consent, the patient was brought to the EP lab in the fasting, non-sedated state. The patient was prepped and draped in the usual sterile fashion. Femoral venous access was obtained using the modified Seldinger technique with a micropuncture needle. In the left femoral vein, 2 sheaths (8F short, 11F long) were inserted over guidewires. In the right femoral  entrance conduction into all 4 veins and lack of capture with pacing within the pulmonary veins at high output pacing.  4.  Program stimulation was performed with burst pacing down to AVWBCL ms from the proximal CS and down to AERP on and off of isoproterenol (up to 10 mcg/min) without any inducible arrhythmia.    SUBSTRATE MODIFICATION ABLATION  Additional substrate modification ablation was performed with 6 additional short ablation lines anchoring to the antral pulmonary vein ablation lesion sets. Three short linear ablation lines were created anchoring from the left pulmonary veins antral lesion set extending anterosuperiorly (1 o'clock), anterolaterally (3 o'clock), and inferiorly (6 o'clock) targeting non-pulmonary vein triggers from ganglionated plexi (GP) and transecting the Ligament of Abdirashid.  Three short linear ablation lines were created anchoring from the right pulmonary veins antral lesion set extending anterosuperiorly (11:30 o'clock), anteroseptal (9 o'clock), and inferiorly (6 o'clock) targeting non-PV triggers from ARGP and IRGP.      AH 87 ms   HV 40 ms   AV boris Wenckebach cycle length 540 ms   Atrial /210 ms   Av boris />350 ms   Left ventricular /<340 ms      ATRIAL FLUTTER/CAVOTRICUSPID ISTHMUS ABLATION  Atrial flutter was induced spontaneously. The TCL was 210  ms. Therefore, the decision was to proceed with a CTI ablation. Using a "[employer] ST/SF ablation catheter, RF was delivered in the cavotricuspid isthmus, creating a line at approximately 6:30 o'clock on the tricuspid annulus from the annulus to the IVC lip. Following completion of the ablation line, the final medial to lateral transisthmus time was 148  ms and the lateral to medial transisthmus time was 144  ms. Bidirectional block was verified with differential pacing maneuvers. With pacing from the proximal CS and the ablation catheter located more laterally, the medial to lateral time (prox CS to ablation

## 2024-02-22 NOTE — ANESTHESIA PRE PROCEDURE
History:        Diagnosis Date   • Atrial fibrillation (HCC)    • Essential hypertension    • Hyperlipidemia        Past Surgical History:  No past surgical history on file.    Social History:    Social History     Tobacco Use   • Smoking status: Never   • Smokeless tobacco: Never   Substance Use Topics   • Alcohol use: Yes     Alcohol/week: 10.0 standard drinks of alcohol     Types: 10 Cans of beer per week                                Counseling given: Not Answered      Vital Signs (Current):   Vitals:    02/22/24 0805   BP: 136/76   Pulse: 75   Resp: 18   Temp: 98.4 °F (36.9 °C)   TempSrc: Oral   SpO2: 99%   Weight: 88.5 kg (195 lb)   Height: 1.829 m (6')                                              BP Readings from Last 3 Encounters:   02/22/24 136/76   11/07/23 (!) 164/98   10/03/23 119/83       NPO Status: Time of last liquid consumption: 0645 (sips of water with meds)                        Time of last solid consumption: 2100                        Date of last liquid consumption: 02/22/24                        Date of last solid food consumption: 02/21/24    BMI:   Wt Readings from Last 3 Encounters:   02/22/24 88.5 kg (195 lb)   11/07/23 92.1 kg (203 lb)   10/03/23 91.4 kg (201 lb 6.4 oz)     Body mass index is 26.45 kg/m².    CBC:   Lab Results   Component Value Date/Time    WBC 4.0 02/07/2024 09:57 AM    RBC 4.39 02/07/2024 09:57 AM    HGB 12.6 02/07/2024 09:57 AM    HCT 38.7 02/07/2024 09:57 AM    MCV 88 02/07/2024 09:57 AM    RDW 12.7 02/07/2024 09:57 AM     02/07/2024 09:57 AM       CMP:   Lab Results   Component Value Date/Time     02/07/2024 09:57 AM    K 5.2 02/07/2024 09:57 AM     02/07/2024 09:57 AM    CO2 25 02/07/2024 09:57 AM    BUN 17 02/07/2024 09:57 AM    CREATININE 1.38 02/07/2024 09:57 AM    GFRAA 106 11/04/2021 07:11 AM    AGRATIO 1.3 12/09/2022 09:38 AM    LABGLOM 66 02/07/2024 09:57 AM    GLUCOSE 97 02/07/2024 09:57 AM    PROT 7.5 12/09/2022 09:38 AM    CALCIUM

## 2024-02-22 NOTE — PROGRESS NOTES
1225:  TRANSFER - IN REPORT:    Verbal report received from Esteban on Carlos Domingo , from the Cardiac Cath lab, for routine progression of care. Report consisted of patient’s Situation, Background, Assessment and Recommendations(SBAR). Opportunity for questions and clarification was provided. Assessment completed upon patient’s arrival to Cardiac Cath Lab RECOVERY AREA and care assumed.    Cardiac Cath Lab Recovery Arrival Note:     Carlos Domingo arrived to [facility] recovery area.  Patient procedure= Afib and Aflutter and ablation. Patient on cardiac monitor, non-invasive blood pressure, Patient status doing well without problems. Patient is A&Ox 4. Patient reports no complaints. Procedure site without any bleeding and no hematoma.

## 2024-02-22 NOTE — PROGRESS NOTES
Cardiac Cath Lab Recovery Arrival Note:      Carlos Domingo arrived to Cardiac Cath Lab, Recovery Area. Staff introduced to patient. Patient identifiers verified with NAME and DATE OF BIRTH. Procedure verified with patient. Consent forms reviewed and signed by patient or authorized representative and verified. Allergies verified. Patient informed of procedure and plan of care. Questions answered with review. Patient prepped for procedure, per orders from physician, prior to arrival.    Patient on cardiac monitor, non-invasive blood pressure, SPO2 monitor. Patient is A&Ox 4. Patient reports no complaints.     Patient in stretcher, in low position, with side rails up, call bell within reach, patient instructed to call of assistance as needed.    Patient prep in: [facility] Recovery Area, Bed# FT.   Family in: waiting room.   Prep by: VENKATESH Lizarraga

## 2024-02-22 NOTE — ANESTHESIA POSTPROCEDURE EVALUATION
Department of Anesthesiology  Postprocedure Note    Patient: Carlos Domingo  MRN: [id number]  YOB: 1985  Date of evaluation: 2/22/2024    Procedure Summary       Date: 02/22/24 Room / Location: [facility] CATH LAB 3 / [facility] CARDIAC CATH LAB    Anesthesia Start: 0957 Anesthesia Stop: 1226    Procedures:       Ablation A-fib w complete ep study      Intracardiac echocardiogram      Ultrasound guided vascular access      Ep 3d mapping      Drug stimulation Diagnosis:       Atrial fibrillation, unspecified type (HCC)      (Atrial fibrillation, unspecified type (HCC) [I48.91])    Providers: Valarie Cervantes MD Responsible Provider: Reji Carter MD    Anesthesia Type: General ASA Status: 2            Anesthesia Type: General    Dennis Phase I: Dennis Score: 10    Dennis Phase II:      Anesthesia Post Evaluation  Post-Anesthesia Evaluation and Assessment    Patient: Carlos Domingo MRN: [id number]  SSN: [id number]    YOB: 1985  Age: 38 y.o.  Sex: male      I have evaluated the patient and they are stable and ready for discharge from the PACU.     Cardiovascular Function/Vital Signs  Visit Vitals  /81   Pulse 65   Temp 98.1 °F (36.7 °C) (Oral)   Resp 14   Ht 1.829 m (6')   Wt 88.5 kg (195 lb)   SpO2 97%   BMI 26.45 kg/m²       Patient is status post General anesthesia for Procedure(s):  Ablation A-fib w complete ep study  Intracardiac echocardiogram  Ultrasound guided vascular access  Ep 3d mapping  Drug stimulation.    Nausea/Vomiting: None    Postoperative hydration reviewed and adequate.    Pain:      Managed    Neurological Status:       At baseline    Mental Status, Level of Consciousness: Alert and  oriented to person, place, and time    Pulmonary Status:       Adequate oxygenation and airway patent    Complications related to anesthesia: None    Post-anesthesia assessment completed. No concerns    Signed By: Reji Carter MD     February 22, 2024                There were no known notable events

## 2024-02-22 NOTE — DISCHARGE INSTRUCTIONS
Atrial Fibrillation Ablation   Discharge Instructions      You have just had an Atrial Fibrillation Ablation. There were catheters temporarily placed in your heart through a puncture in the veins and/or arteries in your groin.        WHAT TO EXPECT     If you have had an Atrial Fibrillation Ablation please be aware that you may experience mild chest pain that will resolve within 24-48 hours.  If the chest pain persists or becomes severe, please call the office.    Mild to moderate, non-painful, bruising or mild swelling at the puncture site is not uncommon; it should resolve in 7 - 14 days, and may extend down your thigh as it heals. Application of ice to the site may help with any tenderness.    You have a small gauze dressing applied to the puncture site in your groin.  You may remove this the following morning.     It is not uncommon to feel palpitations during the healing phase after your ablation. If you feel as though you are having recurrence of atrial fibrillation lasting longer than 30 minutes, please contact the office.    Palpitations/AFIB can occur during the healing phase (1-2 months) post ablation.      MEDICATIONS     Start Pantoprazole 40 mg twice daily x 30 days post ablation.  A prescription has been sent electronically to your pharmacy on record.  Please pick it up & take as directed.  Decrease Carvedilol to 6.25 mg   Continue other medications as previously prescribed.      ACTIVITY     A responsible adult must take you home.  Do not drive a car for 24 hours.    Rest quietly for the remainder of the day.  Do not lift anything greater than 10 pounds for 7 days.  Limit bending at the puncture site and minimize use of stairs for at least 2 days.  You may remove the bandage and shower the morning after the procedure.  Do not take a bath for 3 days.        SYMPTOMS THAT NEED TO BE REPORTED IMMEDIATELY     Bleeding at the puncture site.  If there is bleeding, lie down and hold firm direct pressure for

## 2024-02-22 NOTE — H&P
fibrillation (HCC)     Essential hypertension     Hyperlipidemia      No past surgical history on file.  No family history on file.  Social History     Tobacco Use    Smoking status: Never    Smokeless tobacco: Never   Substance Use Topics    Alcohol use: Yes     Alcohol/week: 10.0 standard drinks of alcohol     Types: 10 Cans of beer per week        Review of Systems:   12 point review of systems was performed. All negative except for HPI     Objective:   BP (!) 164/98 (Site: Left Upper Arm, Position: Sitting)   Pulse 77   Resp 18   Ht 1.829 m (6')   Wt 92.1 kg (203 lb)   SpO2 98%   BMI 27.53 kg/m²       Physical Exam:   General:  Alert and oriented, in no acute distress  Head:  Atraumatic, normocephalic  Eyes:  extraocular muscles intact  Neck:  Supple, normal range of motion  Lungs:  Clear to auscultation bilaterally, no wheezes/rales/rhonchi   Cardiovascular: Irregularly irregular, variable S1-S2, no murmurs/rubs/gallops  Abdomen:  Soft, nontender, nondistended, normoactive bowel sounds  Skin:  Intact, no rash  Extremities:, no clubbing, cyanosis, or edema  Musculoskeletal: normal range of motion  Neurological:  Alert and oriented, no focal neurologic deficits  Psychiatric:  Normal mood and affect    No results found for: \"HBA1C\", \"PEI3VZPI\"  EKG: atrial flutter at 89 bpm, LBBB,  ms    No results found for this or any previous visit.    No results found for this or any previous visit.    No results found for this or any previous visit.      No valid procedures specified.  No valid procedures specified.  No valid procedures specified.  [unfilled]          Thank you for involving me in this patient's care and please call with further concerns or questions.      ________________________________________  An Cristino Cervantes MD, FACC, RS  Cardiac Electrophysiology  [facility] [place]  [address] [place] [address], [facility] [address]                         [place] [address]                              [phone]     [address] [facility]  [place] [address] [phone]

## 2024-02-22 NOTE — PROGRESS NOTES
EP LAB to Recovery Room Report    Procedure: A-Fib Ablation and Flutter ablation    MD: JEFFREY Cervantes MD  Pre-procedure heart rhythm: Normal Sinus  Rhythm   Verbal Report given to Recovery Nurse on patient being transferred to Recovery Room for routine post-op. Patient stable upon transfer to .  Pt had general sedation with Anesthesia team, who managed MAR, vitals, and airway. Vitals, mental status, MAR, procedural summary discussed with recovery RN.       Post-procedure heart rhythm: Normal Sinus  Rhythm      Sheaths:    Right femoral vein 14 fr sheath removed at 1158 am, closed with perclose.    Left femoral vein 11 fr sheath removed at 1201 pm, closed with perclose.  Left femoral vein 8 fr sheath removed at 1204 pm, closed with perclose.

## 2024-02-26 LAB
ACT BLD: 287 SECS (ref 79–138)
ACT BLD: 315 SECS (ref 79–138)
ACT BLD: 325 SECS (ref 79–138)

## 2024-03-18 RX ORDER — VALSARTAN 160 MG/1
160 TABLET ORAL DAILY
Qty: 90 TABLET | Refills: 1 | Status: SHIPPED | OUTPATIENT
Start: 2024-03-18

## 2024-03-18 NOTE — TELEPHONE ENCOUNTER
Requested Prescriptions     Signed Prescriptions Disp Refills    valsartan (DIOVAN) 160 MG tablet 90 tablet 1     Sig: TAKE 1 TABLET BY MOUTH EVERY DAY     Authorizing Provider: VALARIE CERVANTES     Ordering User: GENE SANCHEZ     Refill per verbal order Dr. Valarie Cervantes.   Last visit:11/7/23  Next visit: 3/26/24

## 2024-03-21 ENCOUNTER — TELEPHONE (OUTPATIENT)
Age: 39
End: 2024-03-21

## 2024-03-21 NOTE — TELEPHONE ENCOUNTER
Attempted to reach patient by telephone. HIPAA compliant message was left for return call.     Future Appointments   Date Time Provider Department Center   3/26/2024  8:40 AM Valarie Cervantes MD [facility]

## 2024-03-26 ENCOUNTER — OFFICE VISIT (OUTPATIENT)
Age: 39
End: 2024-03-26
Payer: COMMERCIAL

## 2024-03-26 VITALS
HEIGHT: 72 IN | HEART RATE: 71 BPM | DIASTOLIC BLOOD PRESSURE: 72 MMHG | BODY MASS INDEX: 26.76 KG/M2 | SYSTOLIC BLOOD PRESSURE: 120 MMHG | WEIGHT: 197.58 LBS | OXYGEN SATURATION: 98 %

## 2024-03-26 DIAGNOSIS — I48.0 PAF (PAROXYSMAL ATRIAL FIBRILLATION) (HCC): ICD-10-CM

## 2024-03-26 DIAGNOSIS — I10 PRIMARY HYPERTENSION: ICD-10-CM

## 2024-03-26 DIAGNOSIS — Z79.899 HIGH RISK MEDICATION USE: ICD-10-CM

## 2024-03-26 DIAGNOSIS — Z51.81 ANTICOAGULATION MANAGEMENT ENCOUNTER: ICD-10-CM

## 2024-03-26 DIAGNOSIS — R00.2 PALPITATIONS: ICD-10-CM

## 2024-03-26 DIAGNOSIS — Z79.01 ANTICOAGULATION MANAGEMENT ENCOUNTER: ICD-10-CM

## 2024-03-26 DIAGNOSIS — I48.91 ATRIAL FIBRILLATION, UNSPECIFIED TYPE (HCC): Primary | ICD-10-CM

## 2024-03-26 PROCEDURE — 3078F DIAST BP <80 MM HG: CPT | Performed by: INTERNAL MEDICINE

## 2024-03-26 PROCEDURE — 3074F SYST BP LT 130 MM HG: CPT | Performed by: INTERNAL MEDICINE

## 2024-03-26 PROCEDURE — 99214 OFFICE O/P EST MOD 30 MIN: CPT | Performed by: INTERNAL MEDICINE

## 2024-03-26 PROCEDURE — 93000 ELECTROCARDIOGRAM COMPLETE: CPT | Performed by: INTERNAL MEDICINE

## 2024-03-26 RX ORDER — ASPIRIN 81 MG/1
81 TABLET ORAL DAILY
Qty: 90 TABLET | Refills: 1 | Status: SHIPPED | OUTPATIENT
Start: 2024-05-23

## 2024-03-26 ASSESSMENT — PATIENT HEALTH QUESTIONNAIRE - PHQ9
SUM OF ALL RESPONSES TO PHQ QUESTIONS 1-9: 0
2. FEELING DOWN, DEPRESSED OR HOPELESS: NOT AT ALL
SUM OF ALL RESPONSES TO PHQ9 QUESTIONS 1 & 2: 0
SUM OF ALL RESPONSES TO PHQ QUESTIONS 1-9: 0
SUM OF ALL RESPONSES TO PHQ QUESTIONS 1-9: 0
1. LITTLE INTEREST OR PLEASURE IN DOING THINGS: NOT AT ALL
SUM OF ALL RESPONSES TO PHQ QUESTIONS 1-9: 0

## 2024-03-26 NOTE — PROGRESS NOTES
Outpatient Medications   Medication Sig Dispense Refill    rivaroxaban (XARELTO) 20 MG TABS tablet Take 1 tablet by mouth daily (with breakfast) 90 tablet 3    [START ON 5/23/2024] aspirin 81 MG EC tablet Take 1 tablet by mouth daily 90 tablet 1    valsartan (DIOVAN) 160 MG tablet TAKE 1 TABLET BY MOUTH EVERY DAY 90 tablet 1    carvedilol (COREG) 6.25 MG tablet Take 1 tablet by mouth 2 times daily 30 tablet 2    amLODIPine (NORVASC) 5 MG tablet TAKE 1 TABLET BY MOUTH DAILY 90 tablet 2    vitamin D 25 MCG (1000 UT) CAPS Take by mouth daily       No current facility-administered medications for this visit.     Allergies   Allergen Reactions    Lisinopril Cough    Penicillins      Other reaction(s): Unknown (comments)     Past Medical History:   Diagnosis Date    Atrial fibrillation (HCC)     Essential hypertension     Hyperlipidemia      Past Surgical History:   Procedure Laterality Date    CARDIAC PROCEDURE N/A 2/22/2024    Intracardiac echocardiogram performed by Valarie Cervantes MD at [facility] CARDIAC CATH LAB    EP DEVICE PROCEDURE N/A 2/22/2024    Ablation A-fib w complete ep study performed by Valarie Cervantes MD at [facility] CARDIAC CATH LAB    EP DEVICE PROCEDURE N/A 2/22/2024    Ep 3d mapping performed by Valarie Cervantes MD at [facility] CARDIAC CATH LAB    EP DEVICE PROCEDURE N/A 2/22/2024    Drug stimulation performed by Valarie Cervantes MD at [facility] CARDIAC CATH LAB    INVASIVE VASCULAR N/A 2/22/2024    Ultrasound guided vascular access performed by Valarie Cervantes MD at [facility] CARDIAC CATH LAB     History reviewed. No pertinent family history.  Social History     Tobacco Use    Smoking status: Never    Smokeless tobacco: Never   Substance Use Topics    Alcohol use: Yes     Alcohol/week: 10.0 standard drinks of alcohol     Types: 10 Cans of beer per week        Review of Systems:   12 point review of systems was performed. All negative except for HPI     Objective:   /72 (Site: Left Upper Arm, Position: Sitting, Cuff Size: Medium Adult)   Pulse 71

## 2024-03-26 NOTE — PATIENT INSTRUCTIONS
Stop Amiodarone today  Stop Protonix  Stop Xarelto on 5/22/24, start Aspirin 81 mg daily    [facility]  Goal weight loss of 10%. 15-20 lbs    Goal blood pressure less than 130/90 mmHg    FU with NP in 3 months  FU with Dr. Cervantes in 1 year

## 2024-04-30 ENCOUNTER — TELEPHONE (OUTPATIENT)
Age: 39
End: 2024-04-30

## 2024-04-30 NOTE — TELEPHONE ENCOUNTER
Left message for patient to reschedule appointment on 06/24/2024 with Moni Juárez NP. Provider will not be in the office.     Sent letter via my chart    js

## 2024-06-17 NOTE — PROGRESS NOTES
[facility]  Cardiac Electrophysiology Clinic Care Note                  []Initial visit     [x]Established visit     Patient Name: Carlos Domingo - :1985 - MRN:[id number]  Primary Cardiologist: Mandy Lowery NP  Electrophysiologist: Valarie Cervantes MD     Reason for visit: PAF/persistent AFL follow up    HPI:  Mr. Domingo is a 38 y.o. male who presents for follow up, is s/p pulsed field ablation (PVI, GP, Ligament of Abdirashid) & confirmation of CTI block (2024-[facility]).    Stopped amiodarone 1 month post ablation.  He denies any recurrent palpitations or known recurrent arrhythmia.  He does mention that he's getting over a respiratory infection, has required an inhaler.    ECG today shows  bpm with LBBB pattern, left axis deviation.    Echo in 2023 showed LVEF 55-60% with trace TR.    Stopped Xarelto 3 months post ablation, denies bleeding issues on current ASA 81 mg po daily.      Previous:  S/p pulsed field ablation (PVI, GP, Ligament of Abdirashid) & confirmation of CTI block (2024-[facility]).    Discontinued flecainide due to LBBB, still present after discontinuation.    Atrial flutter had left bundle aberrancy, likely due to flecainide.    CARLOS, uses dental appliance.       Assessment and Plan       ICD-10-CM    1. PAF (paroxysmal atrial fibrillation) (HCC)  I48.0 EKG 12 Lead     Extended cardiac holter monitor (3 days-14 day)      2. Typical atrial flutter (HCC)  I48.3 EKG 12 Lead     Extended cardiac holter monitor (3 days-14 day)      3. Primary hypertension  I10 EKG 12 Lead     Extended cardiac holter monitor (3 days-14 day)      4. CARLOS (obstructive sleep apnea)  G47.33 EKG 12 Lead     Extended cardiac holter monitor (3 days-14 day)      5. S/P ablation of atrial fibrillation  Z98.890 EKG 12 Lead    Z86.79 Extended cardiac holter monitor (3 days-14 day)      6. S/P ablation of atrial flutter  Z98.890 EKG 12 Lead    Z86.79 Extended cardiac holter monitor (3

## 2024-06-18 ENCOUNTER — OFFICE VISIT (OUTPATIENT)
Age: 39
End: 2024-06-18

## 2024-06-18 VITALS
HEIGHT: 71 IN | HEART RATE: 78 BPM | TEMPERATURE: 98 F | BODY MASS INDEX: 27.3 KG/M2 | DIASTOLIC BLOOD PRESSURE: 84 MMHG | RESPIRATION RATE: 18 BRPM | SYSTOLIC BLOOD PRESSURE: 119 MMHG | OXYGEN SATURATION: 98 % | WEIGHT: 195 LBS

## 2024-06-18 DIAGNOSIS — H00.11 CHALAZION OF RIGHT UPPER EYELID: Primary | ICD-10-CM

## 2024-06-18 PROBLEM — I10 ACCELERATED HYPERTENSION: Status: ACTIVE | Noted: 2017-07-10

## 2024-06-18 RX ORDER — ERYTHROMYCIN 5 MG/G
OINTMENT OPHTHALMIC EVERY 6 HOURS
Qty: 1 G | Refills: 0 | Status: SHIPPED | OUTPATIENT
Start: 2024-06-18 | End: 2024-06-25

## 2024-06-18 ASSESSMENT — VISUAL ACUITY
OD_CC: 20/30
OU: 1
OS_CC: 20/25

## 2024-06-18 NOTE — PROGRESS NOTES
Not on file    Years of education: Not on file    Highest education level: Not on file   Occupational History    Not on file   Tobacco Use    Smoking status: Never    Smokeless tobacco: Never   Substance and Sexual Activity    Alcohol use: Yes     Alcohol/week: 10.0 standard drinks of alcohol     Types: 10 Cans of beer per week    Drug use: No    Sexual activity: Not on file   Other Topics Concern    Not on file   Social History Narrative    Not on file     Social Determinants of Health     Financial Resource Strain: Not on file   Food Insecurity: Not on file   Transportation Needs: Not on file   Physical Activity: Not on file   Stress: Not on file   Social Connections: Not on file   Intimate Partner Violence: Not on file   Housing Stability: Not on file     Past Medical History:   Diagnosis Date    Atrial fibrillation (HCC)     Essential hypertension     Hyperlipidemia      Past Surgical History:   Procedure Laterality Date    CARDIAC PROCEDURE N/A 2/22/2024    Intracardiac echocardiogram performed by Valarie Cervantes MD at [facility] CARDIAC CATH LAB    EP DEVICE PROCEDURE N/A 2/22/2024    Ablation A-fib w complete ep study performed by Valarie Cervantes MD at [facility] CARDIAC CATH LAB    EP DEVICE PROCEDURE N/A 2/22/2024    Ep 3d mapping performed by Valarie Cervantes MD at [facility] CARDIAC CATH LAB    EP DEVICE PROCEDURE N/A 2/22/2024    Drug stimulation performed by Valarie Cervantes MD at [facility] CARDIAC CATH LAB    INVASIVE VASCULAR N/A 2/22/2024    Ultrasound guided vascular access performed by Valarie Cervantes MD at [facility] CARDIAC CATH LAB     Prior to Admission medications    Medication Sig Start Date End Date Taking? Authorizing Provider   rivaroxaban (XARELTO) 20 MG TABS tablet Take 1 tablet by mouth   Yes Provider, MD Daphne   erythromycin (ROMYCIN) 5 MG/GM ophthalmic ointment Place into the right eye every 6 hours for 7 days 6/18/24 6/25/24 Yes Cristino Back APRN - CNP   valsartan (DIOVAN) 160 MG tablet TAKE 1 TABLET BY MOUTH EVERY DAY 3/18/24  Yes

## 2024-06-28 ENCOUNTER — OFFICE VISIT (OUTPATIENT)
Age: 39
End: 2024-06-28

## 2024-06-28 VITALS
HEART RATE: 89 BPM | HEIGHT: 71 IN | DIASTOLIC BLOOD PRESSURE: 74 MMHG | WEIGHT: 193.4 LBS | SYSTOLIC BLOOD PRESSURE: 98 MMHG | BODY MASS INDEX: 27.07 KG/M2 | OXYGEN SATURATION: 98 %

## 2024-06-28 DIAGNOSIS — Z98.890 S/P ABLATION OF ATRIAL FLUTTER: ICD-10-CM

## 2024-06-28 DIAGNOSIS — I48.0 PAF (PAROXYSMAL ATRIAL FIBRILLATION) (HCC): Primary | ICD-10-CM

## 2024-06-28 DIAGNOSIS — Z86.79 S/P ABLATION OF ATRIAL FLUTTER: ICD-10-CM

## 2024-06-28 DIAGNOSIS — Z86.79 S/P ABLATION OF ATRIAL FIBRILLATION: ICD-10-CM

## 2024-06-28 DIAGNOSIS — I48.3 TYPICAL ATRIAL FLUTTER (HCC): ICD-10-CM

## 2024-06-28 DIAGNOSIS — G47.33 OSA (OBSTRUCTIVE SLEEP APNEA): ICD-10-CM

## 2024-06-28 DIAGNOSIS — I10 PRIMARY HYPERTENSION: ICD-10-CM

## 2024-06-28 DIAGNOSIS — Z98.890 S/P ABLATION OF ATRIAL FIBRILLATION: ICD-10-CM

## 2024-06-28 RX ORDER — AMLODIPINE BESYLATE 5 MG/1
2.5 TABLET ORAL DAILY
Qty: 90 TABLET | Refills: 0
Start: 2024-06-28

## 2024-06-28 RX ORDER — CARVEDILOL 12.5 MG/1
12.5 TABLET ORAL 2 TIMES DAILY
Qty: 60 TABLET | Refills: 3 | Status: SHIPPED | OUTPATIENT
Start: 2024-06-28

## 2024-06-28 RX ORDER — LEVALBUTEROL TARTRATE 45 UG/1
1-2 AEROSOL, METERED ORAL EVERY 6 HOURS PRN
COMMUNITY
Start: 2024-06-21

## 2024-06-28 ASSESSMENT — PATIENT HEALTH QUESTIONNAIRE - PHQ9
SUM OF ALL RESPONSES TO PHQ QUESTIONS 1-9: 0
1. LITTLE INTEREST OR PLEASURE IN DOING THINGS: NOT AT ALL
SUM OF ALL RESPONSES TO PHQ9 QUESTIONS 1 & 2: 0
SUM OF ALL RESPONSES TO PHQ QUESTIONS 1-9: 0
2. FEELING DOWN, DEPRESSED OR HOPELESS: NOT AT ALL

## 2024-07-01 ENCOUNTER — PATIENT MESSAGE (OUTPATIENT)
Age: 39
End: 2024-07-01

## 2024-07-01 RX ORDER — AMLODIPINE BESYLATE 2.5 MG/1
2.5 TABLET ORAL DAILY
Qty: 90 TABLET | Refills: 1 | Status: SHIPPED | OUTPATIENT
Start: 2024-07-01

## 2024-07-01 NOTE — TELEPHONE ENCOUNTER
Med refilled per VO by MD.    Future Appointments   Date Time Provider Department Center   10/8/2024  9:00 AM Valarie Cervantes MD [facility]

## 2024-07-10 ENCOUNTER — HOSPITAL ENCOUNTER (OUTPATIENT)
Facility: HOSPITAL | Age: 39
Discharge: HOME | End: 2024-07-13
Payer: COMMERCIAL

## 2024-07-10 ENCOUNTER — TRANSCRIBE ORDERS (OUTPATIENT)
Facility: HOSPITAL | Age: 39
End: 2024-07-10

## 2024-07-10 DIAGNOSIS — J40 COMPLICATED BRONCHITIS: Primary | ICD-10-CM

## 2024-07-10 DIAGNOSIS — J40 COMPLICATED BRONCHITIS: ICD-10-CM

## 2024-07-10 PROCEDURE — 71046 X-RAY EXAM CHEST 2 VIEWS: CPT

## 2024-07-17 ENCOUNTER — TELEPHONE (OUTPATIENT)
Age: 39
End: 2024-07-17

## 2024-07-17 NOTE — TELEPHONE ENCOUNTER
Called patient, ID verified using two patient identifiers.  Discussed the recommended cardioversion.    Mr. Domingo expressed concern because he thought the ablation he had was supposed to take care of his afib.  Discussed that the ablation is used as a treatment and not necessarily a cure.  Some patient's can still have blips of arrhythmia and there is no way to know who will have reoccurrence.     He is not interested in scheduling the cardioversion at this time.  Discussed that the longer he is in afib the more difficult it can be to manage.    He would like to know if he can wait and see what happens over the next few months before he decides.  He asked what will happen to him if he does nothing.    Advised him that I will send a message to Dr. Cervantes to see if he can call him to discuss.

## 2024-08-27 RX ORDER — VALSARTAN 160 MG/1
160 TABLET ORAL DAILY
Qty: 90 TABLET | Refills: 1 | Status: SHIPPED | OUTPATIENT
Start: 2024-08-27

## 2024-08-27 NOTE — TELEPHONE ENCOUNTER
Requested Prescriptions     Signed Prescriptions Disp Refills    valsartan (DIOVAN) 160 MG tablet 90 tablet 1     Sig: TAKE 1 TABLET BY MOUTH EVERY DAY     Authorizing Provider: VALARIE CERVANTES     Ordering User: GENE SANCHEZ     Refill per verbal order Dr. Valarie Cervantes.   Last visit:6/28/24  Next visit: 10/8/24

## 2024-10-08 ENCOUNTER — OFFICE VISIT (OUTPATIENT)
Age: 39
End: 2024-10-08
Payer: COMMERCIAL

## 2024-10-08 VITALS
SYSTOLIC BLOOD PRESSURE: 140 MMHG | HEIGHT: 71 IN | DIASTOLIC BLOOD PRESSURE: 88 MMHG | OXYGEN SATURATION: 98 % | BODY MASS INDEX: 28.11 KG/M2 | HEART RATE: 81 BPM | WEIGHT: 200.8 LBS

## 2024-10-08 DIAGNOSIS — Z51.81 ANTICOAGULATION MANAGEMENT ENCOUNTER: ICD-10-CM

## 2024-10-08 DIAGNOSIS — I10 PRIMARY HYPERTENSION: ICD-10-CM

## 2024-10-08 DIAGNOSIS — Z98.890 S/P ABLATION OF ATRIAL FIBRILLATION: ICD-10-CM

## 2024-10-08 DIAGNOSIS — I48.0 PAF (PAROXYSMAL ATRIAL FIBRILLATION) (HCC): Primary | ICD-10-CM

## 2024-10-08 DIAGNOSIS — Z86.79 S/P ABLATION OF ATRIAL FIBRILLATION: ICD-10-CM

## 2024-10-08 DIAGNOSIS — R00.2 PALPITATIONS: ICD-10-CM

## 2024-10-08 DIAGNOSIS — I48.3 TYPICAL ATRIAL FLUTTER (HCC): ICD-10-CM

## 2024-10-08 DIAGNOSIS — Z86.79 S/P ABLATION OF ATRIAL FLUTTER: ICD-10-CM

## 2024-10-08 DIAGNOSIS — Z79.01 ANTICOAGULATION MANAGEMENT ENCOUNTER: ICD-10-CM

## 2024-10-08 DIAGNOSIS — Z98.890 S/P ABLATION OF ATRIAL FLUTTER: ICD-10-CM

## 2024-10-08 PROCEDURE — 3079F DIAST BP 80-89 MM HG: CPT | Performed by: INTERNAL MEDICINE

## 2024-10-08 PROCEDURE — 93000 ELECTROCARDIOGRAM COMPLETE: CPT | Performed by: INTERNAL MEDICINE

## 2024-10-08 PROCEDURE — 3077F SYST BP >= 140 MM HG: CPT | Performed by: INTERNAL MEDICINE

## 2024-10-08 PROCEDURE — 99214 OFFICE O/P EST MOD 30 MIN: CPT | Performed by: INTERNAL MEDICINE

## 2024-10-08 RX ORDER — PROPAFENONE HYDROCHLORIDE 150 MG/1
150 TABLET, COATED ORAL EVERY 8 HOURS
COMMUNITY
Start: 2024-10-02

## 2024-10-08 ASSESSMENT — PATIENT HEALTH QUESTIONNAIRE - PHQ9
SUM OF ALL RESPONSES TO PHQ QUESTIONS 1-9: 0
SUM OF ALL RESPONSES TO PHQ QUESTIONS 1-9: 0
SUM OF ALL RESPONSES TO PHQ9 QUESTIONS 1 & 2: 0
SUM OF ALL RESPONSES TO PHQ QUESTIONS 1-9: 0
1. LITTLE INTEREST OR PLEASURE IN DOING THINGS: NOT AT ALL
SUM OF ALL RESPONSES TO PHQ QUESTIONS 1-9: 0

## 2024-10-08 NOTE — PATIENT INSTRUCTIONS
Hold Propafenone 1 week  Start Multaq 400 mg twice a day after 1 week of being off of propafenone      Schedule for atrial fibrillation ablation next available  Please hold anticoagulation on the AM of the procedure  Obtain blood work prior to the procedure    For more information regarding atrial fibrillation management, please visit:  [email or web address]    Your Atrial Fibrillation Ablation procedure has been scheduled for Thursday January 23, 2025 at 2:30pm, at [facility].    Please report to Admitting Department by 12:30pm, or 2 hours prior to your scheduled procedure. Please bring a list of your current medications and medication bottles, if able, to the hospital on this day.  You will be unable to drive after your procedure so please make sure to bring someone with you to your procedure.    You will also need to see Dr. Cervantes's Nurse Practitioner in office prior to your procedure. An appointment has been scheduled for 01/13/2025 at 9:40am. Your labs will be ordered for you to have drawn that day.    You will need to have nothing to eat or drink after midnight, the night prior to your procedure. You may have small sips of water, if needed, to take with your medication.    You should hold your medication, Xarelto, the morning of your procedure.     After your procedure, you will need to follow up with Dr Cervatnes. Your follow-up appointment has been scheduled for 02/05/2025 at 9:00am.

## 2024-10-08 NOTE — PROGRESS NOTES
Cardiac Electrophysiology OFFICE Follow-up Note       Assessment/Plan:   1. PAF (paroxysmal atrial fibrillation) (HCC)  -     EKG 12 Lead  2. Typical atrial flutter (HCC)  -     EKG 12 Lead  3. Primary hypertension  -     EKG 12 Lead  4. S/P ablation of atrial fibrillation  -     EKG 12 Lead  5. S/P ablation of atrial flutter  -     EKG 12 Lead  6. Palpitations  -     EKG 12 Lead  7. Anticoagulation management encounter       Primary Cardiologist: Sebastián          Paroxysmal atrial fibrillation/persistent atrial flutter:  -Atrial flutter had left bundle aberrancy.  -Now s/p pulsed field ablation (PVI, GP, Ligament of Abdirashid) & confirmation of CTI block (02/22/2024-Cervantes).  -Stopped amiodarone 1 month post ablation.  -Recurrent AFL with RVR noted via ECG on 06/28/2024.   -Holter in 06/2024 showed predominantly AF/AFL.   -Reports holter at [facility] in 09/2024 showed 60% AF/AFL burden.  -Symptomatic with recurrent AF.  -Currently on propafenone.  -ECG today shows NSR 75 bpm with LBBB (QRSd 173 ms).  -AAD options are somewhat limited due to LBBB.    - stop Propafenone 1 week  -Start Multaq 400 mg twice a day after 1 week of being off of propafenone  - if he cannot afford Multaq, will start Amidoarone with close monitor of LFTs and THS  - I have discussed options including continued medical therapy and ablation in detail. I have discussed the risks of left atrial ablation including vascular complications, infection, MI, death, stroke, cardiac tamponade, esophageal fistula, phrenic nerve paralysis, PV stenosis and need for a 2nd procedure with the pt. Patient reports complete understanding of discussions and recommendations and wishes to proceed with the procedure.  -- Schedule for AFib/Pulmonary Vein Isolation ablation next available.   --- Hold OAC the morning of procedure.  --- obtain labs prior to procedure (CMP/CBC).    Anticoagulation:  -Continue Xarelto for thromboembolic

## 2024-10-11 ENCOUNTER — TELEPHONE (OUTPATIENT)
Age: 39
End: 2024-10-11

## 2024-10-11 NOTE — TELEPHONE ENCOUNTER
Referral received from Dr. Hernan Everett requesting new sleep study to evaluate for efficacy of OAT. Called and LVM requesting that patient call [facility] to schedule follow up.    Referral letter scanned into patient media.

## 2024-10-15 ENCOUNTER — TELEMEDICINE (OUTPATIENT)
Age: 39
End: 2024-10-15
Payer: COMMERCIAL

## 2024-10-15 DIAGNOSIS — I48.0 PAROXYSMAL ATRIAL FIBRILLATION (HCC): ICD-10-CM

## 2024-10-15 DIAGNOSIS — G47.33 OSA (OBSTRUCTIVE SLEEP APNEA): Primary | ICD-10-CM

## 2024-10-15 DIAGNOSIS — I10 PRIMARY HYPERTENSION: ICD-10-CM

## 2024-10-15 PROCEDURE — 99213 OFFICE O/P EST LOW 20 MIN: CPT | Performed by: INTERNAL MEDICINE

## 2024-10-15 RX ORDER — AZITHROMYCIN 250 MG/1
TABLET, FILM COATED ORAL
COMMUNITY

## 2024-10-15 ASSESSMENT — SLEEP AND FATIGUE QUESTIONNAIRES
HOW LIKELY ARE YOU TO NOD OFF OR FALL ASLEEP WHILE SITTING AND TALKING TO SOMEONE: WOULD NEVER DOZE
HOW LIKELY ARE YOU TO NOD OFF OR FALL ASLEEP WHILE SITTING AND TALKING TO SOMEONE: WOULD NEVER DOZE
HOW LIKELY ARE YOU TO NOD OFF OR FALL ASLEEP WHEN YOU ARE A PASSENGER IN A CAR FOR AN HOUR WITHOUT A BREAK: WOULD NEVER DOZE
HAS YOUR RELATIONSHIP WITH FAMILY, FRIENDS OR WORK COLLEAGUES BEEN AFFECTED BECAUSE YOU ARE SLEEPY OR TIRED: NO
HOW LIKELY ARE YOU TO NOD OFF OR FALL ASLEEP WHILE SITTING AND READING: WOULD NEVER DOZE
HOW LIKELY ARE YOU TO NOD OFF OR FALL ASLEEP WHILE SITTING QUIETLY AFTER LUNCH WITHOUT ALCOHOL: SLIGHT CHANCE OF DOZING
DO YOU HAVE DIFFICULTY BEING AS ACTIVE AS YOU WANT TO BE IN THE EVENING BECAUSE YOU ARE SLEEPY OR TIRED: NO
DO YOU HAVE DIFFICULTY VISITING YOUR FAMILY OR FRIENDS IN THEIR HOME BECAUSE YOU BECOME SLEEPY OR TIRED: NO
FOSQ SCORE: 20
HOW LIKELY ARE YOU TO NOD OFF OR FALL ASLEEP WHILE LYING DOWN TO REST IN THE AFTERNOON WHEN CIRCUMSTANCES PERMIT: MODERATE CHANCE OF DOZING
HOW LIKELY ARE YOU TO NOD OFF OR FALL ASLEEP IN A CAR, WHILE STOPPED FOR A FEW MINUTES IN TRAFFIC: WOULD NEVER DOZE
DO YOU GENERALLY HAVE DIFFICULTY REMEMBERING THINGS BECAUSE YOU ARE SLEEPY OR TIRED: NO
DO YOU HAVE DIFFICULTY OPERATING A MOTOR VEHICLE FOR SHORT DISTANCES (LESS THAN 100 MILES) BECAUSE YOU BECOME SLEEPY: NO
HOW LIKELY ARE YOU TO NOD OFF OR FALL ASLEEP IN A CAR, WHILE STOPPED FOR A FEW MINUTES IN TRAFFIC: WOULD NEVER DOZE
HOW LIKELY ARE YOU TO NOD OFF OR FALL ASLEEP WHILE SITTING AND READING: WOULD NEVER DOZE
DO YOU HAVE DIFFICULTY CONCENTRATING ON THE THINGS YOU DO BECAUSE YOU ARE SLEEPY OR TIRED: NO
DO YOU HAVE DIFFICULTY BEING AS ACTIVE AS YOU WANT TO BE IN THE MORNING BECAUSE YOU ARE SLEEPY OR TIRED: NO
HOW LIKELY ARE YOU TO NOD OFF OR FALL ASLEEP WHILE SITTING QUIETLY AFTER LUNCH WITHOUT ALCOHOL: SLIGHT CHANCE OF DOZING
ESS TOTAL SCORE: 4
HOW LIKELY ARE YOU TO NOD OFF OR FALL ASLEEP WHILE WATCHING TV: WOULD NEVER DOZE
DO YOU HAVE DIFFICULTY OPERATING A MOTOR VEHICLE FOR LONG DISTANCES (GREATER THAN 100 MILES) BECAUSE YOU BECOME SLEEPY: NO
HOW LIKELY ARE YOU TO NOD OFF OR FALL ASLEEP WHILE SITTING INACTIVE IN A PUBLIC PLACE: SLIGHT CHANCE OF DOZING
DO YOU HAVE DIFFICULTY WATCHING A MOVIE OR VIDEO BECAUSE YOU BECOME SLEEPY OR TIRED: NO
HOW LIKELY ARE YOU TO NOD OFF OR FALL ASLEEP WHILE LYING DOWN TO REST IN THE AFTERNOON WHEN CIRCUMSTANCES PERMIT: MODERATE CHANCE OF DOZING
HOW LIKELY ARE YOU TO NOD OFF OR FALL ASLEEP WHILE SITTING INACTIVE IN A PUBLIC PLACE: SLIGHT CHANCE OF DOZING
HOW LIKELY ARE YOU TO NOD OFF OR FALL ASLEEP WHILE WATCHING TV: WOULD NEVER DOZE
HAS YOUR MOOD BEEN AFFECTED BECAUSE YOU ARE SLEEPY OR TIRED: NO
HOW LIKELY ARE YOU TO NOD OFF OR FALL ASLEEP WHEN YOU ARE A PASSENGER IN A CAR FOR AN HOUR WITHOUT A BREAK: WOULD NEVER DOZE

## 2024-10-15 NOTE — PROGRESS NOTES
[address], [place] [address]  Tel.  [phone]    Fax. [phone]     [address] [address]  Tel.  [phone]    Fax. [phone] [address] [place] [address]  Tel.  [phone]    Fax. [phone]       Carlos Domingo (: 1985) is a 39 y.o. male, established patient, seen for Oral Appliance Therapy follow-up and evaluation:   Sleep Problem (Follow up for OAT efficacy study. [place]ent [place] in [place]. Send link to [phone])       Carlos Domingo was last seen by me on 2023, prior notes reviewed in detail.  Home Sleep Apnea Test (HSAT) performed on 2020 was indicative of an average AHI of 8.7 per hour with an SpO2 jennifer of 86% and SpO2 of < 88% being 2.3 minutes.     WatchPAT Testing performed to assess efficacy of oral appliance therapy on 2023 was indicative of an average pAHI 3% of 20.5 and pAHI 4% of 8.3 per hour.  SpO2 jennifer was 87% and SpO2 of < 88% was 0.00 minutes.      Patient has since continue to follow-up with Dr. Hernan Everett, his device has been adjusted further and he reports of having done well on this form of therapy.  He was referred for sleep evaluation due to recurrent atrial fibrillation following ablation performed earlier this year.    ASSESSMENT/PLAN:   Diagnosis Orders   1. CARLOS (obstructive sleep apnea)  PAT - Home Sleep Test      2. Paroxysmal atrial fibrillation (HCC)        3. Primary hypertension        4. BMI 28.0-28.9,adult            Sleep Apnea -   * He is compliant with Oral Appliance Therapy and OAT continues to benefit patient and remains necessary for control of his sleep apnea.       * Home sleep testing was ordered today to objectively assess for sleep disordered breathing on current therapy.    Orders Placed This Encounter   Procedures    PAT - Home Sleep Test     Standing Status:   Future     Standing Expiration Date:   4/15/2025     Order Specific Question:   Location For

## 2024-10-29 ENCOUNTER — HOSPITAL ENCOUNTER (OUTPATIENT)
Facility: HOSPITAL | Age: 39
Discharge: HOME | End: 2024-11-01
Payer: COMMERCIAL

## 2024-10-29 ENCOUNTER — PROCEDURE VISIT (OUTPATIENT)
Age: 39
End: 2024-10-29

## 2024-10-29 DIAGNOSIS — G47.33 OSA (OBSTRUCTIVE SLEEP APNEA): ICD-10-CM

## 2024-10-29 DIAGNOSIS — G47.33 OSA (OBSTRUCTIVE SLEEP APNEA): Primary | ICD-10-CM

## 2024-10-29 PROCEDURE — 95800 SLP STDY UNATTENDED: CPT | Performed by: INTERNAL MEDICINE

## 2024-10-29 NOTE — PROGRESS NOTES
S>Carlos Domingo is a 39 y.o. male seen today to receive a home sleep testing unit (WatchPAT).    Patient was educated on proper hookup and operation of the WatchPAT HST via detailed instruction sheet .  Instruction forms with after hours contact and documentation were signed.    O>    There were no vitals taken for this visit.      A>  No diagnosis found.      P>  General information regarding operations and maintenance of the device was provided.  Follow-up appointment was made to return the WatchPAT HST. He will be contacted once the results have been reviewed.  He was asked to contact our office for any problems regarding his home sleep test study.

## 2024-11-15 RX ORDER — RIVAROXABAN 20 MG/1
20 TABLET, FILM COATED ORAL DAILY
Qty: 90 TABLET | Refills: 1 | Status: SHIPPED | OUTPATIENT
Start: 2024-11-15

## 2024-11-18 ENCOUNTER — CLINICAL DOCUMENTATION (OUTPATIENT)
Age: 39
End: 2024-11-18

## 2024-11-18 DIAGNOSIS — G47.33 OSA (OBSTRUCTIVE SLEEP APNEA): Primary | ICD-10-CM

## 2024-11-18 NOTE — PROGRESS NOTES
Carlso Domingo is to be contacted by sleep technologists regarding results of WatchPAT Testing which was indicative of an average RDI 21.7, pAHI 3% of 13.6 and pAHI 4% of 5.0 per hour.  SpO2 jennifer was 84% and SpO2 of < 88% was 0.1 minutes.  Residual disorder was noted on oral appliance therapy in this patient with hypertension and atrial fibrillation.  PAP therapy was prescribed due to residual CARLOS on oral appliance therapy and presence of comorbidities.      An APAP prescription has been written and patient will be contacted by office staff regarding follow-up  in 2-3 months after initiation of therapy.    Encounter Diagnosis   Name Primary?    CARLOS (obstructive sleep apnea) Yes       Orders Placed This Encounter   Procedures    SLEEP LAB (PAP TITRATION)     Standing Status:   Future     Standing Expiration Date:   11/18/2025

## 2024-11-19 ENCOUNTER — TELEPHONE (OUTPATIENT)
Age: 39
End: 2024-11-19

## 2024-11-19 DIAGNOSIS — G47.33 OSA (OBSTRUCTIVE SLEEP APNEA): Primary | ICD-10-CM

## 2024-11-19 NOTE — TELEPHONE ENCOUNTER
Reviewed sleep study results with patient. He expressed understanding and is willing to proceed with a trial of APAP.    Please fax DME order & Schedule 1st adherence visit in 31 to 90 days.

## 2024-11-20 NOTE — TELEPHONE ENCOUNTER
Encounter Diagnosis   Name Primary?    CARLOS (obstructive sleep apnea) Yes         Orders Placed This Encounter   Procedures    DME Order for (Specify) as OP     - DME device ordered - ResMed Device with Heated Humidifer  / .   - Diagnosis: Obstructive Sleep Apnea (G47.33)  - Length of Need: Lifetime    Pressure Settin - 15 cmH2O     Nasal Cushion (Replace) 2 per month.     Nasal Interface Mask 1 every 3 months.    Headgear 1 every 6 months.     Filter(s) Disposable 2 per month.   Filter(s) Non-Disposable 1 every 6 months.      Water Chamber for Humidifier (Replace) 1 every 6 months.   Tubing with heating element 1 every 3 months.    Perform Mask Fitting per patient preference and comfort - replace as above.      Brigida Fernandez MD, FAA; NPI: [id number]  Electronically signed. 2024

## 2024-11-21 RX ORDER — CARVEDILOL 12.5 MG/1
12.5 TABLET ORAL 2 TIMES DAILY
Qty: 60 TABLET | Refills: 5 | Status: SHIPPED | OUTPATIENT
Start: 2024-11-21

## 2024-11-21 RX ORDER — CARVEDILOL 12.5 MG/1
12.5 TABLET ORAL 2 TIMES DAILY
Qty: 60 TABLET | Refills: 3 | OUTPATIENT
Start: 2024-11-21

## 2024-11-21 NOTE — TELEPHONE ENCOUNTER
Attempted to contact the patient to discuss PAP device order and DME company options. LVM requesting that patient call [facility] to discuss PAP device order.

## 2024-11-21 NOTE — TELEPHONE ENCOUNTER
VO per NP    Future Appointments   Date Time Provider Department Center   1/13/2025  9:40 AM Moni Juárez APRN - [facility]   2/5/2025  9:00 AM Foreign Guerra APRN - [facility]

## 2024-11-25 ENCOUNTER — TELEPHONE (OUTPATIENT)
Age: 39
End: 2024-11-25

## 2024-11-25 NOTE — TELEPHONE ENCOUNTER
Patient called office regarding voicemail left to have new PAP order sent to a medical equipment company. Patient stated he has not had a medical equipment company in the past and based on insurance patient choose [employer]. Informed patient of contact number to [employer] and stated his new PAP order would be faxed to them today 11/25/2024. Informed patient that they will reach out to him in regards to this order and if he does not hear from them in 5-7 business days to call [employer] and request an update on his order. Patient schedule with first adherence appointment for 04/10/2025. Patient instructed to call [facility] before his first adherence appointment with any questions/concerns about being in compliance with PAP therapy.   Order faxed to [employer].

## 2024-11-26 ENCOUNTER — TELEPHONE (OUTPATIENT)
Age: 39
End: 2024-11-26

## 2024-12-05 NOTE — TELEPHONE ENCOUNTER
Spoke to Pt of Afib Ablation with Dr. Cervantes on 4/14/25  at 10am arriving at 8am Please NPO from Midnight the night prior to the procedure. Please have lab work done Between 3/17/25-4/10/25. Check in at the first floor OutPt Registation desk at [facility]  Medications:  Hold Xarelto day of procedure    VO BY /Nurse Laura ROLLE

## 2024-12-06 ENCOUNTER — TELEPHONE (OUTPATIENT)
Age: 39
End: 2024-12-06

## 2024-12-06 NOTE — TELEPHONE ENCOUNTER
Pt has some questions about the  medication that he was started on and if he needs a fu appt or anything please call him at [phone]

## 2024-12-10 RX ORDER — AMLODIPINE BESYLATE 2.5 MG/1
2.5 TABLET ORAL DAILY
Qty: 90 TABLET | Refills: 1 | Status: SHIPPED | OUTPATIENT
Start: 2024-12-10

## 2024-12-10 NOTE — TELEPHONE ENCOUNTER
VO per NP    Future Appointments   Date Time Provider Department Center   2/5/2025  9:00 AM Foreign Guerra APRN - NP [facility]   3/26/2025  9:40 AM Sherry Alonzo APRN - CNP [facility]   4/10/2025  2:20 PM Brigida Fernandez MD [facility]

## 2025-01-02 DIAGNOSIS — I10 PRIMARY HYPERTENSION: ICD-10-CM

## 2025-01-02 DIAGNOSIS — I48.0 PAF (PAROXYSMAL ATRIAL FIBRILLATION) (HCC): ICD-10-CM

## 2025-01-02 DIAGNOSIS — Z86.79 S/P ABLATION OF ATRIAL FIBRILLATION: ICD-10-CM

## 2025-01-02 DIAGNOSIS — Z98.890 S/P ABLATION OF ATRIAL FIBRILLATION: ICD-10-CM

## 2025-01-02 DIAGNOSIS — R00.2 PALPITATIONS: ICD-10-CM

## 2025-01-02 DIAGNOSIS — Z79.01 ANTICOAGULATION MANAGEMENT ENCOUNTER: ICD-10-CM

## 2025-01-02 DIAGNOSIS — I48.3 TYPICAL ATRIAL FLUTTER (HCC): ICD-10-CM

## 2025-01-02 DIAGNOSIS — Z86.79 S/P ABLATION OF ATRIAL FLUTTER: ICD-10-CM

## 2025-01-02 DIAGNOSIS — Z98.890 S/P ABLATION OF ATRIAL FLUTTER: ICD-10-CM

## 2025-01-02 DIAGNOSIS — Z51.81 ANTICOAGULATION MANAGEMENT ENCOUNTER: ICD-10-CM

## 2025-01-03 RX ORDER — DRONEDARONE 400 MG/1
400 TABLET, FILM COATED ORAL 2 TIMES DAILY WITH MEALS
Qty: 60 TABLET | Refills: 2 | Status: SHIPPED | OUTPATIENT
Start: 2025-01-03

## 2025-01-04 ENCOUNTER — OFFICE VISIT (OUTPATIENT)
Age: 40
End: 2025-01-04

## 2025-01-04 VITALS
BODY MASS INDEX: 28.25 KG/M2 | RESPIRATION RATE: 18 BRPM | HEIGHT: 71 IN | OXYGEN SATURATION: 98 % | SYSTOLIC BLOOD PRESSURE: 146 MMHG | TEMPERATURE: 98.6 F | HEART RATE: 64 BPM | DIASTOLIC BLOOD PRESSURE: 94 MMHG | WEIGHT: 201.8 LBS

## 2025-01-04 DIAGNOSIS — J20.8 VIRAL BRONCHITIS: Primary | ICD-10-CM

## 2025-01-04 DIAGNOSIS — H66.004 RECURRENT ACUTE SUPPURATIVE OTITIS MEDIA OF RIGHT EAR WITHOUT SPONTANEOUS RUPTURE OF TYMPANIC MEMBRANE: ICD-10-CM

## 2025-01-04 RX ORDER — DOXYCYCLINE HYCLATE 100 MG
100 TABLET ORAL 2 TIMES DAILY
Qty: 14 TABLET | Refills: 0 | Status: SHIPPED | OUTPATIENT
Start: 2025-01-04 | End: 2025-01-11

## 2025-01-04 RX ORDER — PREDNISONE 20 MG/1
40 TABLET ORAL DAILY
Qty: 10 TABLET | Refills: 0 | Status: SHIPPED | OUTPATIENT
Start: 2025-01-04 | End: 2025-01-09

## 2025-01-04 NOTE — PROGRESS NOTES
Carlos Domingo (:  1985) is a 39 y.o. male,Established patient, here for evaluation of the following chief complaint(s):  Ear Pain (Congestion, right Ear pain and fullness since last Monday. Pt children has been sick with flu B and other uri illness. Pt has had flu like symptoms for a week. )      Assessment & Plan :  Visit Diagnoses and Associated Orders       Viral bronchitis    -  Primary    predniSONE (DELTASONE) 20 MG tablet [6496]           Recurrent acute suppurative otitis media of right ear without spontaneous rupture of tympanic membrane        doxycycline hyclate (VIBRA-TABS) 100 MG tablet [2625]                    Hypertensive, all other VSS, afebrile. To treat for viral bronchitis with prednisone burst and Xopenex as previously directed. To treat for R OM with doxycycline. May continue to use cough drops PRN. May use nasal saline, cool mist humidifier, rest, increased fluid intake for symptomatic relief. To monitor symptoms and follow-up if symptoms worsen or do not improve on current treatment plan. To ED for severe CP, chest tightness, SOB, rapid worsening of symptoms. Patient verbalized understanding, no questions or concerns at this time. Follow up in PRN days if symptoms persist or if symptoms worsen.       Subjective :  HPI  HPI:   39 y.o. male presents with symptoms of nasal congestion, R ear pain, muffled hearing x 1-2 weeks. Rates ear pain as 3/10 in severity, sore in nature, worse with yawning does not radiate.  Nothing makes symptoms worse. Multiple family members ill with influenza and URI.  Denies fever/chills/sweats/body aches, CP, chest tightness, SOB, cough, rhinitis, nasal congestion, sore throat, HA, ear pain, sinus pain, N/V/D, abdominal pain, swollen glands, rash.       Vitals:    25 1220 25 1231   BP: (!) 146/85 (!) 146/94   Site: Left Upper Arm Left Upper Arm   Position: Sitting Sitting   Cuff Size: Medium Adult Medium Adult   Pulse: 64    Resp: 18    Temp:

## 2025-01-17 ENCOUNTER — HOSPITAL ENCOUNTER (OUTPATIENT)
Facility: HOSPITAL | Age: 40
Discharge: HOME | End: 2025-01-19
Attending: INTERNAL MEDICINE
Payer: COMMERCIAL

## 2025-01-17 ENCOUNTER — TELEPHONE (OUTPATIENT)
Age: 40
End: 2025-01-17

## 2025-01-17 VITALS
OXYGEN SATURATION: 100 % | BODY MASS INDEX: 28 KG/M2 | DIASTOLIC BLOOD PRESSURE: 88 MMHG | SYSTOLIC BLOOD PRESSURE: 124 MMHG | TEMPERATURE: 98 F | RESPIRATION RATE: 16 BRPM | HEIGHT: 71 IN | HEART RATE: 80 BPM | WEIGHT: 200 LBS

## 2025-01-17 DIAGNOSIS — I48.92 ATRIAL FLUTTER (HCC): ICD-10-CM

## 2025-01-17 LAB — ECHO BSA: 2.13 M2

## 2025-01-17 PROCEDURE — 99152 MOD SED SAME PHYS/QHP 5/>YRS: CPT

## 2025-01-17 PROCEDURE — 92960 CARDIOVERSION ELECTRIC EXT: CPT | Performed by: INTERNAL MEDICINE

## 2025-01-17 PROCEDURE — 99153 MOD SED SAME PHYS/QHP EA: CPT

## 2025-01-17 PROCEDURE — 92960 CARDIOVERSION ELECTRIC EXT: CPT

## 2025-01-17 PROCEDURE — 99152 MOD SED SAME PHYS/QHP 5/>YRS: CPT | Performed by: INTERNAL MEDICINE

## 2025-01-17 PROCEDURE — 6360000002 HC RX W HCPCS: Performed by: INTERNAL MEDICINE

## 2025-01-17 PROCEDURE — 6360000002 HC RX W HCPCS

## 2025-01-17 RX ORDER — MIDAZOLAM HYDROCHLORIDE 2 MG/2ML
1 INJECTION, SOLUTION INTRAMUSCULAR; INTRAVENOUS ONCE
Status: DISCONTINUED | OUTPATIENT
Start: 2025-01-17 | End: 2025-01-20 | Stop reason: HOSPADM

## 2025-01-17 RX ORDER — MIDAZOLAM HYDROCHLORIDE 1 MG/ML
INJECTION, SOLUTION INTRAMUSCULAR; INTRAVENOUS PRN
Status: COMPLETED | OUTPATIENT
Start: 2025-01-17 | End: 2025-01-17

## 2025-01-17 RX ORDER — FENTANYL CITRATE 50 UG/ML
INJECTION, SOLUTION INTRAMUSCULAR; INTRAVENOUS PRN
Status: COMPLETED | OUTPATIENT
Start: 2025-01-17 | End: 2025-01-17

## 2025-01-17 RX ORDER — FENTANYL CITRATE 50 UG/ML
INJECTION, SOLUTION INTRAMUSCULAR; INTRAVENOUS
Status: DISPENSED
Start: 2025-01-17 | End: 2025-01-18

## 2025-01-17 RX ORDER — SODIUM CHLORIDE 0.9 % (FLUSH) 0.9 %
5-40 SYRINGE (ML) INJECTION PRN
Status: DISCONTINUED | OUTPATIENT
Start: 2025-01-17 | End: 2025-01-20 | Stop reason: HOSPADM

## 2025-01-17 RX ORDER — AMIODARONE HYDROCHLORIDE 200 MG/1
TABLET ORAL
Qty: 37 TABLET | Refills: 2 | Status: SHIPPED | OUTPATIENT
Start: 2025-01-17 | End: 2025-02-16

## 2025-01-17 RX ORDER — SODIUM CHLORIDE 0.9 % (FLUSH) 0.9 %
5-40 SYRINGE (ML) INJECTION EVERY 12 HOURS SCHEDULED
Status: DISCONTINUED | OUTPATIENT
Start: 2025-01-17 | End: 2025-01-20 | Stop reason: HOSPADM

## 2025-01-17 RX ORDER — MIDAZOLAM HYDROCHLORIDE 1 MG/ML
INJECTION, SOLUTION INTRAMUSCULAR; INTRAVENOUS
Status: DISPENSED
Start: 2025-01-17 | End: 2025-01-18

## 2025-01-17 RX ORDER — MIDAZOLAM HYDROCHLORIDE 1 MG/ML
INJECTION, SOLUTION INTRAMUSCULAR; INTRAVENOUS
Status: COMPLETED
Start: 2025-01-17 | End: 2025-01-17

## 2025-01-17 RX ADMIN — MIDAZOLAM 2 MG: 1 INJECTION INTRAMUSCULAR; INTRAVENOUS at 15:03

## 2025-01-17 RX ADMIN — MIDAZOLAM 2 MG: 1 INJECTION INTRAMUSCULAR; INTRAVENOUS at 14:44

## 2025-01-17 RX ADMIN — FENTANYL CITRATE 50 MCG: 50 INJECTION INTRAMUSCULAR; INTRAVENOUS at 14:47

## 2025-01-17 RX ADMIN — MIDAZOLAM 1 MG: 1 INJECTION INTRAMUSCULAR; INTRAVENOUS at 14:55

## 2025-01-17 RX ADMIN — MIDAZOLAM 2 MG: 1 INJECTION INTRAMUSCULAR; INTRAVENOUS at 14:42

## 2025-01-17 RX ADMIN — MIDAZOLAM 2 MG: 1 INJECTION INTRAMUSCULAR; INTRAVENOUS at 14:47

## 2025-01-17 RX ADMIN — MIDAZOLAM 1 MG: 1 INJECTION INTRAMUSCULAR; INTRAVENOUS at 14:53

## 2025-01-17 RX ADMIN — MIDAZOLAM 1 MG: 1 INJECTION INTRAMUSCULAR; INTRAVENOUS at 14:51

## 2025-01-17 RX ADMIN — FENTANYL CITRATE 50 MCG: 50 INJECTION INTRAMUSCULAR; INTRAVENOUS at 14:42

## 2025-01-17 RX ADMIN — FENTANYL CITRATE 25 MCG: 50 INJECTION INTRAMUSCULAR; INTRAVENOUS at 15:04

## 2025-01-17 RX ADMIN — FENTANYL CITRATE 25 MCG: 50 INJECTION INTRAMUSCULAR; INTRAVENOUS at 14:55

## 2025-01-17 ASSESSMENT — PAIN SCALES - GENERAL: PAINLEVEL_OUTOF10: 0

## 2025-01-17 NOTE — PROGRESS NOTES
Pt arrives ambulatory to noninvasive cardiology department accompanied by his mother for direct current cardioversion procedure. All assessments completed and consent was reviewed.  Education given was regarding procedure, medications to be given, potential side effects of medications to be given, post-procedure management and follow-up. Opportunity for questions was provided and all questions and concerns were addressed. Patient and patient contact verbalized understanding of education.

## 2025-01-17 NOTE — PROCEDURES
BRIEF PROCEDURE NOTE    Date of Procedure: 1/17/2025   Preoperative Diagnosis: atrial fibrillation  Postoperative Diagnosis: same   Procedure: Synchronized DC cardioversion  Cardiologist: Valarie Cervantes MD  Anesthesia:    I administered moderate sedation throughout this procedure. An independent trained observer pushed medications at my direction, and monitored the patient’s level of consciousness and physiological status throughout. Total sedation time of 30 mins  Estimated Blood Loss: None      Informed consent obtained. Appropriate time out performed.  Patient was sedated with IV 11 mg midazolam and 150 mcg fentanyl. Patient has been on uninterrupted anticoagulation for at least 1 month.  After ensuring that patient was adequately sedated, a 200 joule biphasic shock was delivered using anterior and posterior pads converting atrial fibrillation to sinus rhythm.     No complications were noted.     Patient's vital signs were stable and was moving all four extremities at the end of procedure.    Valarie Cervantes MD

## 2025-01-17 NOTE — PROGRESS NOTES
Patient has returned to baseline at arrival for procedure.  Pt awake, alert, and oriented.  VSS. Pt denies chest pain, SOB, and dizziness. Pt remains in SR with LBBB.    I discussed AVS and discharge instructions with patient and I provided a copy for him to take home.    Pt voiced understanding and denied any questions or need for clarification.    IV D/C'd and hemostasis attained. Coban and gauze dressing applied.    Transported with:  JO Ramey RN via w/c to vehicle driven by his mother.

## 2025-01-17 NOTE — TELEPHONE ENCOUNTER
Called and spoke with Mr. Domingo. He was at Dr. Blake's office today and noted to be in AFL. Dr. Blake spoke with Dr. Cervantes and they planned for him to come to [facility] for cardioversion today. He is scheduled for 3 pm today for cardioversion with conscious sedation with Dr. Cervantes. He will arrive at 1:30 pm as instructed. He will remain NPO until his procedure, he did eat breakfast at 8 am. Gave instructions on where to go when he gets to [facility]. Discussed that he will need a  to and from today.

## 2025-01-20 LAB — ECHO BSA: 2.13 M2

## 2025-01-22 NOTE — TELEPHONE ENCOUNTER
Called and spoke to pt he is unable to move procedure up to 3/31/25. I let him know that we will put him on a CX list and call if we get anything sooner. But to keep all his appts as is

## 2025-02-19 ENCOUNTER — OFFICE VISIT (OUTPATIENT)
Age: 40
End: 2025-02-19

## 2025-02-19 VITALS
DIASTOLIC BLOOD PRESSURE: 90 MMHG | SYSTOLIC BLOOD PRESSURE: 130 MMHG | OXYGEN SATURATION: 98 % | RESPIRATION RATE: 16 BRPM | BODY MASS INDEX: 28.03 KG/M2 | HEART RATE: 73 BPM | WEIGHT: 201 LBS | TEMPERATURE: 98.2 F

## 2025-02-19 DIAGNOSIS — J20.8 ACUTE BACTERIAL BRONCHITIS: Primary | ICD-10-CM

## 2025-02-19 DIAGNOSIS — B96.89 ACUTE BACTERIAL BRONCHITIS: Primary | ICD-10-CM

## 2025-02-19 RX ORDER — BENZONATATE 200 MG/1
200 CAPSULE ORAL 3 TIMES DAILY PRN
Qty: 21 CAPSULE | Refills: 0 | Status: SHIPPED | OUTPATIENT
Start: 2025-02-19 | End: 2025-02-26

## 2025-02-19 RX ORDER — AZITHROMYCIN 250 MG/1
TABLET, FILM COATED ORAL
Qty: 6 TABLET | Refills: 0 | Status: SHIPPED | OUTPATIENT
Start: 2025-02-19 | End: 2025-03-01

## 2025-02-19 NOTE — PROGRESS NOTES
2025   Carlos Domingo (: 1985) is a 39 y.o. male, established patient, here for evaluation of the following chief complaint(s):  Cough (Pt presents with cough/chest congestion and R ear \"crackling\" noises)     ASSESSMENT/PLAN:  Below is the assessment and plan developed based on review of pertinent history, physical exam, labs, studies, and medications.  1. Acute bacterial bronchitis  -     azithromycin (ZITHROMAX) 250 MG tablet; 500mg on day 1 followed by 250mg on days 2 - 5, Disp-6 tablet, R-0Normal  -     benzonatate (TESSALON) 200 MG capsule; Take 1 capsule by mouth 3 times daily as needed for Cough, Disp-21 capsule, R-0Normal    Symptoms consistent with acute bacterial bronchitis  Offered to order chest x-ray today due to ongoing cough, patient declined  Ear exam normal today.  Vital signs are stable, no red flag symptoms to warrant further evaluation in ED at this time  Will treat with Azithromycin due to prolonged duration and failure to improve with conservative treatments  Benzonatate up to 3x daily for cough  Lots of fluids, plenty of rest  Hot tea with honey, throat lozenges  Follow up with PCP if symptoms persist or worsen  Go to ED if you develop any shortness of breath, chest pain or if you are unable to tolerate food or fluids  Handout given with care instructions  2. OTC for symptom management. Increase fluid intake, ensure adequate nutritional intake.  3. Follow up with PCP as needed.  4. Go to ED with development of any acute symptoms.     Follow up:    Follow up immediately for any new, worsening or changes or if symptoms are not improving over the next 5-7 days.     SUBJECTIVE/OBJECTIVE:  39-year-old patient here today with complaint of ongoing chest congestion and cough for approximately 10 days.  Reports that he did have some nasal congestion which has since resolved.  Denies any fever, body aches, nausea, or vomiting.  Reports that he is taking OTC Mucinex and and over-the-counter

## 2025-02-19 NOTE — PATIENT INSTRUCTIONS
Symptoms consistent with acute bacterial bronchitis  Vital signs are stable, no shortness of breath or red flag symptoms to warrant further evaluation in ED at this time  Will treat with Azithromycin due to prolonged duration and failure to improve with conservative treatments  Benzonatate up to 3x daily for cough  Lots of fluids, plenty of rest  Hot tea with honey, throat lozenges  Follow up with PCP if symptoms persist or worsen  Go to ED if you develop any shortness of breath, chest pain or if you are unable to tolerate food or fluids

## 2025-03-18 NOTE — PROGRESS NOTES
Cardiac Electrophysiology OFFICE Follow-up Note       Assessment/Plan:   1. PAF (paroxysmal atrial fibrillation) (HCC)  -     EKG 12 Lead  -     Basic Metabolic Panel; Future  -     CBC with Auto Differential; Future  2. Typical atrial flutter (HCC)  -     EKG 12 Lead  -     Basic Metabolic Panel; Future  -     CBC with Auto Differential; Future  3. S/P ablation of atrial fibrillation  -     EKG 12 Lead  -     Basic Metabolic Panel; Future  -     CBC with Auto Differential; Future  4. S/P ablation of atrial flutter  -     EKG 12 Lead  -     Basic Metabolic Panel; Future  -     CBC with Auto Differential; Future  5. Primary hypertension  -     EKG 12 Lead  -     Basic Metabolic Panel; Future  -     CBC with Auto Differential; Future  6. Anticoagulation management encounter  -     EKG 12 Lead  -     Basic Metabolic Panel; Future  -     CBC with Auto Differential; Future  7. CARLOS (obstructive sleep apnea)  -     EKG 12 Lead  -     Basic Metabolic Panel; Future  -     CBC with Auto Differential; Future  8. High risk medication use  -     EKG 12 Lead  -     Basic Metabolic Panel; Future  -     CBC with Auto Differential; Future         Primary Cardiologist: Hayden      Paroxysmal atrial fibrillation/persistent atrial flutter:  -Atrial flutter had left bundle aberrancy.  -Now s/p pulsed field ablation (PVI, GP, Ligament of Abdirashid) & confirmation of CTI block (02/22/2024-[facility]).  -Stopped amiodarone 1 month post ablation.  -Recurrent AFL with RVR noted via ECG on 06/28/2024.   -Holter in 06/2024 showed predominantly AF/AFL.   -Reports holter at [facility] in 09/2024 showed 60% AF/AFL burden.  -Symptomatic with recurrent AF.  -Previously on propafenone   -AAD options are somewhat limited due to LBBB.    -Multaq too expensive   - S/p DCCV 1/17/2025 with Dr. Cervantes, Amiodarone started  - I have discussed options including continued medical therapy and ablation in detail. I have discussed the risks of left

## 2025-03-26 ENCOUNTER — PREP FOR PROCEDURE (OUTPATIENT)
Age: 40
End: 2025-03-26

## 2025-03-26 ENCOUNTER — OFFICE VISIT (OUTPATIENT)
Age: 40
End: 2025-03-26
Payer: COMMERCIAL

## 2025-03-26 VITALS
DIASTOLIC BLOOD PRESSURE: 86 MMHG | HEIGHT: 71 IN | SYSTOLIC BLOOD PRESSURE: 132 MMHG | WEIGHT: 200 LBS | OXYGEN SATURATION: 100 % | HEART RATE: 70 BPM | BODY MASS INDEX: 28 KG/M2

## 2025-03-26 DIAGNOSIS — Z51.81 ANTICOAGULATION MANAGEMENT ENCOUNTER: ICD-10-CM

## 2025-03-26 DIAGNOSIS — Z86.79 S/P ABLATION OF ATRIAL FLUTTER: ICD-10-CM

## 2025-03-26 DIAGNOSIS — Z86.79 S/P ABLATION OF ATRIAL FIBRILLATION: ICD-10-CM

## 2025-03-26 DIAGNOSIS — G47.33 OSA (OBSTRUCTIVE SLEEP APNEA): ICD-10-CM

## 2025-03-26 DIAGNOSIS — Z79.899 HIGH RISK MEDICATION USE: ICD-10-CM

## 2025-03-26 DIAGNOSIS — Z98.890 S/P ABLATION OF ATRIAL FLUTTER: ICD-10-CM

## 2025-03-26 DIAGNOSIS — Z98.890 S/P ABLATION OF ATRIAL FIBRILLATION: ICD-10-CM

## 2025-03-26 DIAGNOSIS — I48.3 TYPICAL ATRIAL FLUTTER (HCC): ICD-10-CM

## 2025-03-26 DIAGNOSIS — I10 PRIMARY HYPERTENSION: ICD-10-CM

## 2025-03-26 DIAGNOSIS — I48.0 PAF (PAROXYSMAL ATRIAL FIBRILLATION) (HCC): Primary | ICD-10-CM

## 2025-03-26 DIAGNOSIS — Z79.01 ANTICOAGULATION MANAGEMENT ENCOUNTER: ICD-10-CM

## 2025-03-26 DIAGNOSIS — I48.0 PAF (PAROXYSMAL ATRIAL FIBRILLATION) (HCC): ICD-10-CM

## 2025-03-26 LAB
ANION GAP SERPL CALC-SCNC: 6 MMOL/L (ref 2–12)
BASOPHILS # BLD: 0.04 K/UL (ref 0–0.1)
BASOPHILS NFR BLD: 0.7 % (ref 0–1)
BUN SERPL-MCNC: 19 MG/DL (ref 6–20)
BUN/CREAT SERPL: 14 (ref 12–20)
CALCIUM SERPL-MCNC: 9.3 MG/DL (ref 8.5–10.1)
CHLORIDE SERPL-SCNC: 103 MMOL/L (ref 97–108)
CO2 SERPL-SCNC: 27 MMOL/L (ref 21–32)
CREAT SERPL-MCNC: 1.4 MG/DL (ref 0.7–1.3)
DIFFERENTIAL METHOD BLD: NORMAL
EOSINOPHIL # BLD: 0.19 K/UL (ref 0–0.4)
EOSINOPHIL NFR BLD: 3.3 % (ref 0–7)
ERYTHROCYTE [DISTWIDTH] IN BLOOD BY AUTOMATED COUNT: 13.5 % (ref 11.5–14.5)
GLUCOSE SERPL-MCNC: 93 MG/DL (ref 65–100)
HCT VFR BLD AUTO: 41.1 % (ref 36.6–50.3)
HGB BLD-MCNC: 12.9 G/DL (ref 12.1–17)
IMM GRANULOCYTES # BLD AUTO: 0.02 K/UL (ref 0–0.04)
IMM GRANULOCYTES NFR BLD AUTO: 0.3 % (ref 0–0.5)
LYMPHOCYTES # BLD: 1.09 K/UL (ref 0.8–3.5)
LYMPHOCYTES NFR BLD: 19 % (ref 12–49)
MCH RBC QN AUTO: 26.8 PG (ref 26–34)
MCHC RBC AUTO-ENTMCNC: 31.4 G/DL (ref 30–36.5)
MCV RBC AUTO: 85.3 FL (ref 80–99)
MONOCYTES # BLD: 0.65 K/UL (ref 0–1)
MONOCYTES NFR BLD: 11.3 % (ref 5–13)
NEUTS SEG # BLD: 3.74 K/UL (ref 1.8–8)
NEUTS SEG NFR BLD: 65.4 % (ref 32–75)
NRBC # BLD: 0 K/UL (ref 0–0.01)
NRBC BLD-RTO: 0 PER 100 WBC
PLATELET # BLD AUTO: 230 K/UL (ref 150–400)
PMV BLD AUTO: 11.1 FL (ref 8.9–12.9)
POTASSIUM SERPL-SCNC: 4.2 MMOL/L (ref 3.5–5.1)
RBC # BLD AUTO: 4.82 M/UL (ref 4.1–5.7)
SODIUM SERPL-SCNC: 136 MMOL/L (ref 136–145)
WBC # BLD AUTO: 5.7 K/UL (ref 4.1–11.1)

## 2025-03-26 PROCEDURE — 3079F DIAST BP 80-89 MM HG: CPT

## 2025-03-26 PROCEDURE — 93000 ELECTROCARDIOGRAM COMPLETE: CPT

## 2025-03-26 PROCEDURE — 3075F SYST BP GE 130 - 139MM HG: CPT

## 2025-03-26 PROCEDURE — 99214 OFFICE O/P EST MOD 30 MIN: CPT

## 2025-03-26 RX ORDER — CETIRIZINE HYDROCHLORIDE 10 MG/1
10 TABLET ORAL DAILY
COMMUNITY

## 2025-03-26 ASSESSMENT — PATIENT HEALTH QUESTIONNAIRE - PHQ9
2. FEELING DOWN, DEPRESSED OR HOPELESS: NOT AT ALL
1. LITTLE INTEREST OR PLEASURE IN DOING THINGS: NOT AT ALL
SUM OF ALL RESPONSES TO PHQ QUESTIONS 1-9: 0

## 2025-03-26 NOTE — PATIENT INSTRUCTIONS
You are scheduled for the following procedure with Dr. Cervantes:  for Afib Ablation at [facility], [address] [place] [address]         PLEASE be aware that your procedure date/time is tentative and subject to change due to emergency cases.    Any changes to your procedure date/time will be communicated by the hospital staff.        Procedure date/time:    Thursday, April 17, 2025 at 10:00 am - please arrive by 8:00 am     ARRIVAL time:  (You will need  to be discharged home with.)      [X]  [place] procedures: arrive to check in on 1st floor near  entrance two hours prior to your procedure.        Pre-procedure Labs:     PRE-PROCEDURE LABS NEEDED: Yes   -  please have labs done after 3/17/25 and before 4/10/25    Forms for labwork may be given at appointment. If not, they will be mailed to you.  These can be done at any [facility] or [facility] lab.                         [facility]  [address], [address]  [place], [place] [address]  Monday-Friday 730A-430P (closed 1230-130P)  [phone]     [address], [address]   [place] [address]  Monday-Friday 8:00AM - 5:00 PM   [phone]     [facility]  [address], [address]  [place], [place] [address]  Monday-Friday 7A-5P  [phone]     [facility]  [address], [address]  [place], [place] [address]  Monday-Friday 730A-430P  [phone] (closed 1-2P)     [facility]  [address] , [address]  [place], [place] [address]  Monday-Friday 730A-430P (closed 1230-130P)  [phone]     [facility]  [address] [place], [place] [address]  Monday-Friday 7A-430P  [phone]     [facility]  [address]  [place], [place] [address]  Monday-Friday 730A-430P (closed 1-2P)  [phone]

## 2025-03-26 NOTE — PROGRESS NOTES
1. Have you been to the ER, urgent care clinic since your last visit?  Hospitalized since your last visit?  Urgent care 2/19/2025 for flu     2. Have you seen or consulted any other health care providers outside of the [facility] since your last visit?  Include any pap smears or colon screening.   Dr. [facility]

## 2025-03-27 RX ORDER — SODIUM CHLORIDE 0.9 % (FLUSH) 0.9 %
5-40 SYRINGE (ML) INJECTION EVERY 12 HOURS SCHEDULED
Status: CANCELLED | OUTPATIENT
Start: 2025-03-27

## 2025-03-27 RX ORDER — SODIUM CHLORIDE 0.9 % (FLUSH) 0.9 %
5-40 SYRINGE (ML) INJECTION PRN
Status: CANCELLED | OUTPATIENT
Start: 2025-03-27

## 2025-03-27 RX ORDER — SODIUM CHLORIDE 9 MG/ML
INJECTION, SOLUTION INTRAVENOUS PRN
Status: CANCELLED | OUTPATIENT
Start: 2025-03-27

## 2025-04-08 ASSESSMENT — SLEEP AND FATIGUE QUESTIONNAIRES
DO YOU GENERALLY HAVE DIFFICULTY REMEMBERING THINGS BECAUSE YOU ARE SLEEPY OR TIRED: NO
FOSQ SCORE: 19.5
HOW LIKELY ARE YOU TO NOD OFF OR FALL ASLEEP WHILE SITTING AND READING: SLIGHT CHANCE OF DOZING
HOW LIKELY ARE YOU TO NOD OFF OR FALL ASLEEP WHILE SITTING AND READING: SLIGHT CHANCE OF DOZING
HOW LIKELY ARE YOU TO NOD OFF OR FALL ASLEEP WHILE SITTING AND TALKING TO SOMEONE: WOULD NEVER DOZE
DO YOU HAVE DIFFICULTY OPERATING A MOTOR VEHICLE FOR LONG DISTANCES (GREATER THAN 100 MILES) BECAUSE YOU BECOME SLEEPY: NO
HAS YOUR RELATIONSHIP WITH FAMILY, FRIENDS OR WORK COLLEAGUES BEEN AFFECTED BECAUSE YOU ARE SLEEPY OR TIRED: NO
ESS TOTAL SCORE: 6
DO YOU HAVE DIFFICULTY CONCENTRATING ON THE THINGS YOU DO BECAUSE YOU ARE SLEEPY OR TIRED: NO
HOW LIKELY ARE YOU TO NOD OFF OR FALL ASLEEP WHILE LYING DOWN TO REST IN THE AFTERNOON WHEN CIRCUMSTANCES PERMIT: MODERATE CHANCE OF DOZING
DO YOU HAVE DIFFICULTY OPERATING A MOTOR VEHICLE FOR SHORT DISTANCES (LESS THAN 100 MILES) BECAUSE YOU BECOME SLEEPY: NO
HOW LIKELY ARE YOU TO NOD OFF OR FALL ASLEEP WHILE SITTING QUIETLY AFTER LUNCH WITHOUT ALCOHOL: WOULD NEVER DOZE
HOW LIKELY ARE YOU TO NOD OFF OR FALL ASLEEP WHEN YOU ARE A PASSENGER IN A CAR FOR AN HOUR WITHOUT A BREAK: SLIGHT CHANCE OF DOZING
HOW LIKELY ARE YOU TO NOD OFF OR FALL ASLEEP WHILE SITTING AND TALKING TO SOMEONE: WOULD NEVER DOZE
HOW LIKELY ARE YOU TO NOD OFF OR FALL ASLEEP IN A CAR, WHILE STOPPED FOR A FEW MINUTES IN TRAFFIC: WOULD NEVER DOZE
HOW LIKELY ARE YOU TO NOD OFF OR FALL ASLEEP WHILE SITTING QUIETLY AFTER LUNCH WITHOUT ALCOHOL: WOULD NEVER DOZE
HOW LIKELY ARE YOU TO NOD OFF OR FALL ASLEEP WHEN YOU ARE A PASSENGER IN A CAR FOR AN HOUR WITHOUT A BREAK: SLIGHT CHANCE OF DOZING
DO YOU HAVE DIFFICULTY VISITING YOUR FAMILY OR FRIENDS IN THEIR HOME BECAUSE YOU BECOME SLEEPY OR TIRED: NO
HOW LIKELY ARE YOU TO NOD OFF OR FALL ASLEEP WHILE WATCHING TV: SLIGHT CHANCE OF DOZING
DO YOU HAVE DIFFICULTY BEING AS ACTIVE AS YOU WANT TO BE IN THE EVENING BECAUSE YOU ARE SLEEPY OR TIRED: NO
HOW LIKELY ARE YOU TO NOD OFF OR FALL ASLEEP IN A CAR, WHILE STOPPED FOR A FEW MINUTES IN TRAFFIC: WOULD NEVER DOZE
HOW LIKELY ARE YOU TO NOD OFF OR FALL ASLEEP WHILE SITTING INACTIVE IN A PUBLIC PLACE: SLIGHT CHANCE OF DOZING
DO YOU HAVE DIFFICULTY BEING AS ACTIVE AS YOU WANT TO BE IN THE MORNING BECAUSE YOU ARE SLEEPY OR TIRED: NO
HAS YOUR MOOD BEEN AFFECTED BECAUSE YOU ARE SLEEPY OR TIRED: NO
HOW LIKELY ARE YOU TO NOD OFF OR FALL ASLEEP WHILE LYING DOWN TO REST IN THE AFTERNOON WHEN CIRCUMSTANCES PERMIT: MODERATE CHANCE OF DOZING
DO YOU HAVE DIFFICULTY WATCHING A MOVIE OR VIDEO BECAUSE YOU BECOME SLEEPY OR TIRED: YES, A LITTLE
HOW LIKELY ARE YOU TO NOD OFF OR FALL ASLEEP WHILE SITTING INACTIVE IN A PUBLIC PLACE: SLIGHT CHANCE OF DOZING
HOW LIKELY ARE YOU TO NOD OFF OR FALL ASLEEP WHILE WATCHING TV: SLIGHT CHANCE OF DOZING

## 2025-04-10 ENCOUNTER — CLINICAL DOCUMENTATION (OUTPATIENT)
Age: 40
End: 2025-04-10

## 2025-04-10 ENCOUNTER — OFFICE VISIT (OUTPATIENT)
Age: 40
End: 2025-04-10
Payer: COMMERCIAL

## 2025-04-10 VITALS
WEIGHT: 199.5 LBS | DIASTOLIC BLOOD PRESSURE: 85 MMHG | TEMPERATURE: 98.2 F | BODY MASS INDEX: 27.93 KG/M2 | OXYGEN SATURATION: 99 % | HEIGHT: 71 IN | HEART RATE: 73 BPM | SYSTOLIC BLOOD PRESSURE: 143 MMHG

## 2025-04-10 DIAGNOSIS — G47.33 OSA (OBSTRUCTIVE SLEEP APNEA): Primary | ICD-10-CM

## 2025-04-10 DIAGNOSIS — I10 PRIMARY HYPERTENSION: ICD-10-CM

## 2025-04-10 DIAGNOSIS — I48.0 PAROXYSMAL ATRIAL FIBRILLATION (HCC): ICD-10-CM

## 2025-04-10 PROCEDURE — G2211 COMPLEX E/M VISIT ADD ON: HCPCS | Performed by: INTERNAL MEDICINE

## 2025-04-10 PROCEDURE — 3077F SYST BP >= 140 MM HG: CPT | Performed by: INTERNAL MEDICINE

## 2025-04-10 PROCEDURE — 99213 OFFICE O/P EST LOW 20 MIN: CPT | Performed by: INTERNAL MEDICINE

## 2025-04-10 PROCEDURE — 3079F DIAST BP 80-89 MM HG: CPT | Performed by: INTERNAL MEDICINE

## 2025-04-10 NOTE — PATIENT INSTRUCTIONS
[address]  [place] [address]  Tel.  [phone]  Fax. [phone] [address]  [place] [address]  Tel.  [phone]  Fax. [phone] [address] [place] [address]  Tel.  [phone]  Fax. [phone]     Learning About CPAP for Sleep Apnea  What is CPAP?              CPAP is a small machine that you use at home every night while you sleep. It increases air pressure in your throat to keep your airway open. When you have sleep apnea, this can help you sleep better so you feel much better. CPAP stands for \"continuous positive airway pressure.\"  The CPAP machine will have one of the following:  A mask that covers your nose and mouth  Prongs that fit into your nose  A mask that covers your nose only, the most common type. This type is called NCPAP. The N stands for \"nasal.\"  Why is it done?  CPAP is usually the best treatment for obstructive sleep apnea. It is the first treatment choice and the most widely used. Your doctor may suggest CPAP if you have:  Moderate to severe sleep apnea.  Sleep apnea and coronary artery disease (CAD) or heart failure.  How does it help?  CPAP can help you have more normal sleep, so you feel less sleepy and more alert during the daytime.  CPAP may help keep heart failure or other heart problems from getting worse.  NCPAP may help lower your blood pressure.  If you use CPAP, your bed partner may also sleep better because you are not snoring or restless.  What are the side effects?  Some people who use CPAP have:  A dry or stuffy nose and a sore throat.  Irritated skin on the face.  Sore eyes.  Bloating.  If you have any of these problems, work with your doctor to fix them. Here are some things you can try:  Be sure the mask or nasal prongs fit well.  See if your doctor can adjust the pressure of your CPAP.  If your nose is dry, try a humidifier.  If your nose is runny or stuffy, try decongestant medicine or a steroid

## 2025-04-11 NOTE — PROGRESS NOTES
Chief Complaint   Patient presents with    Sleep Problem     1st adherence      
Discussed side sleeping with patient. He was concerned when he laid on his side he wasn't getting the same amount of air through the mask. I explained that the pressure would not be changed by sleeping on his side.  
shown to reduce severity of obstructive sleep apnea.     Return for follow-up in 1 year or as needed.       SUBJECTIVE/OBJECTIVE:    He  is seen today for follow up on PAP device and reports no problems using the device.   The following concerns identified:    Drowsiness no Problems exhaling no   Snoring no Forget to put on no   Mask Comfortable no Can't fall asleep no   Dry Mouth no Mask falls off no   Air Leaking no Frequent awakenings no       He admits that his sleep has improved on PAP therapy using nasal pillow mask and heated tubing.  He reports of difficulties breathing with current mask and headgear. He is currently using his Oral Appliance Therapy.    Review of device download indicated:    Usage Days >= 4 hours 37 %  Median Usage  4 hour 28 minutes    Median Device Pressure 6.4 cmH2O  Device Pressure 95% 8.4 cmH2O    Median Leak 1.8 L/min  95% Leak 9.6 L/min    Average AHI: 1.5 /hr which reflects improved sleep breathing condition.    [place] Sleepiness Score: (Patient-Rptd) 6   Modified F.O.S.Q. Score Total / 2: (Patient-Rptd) (P) 19.5       Sleep Review of Systems: notable for Negative difficulty falling asleep; Positive awakenings at night; Negative  early morning headaches; Negative  memory problems; Negative  concentration issues; Negative  chest pain; Negative  shortness of breath;  Negative rashes or itching; Negative heartburn / belching / flatulence; Negative  significant mood issues; no afternoon naps per week.      BP (!) 143/85 (BP Site: Right Upper Arm, Patient Position: Sitting, BP Cuff Size: Large Adult)   Pulse 73   Temp 98.2 °F (36.8 °C) (Temporal)   Ht 1.803 m (5' 11\")   Wt 90.5 kg (199 lb 8 oz)   SpO2 99%   BMI 27.82 kg/m²         General:   Alert, oriented, not in acute distress   Eyes:  Anicteric Sclerae; intact EOM's   Nose:  No obvious nasal septum deviation    Oropharynx:   Mallampati score 4, thick tongue base, uvula not seen due to low-lying soft palate, narrow

## 2025-04-16 ENCOUNTER — ANESTHESIA EVENT (OUTPATIENT)
Facility: HOSPITAL | Age: 40
DRG: 274 | End: 2025-04-16
Payer: COMMERCIAL

## 2025-04-16 NOTE — ANESTHESIA PRE PROCEDURE
Department of Anesthesiology  Preprocedure Note       Name:  Carlos Domingo   Age:  39 y.o.  :  1985                                          MRN:  [id number]         Date:  2025      Surgeon: Surgeon(s):  Valarie Cervantes MD    Procedure: Procedure(s):  Ablation A-fib w complete ep study    Medications prior to admission:   Prior to Admission medications    Medication Sig Start Date End Date Taking? Authorizing Provider   cetirizine (ZYRTEC) 10 MG tablet Take 1 tablet by mouth daily    ProviderDaphne MD   amiodarone (CORDARONE) 200 MG tablet Take 2 tablets by mouth daily for 7 days, THEN 1 tablet daily for 23 days. 1/17/25 4/10/25  Sherry Alonzo APRN - CNP   amLODIPine (NORVASC) 2.5 MG tablet TAKE 1 TABLET BY MOUTH DAILY 12/10/24   Moni Juárez APRN - NP   carvedilol (COREG) 12.5 MG tablet TAKE 1 TABLET BY MOUTH TWICE DAILY 24   Moni Juárez APRN - NP   rivaroxaban (XARELTO) 20 MG TABS tablet TAKE 1 TABLET BY MOUTH DAILY WITH BREAKFAST 11/15/24   Valarie Cervantes MD   valsartan (DIOVAN) 160 MG tablet TAKE 1 TABLET BY MOUTH EVERY DAY 24   Valarie Cervantes MD   levalbuterol (XOPENEX HFA) 45 MCG/ACT inhaler Inhale 1-2 puffs into the lungs every 6 hours as needed 24   ProviderDaphne MD   vitamin D 25 MCG (1000 UT) CAPS Take by mouth daily    Automatic Reconciliation, Ar       Current medications:    No current facility-administered medications for this encounter.     Current Outpatient Medications   Medication Sig Dispense Refill    cetirizine (ZYRTEC) 10 MG tablet Take 1 tablet by mouth daily      amiodarone (CORDARONE) 200 MG tablet Take 2 tablets by mouth daily for 7 days, THEN 1 tablet daily for 23 days. 37 tablet 2    amLODIPine (NORVASC) 2.5 MG tablet TAKE 1 TABLET BY MOUTH DAILY 90 tablet 1    carvedilol (COREG) 12.5 MG tablet TAKE 1 TABLET BY MOUTH TWICE DAILY 60 tablet 5    rivaroxaban (XARELTO) 20 MG TABS tablet TAKE 1 TABLET BY MOUTH DAILY WITH BREAKFAST 90 tablet 1

## 2025-04-17 ENCOUNTER — ANESTHESIA (OUTPATIENT)
Facility: HOSPITAL | Age: 40
DRG: 274 | End: 2025-04-17
Payer: COMMERCIAL

## 2025-04-17 ENCOUNTER — HOSPITAL ENCOUNTER (INPATIENT)
Facility: HOSPITAL | Age: 40
LOS: 1 days | Discharge: HOME | DRG: 274 | End: 2025-04-18
Attending: INTERNAL MEDICINE | Admitting: INTERNAL MEDICINE
Payer: COMMERCIAL

## 2025-04-17 DIAGNOSIS — I48.0 PAF (PAROXYSMAL ATRIAL FIBRILLATION) (HCC): ICD-10-CM

## 2025-04-17 PROBLEM — Z98.890 S/P ABLATION OF ATRIAL FIBRILLATION: Status: ACTIVE | Noted: 2025-04-17

## 2025-04-17 PROBLEM — Z86.79 S/P ABLATION OF ATRIAL FIBRILLATION: Status: ACTIVE | Noted: 2025-04-17

## 2025-04-17 LAB
ABO + RH BLD: NORMAL
ACT BLD: 297 SECS (ref 79–138)
ACT BLD: 302 SECS (ref 79–138)
ACT BLD: 308 SECS (ref 79–138)
ACT BLD: 320 SECS (ref 79–138)
ACT BLD: 331 SECS (ref 79–138)
BLOOD GROUP ANTIBODIES SERPL: NORMAL
ECHO BSA: 2.13 M2
EKG ATRIAL RATE: 62 BPM
EKG DIAGNOSIS: NORMAL
EKG P AXIS: 70 DEGREES
EKG P-R INTERVAL: 192 MS
EKG Q-T INTERVAL: 544 MS
EKG QRS DURATION: 174 MS
EKG QTC CALCULATION (BAZETT): 552 MS
EKG R AXIS: -42 DEGREES
EKG T AXIS: 35 DEGREES
EKG VENTRICULAR RATE: 62 BPM
SPECIMEN EXP DATE BLD: NORMAL

## 2025-04-17 PROCEDURE — 2580000003 HC RX 258: Performed by: NURSE ANESTHETIST, CERTIFIED REGISTERED

## 2025-04-17 PROCEDURE — C1732 CATH, EP, DIAG/ABL, 3D/VECT: HCPCS | Performed by: INTERNAL MEDICINE

## 2025-04-17 PROCEDURE — 86850 RBC ANTIBODY SCREEN: CPT

## 2025-04-17 PROCEDURE — 93623 PRGRMD STIMJ&PACG IV RX NFS: CPT | Performed by: INTERNAL MEDICINE

## 2025-04-17 PROCEDURE — 93656 COMPRE EP EVAL ABLTJ ATR FIB: CPT | Performed by: INTERNAL MEDICINE

## 2025-04-17 PROCEDURE — C1766 INTRO/SHEATH,STRBLE,NON-PEEL: HCPCS | Performed by: INTERNAL MEDICINE

## 2025-04-17 PROCEDURE — 2709999900 HC NON-CHARGEABLE SUPPLY: Performed by: INTERNAL MEDICINE

## 2025-04-17 PROCEDURE — 6370000000 HC RX 637 (ALT 250 FOR IP): Performed by: INTERNAL MEDICINE

## 2025-04-17 PROCEDURE — 93005 ELECTROCARDIOGRAM TRACING: CPT | Performed by: INTERNAL MEDICINE

## 2025-04-17 PROCEDURE — 5A2204Z RESTORATION OF CARDIAC RHYTHM, SINGLE: ICD-10-PCS | Performed by: INTERNAL MEDICINE

## 2025-04-17 PROCEDURE — 02K83ZZ MAP CONDUCTION MECHANISM, PERCUTANEOUS APPROACH: ICD-10-PCS | Performed by: INTERNAL MEDICINE

## 2025-04-17 PROCEDURE — C1759 CATH, INTRA ECHOCARDIOGRAPHY: HCPCS | Performed by: INTERNAL MEDICINE

## 2025-04-17 PROCEDURE — C1760 CLOSURE DEV, VASC: HCPCS | Performed by: INTERNAL MEDICINE

## 2025-04-17 PROCEDURE — 2720000010 HC SURG SUPPLY STERILE: Performed by: INTERNAL MEDICINE

## 2025-04-17 PROCEDURE — 86901 BLOOD TYPING SEROLOGIC RH(D): CPT

## 2025-04-17 PROCEDURE — 6360000002 HC RX W HCPCS: Performed by: NURSE ANESTHETIST, CERTIFIED REGISTERED

## 2025-04-17 PROCEDURE — G0378 HOSPITAL OBSERVATION PER HR: HCPCS

## 2025-04-17 PROCEDURE — 2500000003 HC RX 250 WO HCPCS: Performed by: NURSE ANESTHETIST, CERTIFIED REGISTERED

## 2025-04-17 PROCEDURE — 2060000000 HC ICU INTERMEDIATE R&B

## 2025-04-17 PROCEDURE — 4A0234Z MEASUREMENT OF CARDIAC ELECTRICAL ACTIVITY, PERCUTANEOUS APPROACH: ICD-10-PCS | Performed by: INTERNAL MEDICINE

## 2025-04-17 PROCEDURE — C1894 INTRO/SHEATH, NON-LASER: HCPCS | Performed by: INTERNAL MEDICINE

## 2025-04-17 PROCEDURE — 86900 BLOOD TYPING SEROLOGIC ABO: CPT

## 2025-04-17 PROCEDURE — 93657 TX L/R ATRIAL FIB ADDL: CPT | Performed by: INTERNAL MEDICINE

## 2025-04-17 PROCEDURE — 93010 ELECTROCARDIOGRAM REPORT: CPT | Performed by: INTERNAL MEDICINE

## 2025-04-17 PROCEDURE — 2500000003 HC RX 250 WO HCPCS

## 2025-04-17 PROCEDURE — 3700000000 HC ANESTHESIA ATTENDED CARE: Performed by: INTERNAL MEDICINE

## 2025-04-17 PROCEDURE — 02583ZZ DESTRUCTION OF CONDUCTION MECHANISM, PERCUTANEOUS APPROACH: ICD-10-PCS | Performed by: INTERNAL MEDICINE

## 2025-04-17 PROCEDURE — 76937 US GUIDE VASCULAR ACCESS: CPT | Performed by: INTERNAL MEDICINE

## 2025-04-17 PROCEDURE — 4A023FZ MEASUREMENT OF CARDIAC RHYTHM, PERCUTANEOUS APPROACH: ICD-10-PCS | Performed by: INTERNAL MEDICINE

## 2025-04-17 PROCEDURE — 85347 COAGULATION TIME ACTIVATED: CPT

## 2025-04-17 PROCEDURE — 3700000001 HC ADD 15 MINUTES (ANESTHESIA): Performed by: INTERNAL MEDICINE

## 2025-04-17 RX ORDER — VALSARTAN 160 MG/1
160 TABLET ORAL DAILY
Status: DISCONTINUED | OUTPATIENT
Start: 2025-04-18 | End: 2025-04-18 | Stop reason: HOSPADM

## 2025-04-17 RX ORDER — AMLODIPINE BESYLATE 5 MG/1
2.5 TABLET ORAL DAILY
Status: DISCONTINUED | OUTPATIENT
Start: 2025-04-17 | End: 2025-04-18 | Stop reason: HOSPADM

## 2025-04-17 RX ORDER — CETIRIZINE HYDROCHLORIDE 10 MG/1
10 TABLET ORAL DAILY
Status: DISCONTINUED | OUTPATIENT
Start: 2025-04-17 | End: 2025-04-18 | Stop reason: HOSPADM

## 2025-04-17 RX ORDER — PANTOPRAZOLE SODIUM 40 MG/1
40 TABLET, DELAYED RELEASE ORAL
Status: DISCONTINUED | OUTPATIENT
Start: 2025-04-17 | End: 2025-04-18 | Stop reason: HOSPADM

## 2025-04-17 RX ORDER — SODIUM CHLORIDE 0.9 % (FLUSH) 0.9 %
5-40 SYRINGE (ML) INJECTION PRN
Status: DISCONTINUED | OUTPATIENT
Start: 2025-04-17 | End: 2025-04-17 | Stop reason: HOSPADM

## 2025-04-17 RX ORDER — KETOROLAC TROMETHAMINE 30 MG/ML
15 INJECTION, SOLUTION INTRAMUSCULAR; INTRAVENOUS EVERY 6 HOURS PRN
Status: DISCONTINUED | OUTPATIENT
Start: 2025-04-17 | End: 2025-04-18 | Stop reason: HOSPADM

## 2025-04-17 RX ORDER — HEPARIN SODIUM 1000 [USP'U]/ML
INJECTION, SOLUTION INTRAVENOUS; SUBCUTANEOUS
Status: DISCONTINUED | OUTPATIENT
Start: 2025-04-17 | End: 2025-04-17 | Stop reason: SDUPTHER

## 2025-04-17 RX ORDER — SUCCINYLCHOLINE/SOD CL,ISO/PF 200MG/10ML
SYRINGE (ML) INTRAVENOUS
Status: DISCONTINUED | OUTPATIENT
Start: 2025-04-17 | End: 2025-04-17 | Stop reason: SDUPTHER

## 2025-04-17 RX ORDER — SODIUM CHLORIDE 0.9 % (FLUSH) 0.9 %
5-40 SYRINGE (ML) INJECTION EVERY 12 HOURS SCHEDULED
Status: DISCONTINUED | OUTPATIENT
Start: 2025-04-17 | End: 2025-04-18 | Stop reason: HOSPADM

## 2025-04-17 RX ORDER — PROTAMINE SULFATE 10 MG/ML
INJECTION, SOLUTION INTRAVENOUS
Status: DISCONTINUED | OUTPATIENT
Start: 2025-04-17 | End: 2025-04-17 | Stop reason: SDUPTHER

## 2025-04-17 RX ORDER — FENTANYL CITRATE 50 UG/ML
INJECTION, SOLUTION INTRAMUSCULAR; INTRAVENOUS
Status: DISCONTINUED | OUTPATIENT
Start: 2025-04-17 | End: 2025-04-17 | Stop reason: SDUPTHER

## 2025-04-17 RX ORDER — ROCURONIUM BROMIDE 10 MG/ML
INJECTION, SOLUTION INTRAVENOUS
Status: DISCONTINUED | OUTPATIENT
Start: 2025-04-17 | End: 2025-04-17 | Stop reason: SDUPTHER

## 2025-04-17 RX ORDER — MIDAZOLAM HYDROCHLORIDE 1 MG/ML
INJECTION, SOLUTION INTRAMUSCULAR; INTRAVENOUS
Status: DISCONTINUED | OUTPATIENT
Start: 2025-04-17 | End: 2025-04-17 | Stop reason: SDUPTHER

## 2025-04-17 RX ORDER — SODIUM CHLORIDE 0.9 % (FLUSH) 0.9 %
5-40 SYRINGE (ML) INJECTION PRN
Status: DISCONTINUED | OUTPATIENT
Start: 2025-04-17 | End: 2025-04-18 | Stop reason: HOSPADM

## 2025-04-17 RX ORDER — SODIUM CHLORIDE 0.9 % (FLUSH) 0.9 %
5-40 SYRINGE (ML) INJECTION EVERY 12 HOURS SCHEDULED
Status: DISCONTINUED | OUTPATIENT
Start: 2025-04-17 | End: 2025-04-17 | Stop reason: HOSPADM

## 2025-04-17 RX ORDER — SODIUM CHLORIDE 9 MG/ML
INJECTION, SOLUTION INTRAVENOUS
Status: DISCONTINUED | OUTPATIENT
Start: 2025-04-17 | End: 2025-04-17 | Stop reason: SDUPTHER

## 2025-04-17 RX ORDER — ONDANSETRON 2 MG/ML
INJECTION INTRAMUSCULAR; INTRAVENOUS
Status: DISCONTINUED | OUTPATIENT
Start: 2025-04-17 | End: 2025-04-17 | Stop reason: SDUPTHER

## 2025-04-17 RX ORDER — PHENYLEPHRINE HCL IN 0.9% NACL 0.4MG/10ML
SYRINGE (ML) INTRAVENOUS
Status: DISCONTINUED | OUTPATIENT
Start: 2025-04-17 | End: 2025-04-17 | Stop reason: SDUPTHER

## 2025-04-17 RX ORDER — PANTOPRAZOLE SODIUM 40 MG/1
40 TABLET, DELAYED RELEASE ORAL
Qty: 60 TABLET | Refills: 0 | Status: SHIPPED | OUTPATIENT
Start: 2025-04-17

## 2025-04-17 RX ORDER — ACETAMINOPHEN 325 MG/1
650 TABLET ORAL EVERY 4 HOURS PRN
Status: DISCONTINUED | OUTPATIENT
Start: 2025-04-17 | End: 2025-04-18 | Stop reason: HOSPADM

## 2025-04-17 RX ORDER — CARVEDILOL 3.12 MG/1
3.12 TABLET ORAL 2 TIMES DAILY WITH MEALS
Status: DISCONTINUED | OUTPATIENT
Start: 2025-04-17 | End: 2025-04-18 | Stop reason: HOSPADM

## 2025-04-17 RX ORDER — ALBUTEROL SULFATE 0.83 MG/ML
2.5 SOLUTION RESPIRATORY (INHALATION) EVERY 4 HOURS PRN
Status: DISCONTINUED | OUTPATIENT
Start: 2025-04-17 | End: 2025-04-18 | Stop reason: HOSPADM

## 2025-04-17 RX ORDER — DEXAMETHASONE SODIUM PHOSPHATE 4 MG/ML
INJECTION, SOLUTION INTRA-ARTICULAR; INTRALESIONAL; INTRAMUSCULAR; INTRAVENOUS; SOFT TISSUE
Status: DISCONTINUED | OUTPATIENT
Start: 2025-04-17 | End: 2025-04-17 | Stop reason: SDUPTHER

## 2025-04-17 RX ORDER — LIDOCAINE HYDROCHLORIDE 20 MG/ML
INJECTION, SOLUTION EPIDURAL; INFILTRATION; INTRACAUDAL; PERINEURAL
Status: DISCONTINUED | OUTPATIENT
Start: 2025-04-17 | End: 2025-04-17 | Stop reason: SDUPTHER

## 2025-04-17 RX ORDER — SODIUM CHLORIDE 9 MG/ML
INJECTION, SOLUTION INTRAVENOUS PRN
Status: DISCONTINUED | OUTPATIENT
Start: 2025-04-17 | End: 2025-04-17 | Stop reason: HOSPADM

## 2025-04-17 RX ORDER — PROPOFOL 10 MG/ML
INJECTION, EMULSION INTRAVENOUS
Status: DISCONTINUED | OUTPATIENT
Start: 2025-04-17 | End: 2025-04-17 | Stop reason: SDUPTHER

## 2025-04-17 RX ADMIN — Medication 140 MG: at 10:24

## 2025-04-17 RX ADMIN — SODIUM CHLORIDE, PRESERVATIVE FREE 10 ML: 5 INJECTION INTRAVENOUS at 22:57

## 2025-04-17 RX ADMIN — PROTAMINE SULFATE 40 MG: 10 INJECTION, SOLUTION INTRAVENOUS at 13:00

## 2025-04-17 RX ADMIN — CETIRIZINE HYDROCHLORIDE 10 MG: 10 TABLET, FILM COATED ORAL at 18:11

## 2025-04-17 RX ADMIN — HEPARIN SODIUM 14000 UNITS: 1000 INJECTION INTRAVENOUS; SUBCUTANEOUS at 10:51

## 2025-04-17 RX ADMIN — PANTOPRAZOLE SODIUM 40 MG: 40 TABLET, DELAYED RELEASE ORAL at 17:27

## 2025-04-17 RX ADMIN — HEPARIN SODIUM 2000 UNITS: 1000 INJECTION INTRAVENOUS; SUBCUTANEOUS at 11:07

## 2025-04-17 RX ADMIN — Medication 80 MCG: at 12:00

## 2025-04-17 RX ADMIN — FENTANYL CITRATE 100 MCG: 50 INJECTION INTRAMUSCULAR; INTRAVENOUS at 10:23

## 2025-04-17 RX ADMIN — Medication 80 MCG: at 12:51

## 2025-04-17 RX ADMIN — ONDANSETRON 4 MG: 2 INJECTION INTRAMUSCULAR; INTRAVENOUS at 13:01

## 2025-04-17 RX ADMIN — LIDOCAINE HYDROCHLORIDE 100 MG: 20 INJECTION, SOLUTION EPIDURAL; INFILTRATION; INTRACAUDAL; PERINEURAL at 10:23

## 2025-04-17 RX ADMIN — PHENYLEPHRINE HYDROCHLORIDE 40 MCG/MIN: 10 INJECTION INTRAVENOUS at 10:26

## 2025-04-17 RX ADMIN — MIDAZOLAM 2 MG: 1 INJECTION INTRAMUSCULAR; INTRAVENOUS at 10:20

## 2025-04-17 RX ADMIN — HEPARIN SODIUM 2000 UNITS: 1000 INJECTION INTRAVENOUS; SUBCUTANEOUS at 11:31

## 2025-04-17 RX ADMIN — SODIUM CHLORIDE: 9 INJECTION, SOLUTION INTRAVENOUS at 10:06

## 2025-04-17 RX ADMIN — RIVAROXABAN 20 MG: 20 TABLET, FILM COATED ORAL at 18:12

## 2025-04-17 RX ADMIN — CARVEDILOL 3.12 MG: 3.12 TABLET, FILM COATED ORAL at 17:27

## 2025-04-17 RX ADMIN — SODIUM CHLORIDE: 9 INJECTION, SOLUTION INTRAVENOUS at 12:00

## 2025-04-17 RX ADMIN — ROCURONIUM BROMIDE 5 MG: 10 SOLUTION INTRAVENOUS at 10:23

## 2025-04-17 RX ADMIN — HEPARIN SODIUM 3000 UNITS: 1000 INJECTION INTRAVENOUS; SUBCUTANEOUS at 12:09

## 2025-04-17 RX ADMIN — AMLODIPINE BESYLATE 2.5 MG: 5 TABLET ORAL at 18:11

## 2025-04-17 RX ADMIN — PROPOFOL 200 MG: 10 INJECTION, EMULSION INTRAVENOUS at 10:23

## 2025-04-17 RX ADMIN — PROPOFOL 50 MG: 10 INJECTION, EMULSION INTRAVENOUS at 12:48

## 2025-04-17 RX ADMIN — PROTAMINE SULFATE 10 MG: 10 INJECTION, SOLUTION INTRAVENOUS at 12:59

## 2025-04-17 RX ADMIN — DEXAMETHASONE SODIUM PHOSPHATE 4 MG: 4 INJECTION INTRA-ARTICULAR; INTRALESIONAL; INTRAMUSCULAR; INTRAVENOUS; SOFT TISSUE at 10:31

## 2025-04-17 NOTE — PROGRESS NOTES
0810  Cardiac Cath Lab Recovery Arrival Note:      Carlos Domingo arrived to Cardiac Cath Lab, Recovery Area. Staff introduced to patient. Patient identifiers verified with NAME and DATE OF BIRTH. Procedure verified with patient. Consent forms reviewed and signed by patient or authorized representative and verified. Allergies verified.     Patient and family oriented to department. Patient and family informed of procedure and plan of care.     Questions answered with review. Patient prepped for procedure, per orders from physician, prior to arrival.    Patient on cardiac monitor, non-invasive blood pressure, SPO2 monitor. On room air. Patient is A&Ox 4. Patient reports no complaints.     Patient in stretcher, in low position, with side rails up, call bell within reach, patient instructed to call if assistance as needed.    Patient prep in: [facility] Recovery Area, [place] FT 2.   Patient family has pager # Gina (mom) [phone]   Family in: hospital waiting.   Prep by: VENKATESH Bolanos  Assisted by: VENKATESH Jarquin

## 2025-04-17 NOTE — PROGRESS NOTES
Cardiac Cath Lab Procedure Area Arrival Note:    Carlos Domingo arrived to Cardiac Cath Lab, Procedure Area. Patient identifiers verified with NAME and DATE OF BIRTH. Procedure verified with patient. Consent forms verified. Allergies verified. Patient informed of procedure and plan of care. Questions answered with review. Patient voiced understanding of procedure and plan of care.    Patient on cardiac monitor, non-invasive blood pressure, SPO2 monitor. On room air. Anesthesia team present during procedure to manage medications, fluids and airway.   Patient status doing well without problems. Patient is A&Ox 4. Patient reports no complaints.     Patient medicated during procedure with orders obtained and verified by Dr. Cervantes.    Refer to patients Cardiac Cath Lab PROCEDURE REPORT for vital signs, assessment, status, and response during procedure, printed at end of case. Printed report on chart or scanned into chart.

## 2025-04-17 NOTE — PROGRESS NOTES
.TRANSFER - OUT Cardiac Cath RR REPORT:    Verbal report given to Jami RN on Carlos Domingo  being transferred to  for routine progression of patient care       Report consisted of patient's Situation, Background, Assessment and   Recommendations(SBAR).     Information from the following report(s) Nurse Handoff Report, Surgery Report, Intake/Output, MAR, Recent Results, Med Rec Status, and Cardiac Rhythm SR  was reviewed with the receiving nurse.  12 lead EKG done post procedure  [place] Assessment: No data recorded  Lines:   Peripheral IV 04/17/25 Left Forearm (Active)   Site Assessment Clean, dry & intact 04/17/25 0815   Line Status Infusing 04/17/25 0815   Line Care Connections checked and tightened 04/17/25 0815   Phlebitis Assessment No symptoms 04/17/25 0815   Infiltration Assessment 0 04/17/25 0815   Alcohol Cap Used Yes 04/17/25 0815   Dressing Status New dressing applied 04/17/25 0815   Dressing Type Transparent 04/17/25 0815   Dressing Intervention New 04/17/25 0815       Peripheral IV 04/17/25 Proximal;Right Forearm (Active)   Site Assessment Clean, dry & intact 04/17/25 0815   Line Status Capped 04/17/25 0815   Line Care Connections checked and tightened 04/17/25 0815   Phlebitis Assessment No symptoms 04/17/25 0815   Infiltration Assessment 0 04/17/25 0815   Alcohol Cap Used Yes 04/17/25 0815   Dressing Status New dressing applied 04/17/25 0815   Dressing Type Transparent 04/17/25 0815   Dressing Intervention New 04/17/25 0815        Opportunity for questions and clarification was provided.    1645  Patient transported with:  Monitor and Registered Nurse

## 2025-04-17 NOTE — PROCEDURES
CLOCKWISE MITRAL ANNULAR ATYPICAL ATRIAL FLUTTER ABLATION  RADIOFREQUENCY ATRIAL FIBRILLATION ABLATION  SUBSTRATE MODIFICATION ABLATION    Procedure Date: 04/17/25   Lab Physician: Valarie Cervantes MD    Indications:  38 yo gentleman with a history of HTN, CARLOS, atrial fibrillation and atrial flutter s/p PFA PVI and CTIablation (2/22/24) now with recurrent atrial flutter s/p DCCV with recurrence on Amiodarone now referred for AF/AFL ablation.     SHEATH INSERTION  All sheaths were placed using the modified Seldinger technique with ultrasound guided assistance  Right Femoral Vein: 8.5Fr Agilis sheath  Left Femoral Vein: 8Fr and a 11F sheath    CATHETER INSERTION  Catheters were advanced to the following positions using fluoroscopic guidance:  Coronary Sinus: : [employer] Bidirectional Decapolar  LA: [employer] OctaRay Mapping catheter  Ablation: [employer] QDOT Micro ablation catheter  ICE in RA/RV: [employer] Intracardiac Ultrasound    PROCEDURE NARRATIVE:  EPS and RFA were discussed with the patient in great detail. The risks of bleeding, infection, vascular injury, pulmonary embolus, cardiac perforation, heart block necessitating pacemaker insertion, stroke, MI and death were among those discussed. Alternatives were reviewed including the option of no therapy. All questions were answered. The patient agreed to proceed. Written informed consent was obtained from the patient after a full explanation of the risks and benefits of the procedure. The patient was brought to the lab in the fasting state. Continuous electrocardiographic and hemodynamic monitoring was initiated. The patient was prepped and draped in the usual sterile fashion. General anesthesia with intubation and mechanical conventional ventilation was used. Anesthesia staff performed the intubation and monitoring during the case.  The Enso[employer]M esophageal cooling device was inserted post intubation. The device placement was confirm under fluoroscopy (with the tip of  LA.  4. Confirmation of persistent block from prior cavotricuspid isthmus ablation.  4. No acute PVs reconnection noted after a waiting period and with administration of Isuprel.  5. No inducible arrhythmia at the end of case with up to triple of atrial extrastimuli.    Plans:  1. Bedrest x 2 hours.  2. Observation to rule out post-procedure complications including CVA, CHF, and bleeding.  3. Continue systemic anticoagulation.  4. Continue home medications  5. PPI BID for 1 month.  6. Outpatient follow up with EP in 1 month with consideration for VOM with WM implantation vs. Tikosyn in the future

## 2025-04-17 NOTE — PROGRESS NOTES
EP LAB to Recovery Room Report    Procedure: A-Flutter Ablation    MD: JEFFREY Cervantes MD  Pre-procedure heart rhythm: Normal Sinus  Rhythm   Verbal Report given to Recovery Nurse on patient being transferred to Recovery Room for routine post-op. Patient stable upon transfer to .  Pt had general sedation with Anesthesia team, who managed MAR, vitals, and airway. Vitals, mental status, MAR, procedural summary discussed with recovery RN.     Patient cardioverted x1 during procedure.      Post-procedure heart rhythm: Normal Sinus  Rhythm      Sheaths:  Right femoral vein 8.5fr sheath removed at 1300 pm, closed with perclose.    Left femoral vein 8fr sheath removed at 1258 pm, closed with perclose.  Left femoral vein 11fr sheath removed at 1257 pm, closed with perclose.

## 2025-04-17 NOTE — DISCHARGE INSTRUCTIONS
Atrial Fibrillation Ablation   Discharge Instructions      You have just had an Atrial Fibrillation Ablation. Your main rhythm was clockwise mitral annular atypical atrial flutter.  There were catheters temporarily placed in your heart through a puncture in the veins and/or arteries in your groin.        WHAT TO EXPECT     If you have had an Atrial Fibrillation Ablation please be aware that you may experience mild chest pain that will resolve within 24-48 hours.  If the chest pain persists or becomes severe, please call the office.    Mild to moderate, non-painful, bruising or mild swelling at the puncture site is not uncommon; it should resolve in 7 - 14 days, and may extend down your thigh as it heals. Application of ice to the site may help with any tenderness.    You have a small gauze dressing applied to the puncture site in your groin.  You may remove this the following morning.     It is not uncommon to feel palpitations during the healing phase after your ablation. If you feel as though you are having recurrence of atrial fibrillation lasting longer than 30 minutes, please contact the office.    Palpitations/AFIB can occur during the healing phase (1-2 months) post ablation.      MEDICATIONS     Please stop your Amiodarone   Start Pantoprazole 40 mg twice daily x 30 days post ablation.  A prescription has been sent electronically to your pharmacy on record.  Please pick it up & take as directed.  Continue other medications as previously prescribed.      ACTIVITY     A responsible adult must take you home.  Do not drive a car for 24 hours.    Rest quietly for the remainder of the day.  Do not lift anything greater than 10 pounds for 7 days.  Limit bending at the puncture site and minimize use of stairs for at least 2 days.  You may remove the bandage and shower the morning after the procedure.  Do not take a bath for 3 days.        SYMPTOMS THAT NEED TO BE REPORTED IMMEDIATELY     Bleeding at the puncture  site.  If there is bleeding, lie down and hold firm direct pressure for at least 5 minutes.  If the bleeding does not stop, go to the closest emergency room, or call 911.    Temperature more than 100.5 F.  Redness or warmth at the puncture site.  Increasing pain, numbness, coolness or blue discoloration of the extremity where the puncture is located.  Pulsating mass at the puncture site.  A new “lump” at the puncture site, or increasing swelling at the site. Please be aware that a “pea” or “marble” sized lump is normal, anything larger or increasing in size should be reported.  Bruising at the puncture site that enlarges or becomes painful (some bruising at the site is common and will go away in 7-14 days).  Dizziness, lightheadedness, fainting.    REMEMBER: If you feel something is an emergency or cannot be handled over the phone, call 911 or go to the closest emergency room.      FOLLOW UP CARE     Follow up with MARK Montenegro on 5/20/2025 at 8:40 am.      Future Appointments   Date Time Provider Department Center   5/20/2025  8:40 AM Moni Juárez APRN - NP [facility] BS AMB   8/26/2025 11:40 AM Valarie Cervantes MD [facility] BS AMB   4/14/2026 10:00 AM Brigida Fernandez MD [facility] BS AMB         Cristino Cervantes MD  Cardiac Electrophysiology / Cardiology    [facility]  [facility]  [address] [address] [facility] [address], [address]   [address] [place] [address], [facility] [address]  [place], [place] [address]    [place], [place] [address] [phone] / [phone] Fax  [phone] / [phone] Fax

## 2025-04-17 NOTE — PROGRESS NOTES
TRANSFER - IN REPORT:    Verbal report received from Katja bob and CRNA on Carlos Domingo  being received from procedure for routine progression of patient care. Report consisted of patient’s Situation, Background, Assessment and Recommendations(SBAR). Information from the following report(s) Procedure Summary, Intake/Output, MAR, Accordion, Recent Results, Med Rec Status, and Cardiac Rhythm SR  was reviewed with the receiving clinician. Opportunity for questions and clarification was provided. Assessment completed upon patient’s arrival to Cardiac Cath Lab RECOVERY AREA and care assumed.       Cardiac Cath Lab Recovery Arrival Note:    Carlos Domingo arrived to CCL recovery area.  Patient procedure= EPS/ablation. Patient on cardiac monitor, non-invasive blood pressure, SPO2 monitor. On  O2 @ 2 lpm via n/c.  IV  of nacl on pump at 50 ml/hr. Patient status doing well without problems. Patient is A&Ox 4. Patient reports no complaints.    PROCEDURE SITE CHECK:    Procedure site:without any bleeding or hematoma, no pain/discomfort reported at procedure site.     No change in patient status. Continue to monitor patient and status.     1500 declined food tolerated ginger ale  Mother in to talk with pt  1530 Dr Cervantes in to talk with pt, to be admitted to hospital for observation  1600 waiting for 3N to receive report

## 2025-04-17 NOTE — PROGRESS NOTES
Rivaroxaban Monitoring  New start? No  Day of therapy: con't PTA regimen  Indication: A Fib/A Flutter   Dose: 20 mg every evening  Significant Dis (including other anticoagulants): N/A  (use not recommended with carbamazepine, phenytoin, rifampin, [place]'s Wort, ketoconazole, itraconazole, lopinavir/ritonavir, ritonavir, conivaptan, phenobarbital)    No results for input(s): \"HGB\", \"INR\", \"CREATININE\", \"PLT\" in the last 72 hours.  Est CrCl: 80 ml/min (from [facility] on 3/26, presuming currently at baseline. No labs ordered yet)  Plan: Continue current regimen  [facility] DOAC Dosing Guide    Franco Aguilar RPH

## 2025-04-17 NOTE — ANESTHESIA POSTPROCEDURE EVALUATION
Department of Anesthesiology  Postprocedure Note    Patient: Carlos Domingo  MRN: [id number]  YOB: 1985  Date of evaluation: 4/17/2025    Procedure Summary       Date: 04/17/25 Room / Location: [facility] CATH LAB 3 / [facility] CARDIAC CATH LAB    Anesthesia Start: 1018 Anesthesia Stop: 1327    Procedures:       Ablation A-fib w complete ep study      Intracardiac echocardiogram      Ep 3d mapping      Drug stimulation      Ultrasound guided vascular access Diagnosis:       PAF (paroxysmal atrial fibrillation) (HCC)      (PAF (paroxysmal atrial fibrillation) (HCC) [I48.0])    Providers: Valarie Cervantes MD Responsible Provider: Lon Marrero MD    Anesthesia Type: General ASA Status: 2            Anesthesia Type: General    Dennis Phase I: Dennis Score: 10    Dennis Phase II:      Anesthesia Post Evaluation    Patient location during evaluation: PACU  Patient participation: complete - patient participated  Level of consciousness: awake  Airway patency: patent  Nausea & Vomiting: no nausea  Cardiovascular status: hemodynamically stable  Respiratory status: acceptable  Hydration status: stable  Pain management: adequate    No notable events documented.

## 2025-04-18 VITALS
WEIGHT: 194 LBS | OXYGEN SATURATION: 96 % | RESPIRATION RATE: 16 BRPM | HEART RATE: 88 BPM | SYSTOLIC BLOOD PRESSURE: 147 MMHG | DIASTOLIC BLOOD PRESSURE: 97 MMHG | BODY MASS INDEX: 27.16 KG/M2 | HEIGHT: 71 IN | TEMPERATURE: 98.1 F

## 2025-04-18 PROCEDURE — 99238 HOSP IP/OBS DSCHRG MGMT 30/<: CPT

## 2025-04-18 PROCEDURE — G0378 HOSPITAL OBSERVATION PER HR: HCPCS

## 2025-04-18 PROCEDURE — 2500000003 HC RX 250 WO HCPCS

## 2025-04-18 PROCEDURE — 94760 N-INVAS EAR/PLS OXIMETRY 1: CPT

## 2025-04-18 PROCEDURE — 6370000000 HC RX 637 (ALT 250 FOR IP): Performed by: INTERNAL MEDICINE

## 2025-04-18 RX ORDER — CARVEDILOL 6.25 MG/1
6.25 TABLET ORAL 2 TIMES DAILY
Qty: 60 TABLET | Refills: 0 | Status: SHIPPED | OUTPATIENT
Start: 2025-04-18

## 2025-04-18 RX ADMIN — PANTOPRAZOLE SODIUM 40 MG: 40 TABLET, DELAYED RELEASE ORAL at 07:17

## 2025-04-18 RX ADMIN — CARVEDILOL 3.12 MG: 3.12 TABLET, FILM COATED ORAL at 08:10

## 2025-04-18 RX ADMIN — VALSARTAN 160 MG: 160 TABLET, FILM COATED ORAL at 08:08

## 2025-04-18 RX ADMIN — SODIUM CHLORIDE, PRESERVATIVE FREE 10 ML: 5 INJECTION INTRAVENOUS at 08:10

## 2025-04-18 NOTE — PLAN OF CARE
Problem: Discharge Planning  Goal: Discharge to home or other facility with appropriate resources  Outcome: Completed     Problem: Safety - Adult  Goal: Free from fall injury  4/18/2025 1004 by Jami Cedillo, RN  Outcome: Completed  4/18/2025 0640 by Lucien Perez, RN  Outcome: Progressing

## 2025-04-18 NOTE — DISCHARGE SUMMARY
Patient ID:  Carlos Domingo  [id number]  39 y.o.  1985    Admit date: 4/17/2025    Discharge date: 4/18/2025     Admitting Physician: Valarie Cervantes MD    Discharge Physician: Valarie Cervantes MD      PCP:  None, None    Admitting Diagnoses: PAF    Discharge Diagnoses:     1. PAF (paroxysmal atrial fibrillation) (HCC)        Discharged Condition: Good and Stable    Disposition: home, see patient instructions for treatment and plan    Procedures for this admission:  Procedure(s):  Ablation A-fib w complete ep study  Intracardiac echocardiogram  Ep 3d mapping  Drug stimulation  Ultrasound guided vascular access    Discharge Medications:      Medication List        START taking these medications      pantoprazole 40 MG tablet  Commonly known as: PROTONIX  Take 1 tablet by mouth 2 times daily (before meals)            CHANGE how you take these medications      carvedilol 6.25 MG tablet  Commonly known as: COREG  Take 1 tablet by mouth 2 times daily  What changed:   medication strength  how much to take            CONTINUE taking these medications      amLODIPine 2.5 MG tablet  Commonly known as: NORVASC  TAKE 1 TABLET BY MOUTH DAILY     cetirizine 10 MG tablet  Commonly known as: ZYRTEC     levalbuterol 45 MCG/ACT inhaler  Commonly known as: XOPENEX HFA     valsartan 160 MG tablet  Commonly known as: DIOVAN  TAKE 1 TABLET BY MOUTH EVERY DAY     vitamin D 25 MCG (1000 UT) Caps     Xarelto 20 MG Tabs tablet  Generic drug: rivaroxaban  TAKE 1 TABLET BY MOUTH DAILY WITH BREAKFAST            STOP taking these medications      amiodarone 200 MG tablet  Commonly known as: CORDARONE               Where to Get Your Medications        These medications were sent to [id number] - [place], [place] - [address] - P [phone] - F [phone] [address] [facility] [address]      Phone: [phone]   carvedilol 6.25 MG tablet  pantoprazole 40 MG tablet               HPI:    Hospital Course: Patient

## 2025-04-22 NOTE — PROGRESS NOTES
Physician Progress Note      PATIENT:               RAMILA CID  CSN #:                  [id number]  :                       1985  ADMIT DATE:       2025 7:43 AM  DISCH DATE:        2025 11:43 AM  RESPONDING  PROVIDER #:        Valarie Cervantes MD          QUERY TEXT:    Based on your medical judgment, please clarify these findings and document if   any of the following are being evaluated and/or treated:    The clinical indicators include:  \"Paroxysmal atrial fibrillation\" (DS Valarie Cervantes MD)    -rivaroxaban (XARELTO) 20 MG - 11/15/24, 25 - given (MAR)  Options provided:  -- Secondary hypercoagulable state in a patient with atrial fibrillation  -- Other - I will add my own diagnosis  -- Disagree - Not applicable / Not valid  -- Disagree - Clinically unable to determine / Unknown  -- Refer to Clinical Documentation Reviewer    PROVIDER RESPONSE TEXT:    This patient has secondary hypercoagulable state in a patient with atrial   fibrillation.    Query created by: Leida Berrios on 2025 4:28 PM      Electronically signed by:  Valarie Cervantes MD 2025 4:38 PM

## 2025-04-29 ENCOUNTER — TELEPHONE (OUTPATIENT)
Age: 40
End: 2025-04-29

## 2025-04-29 NOTE — TELEPHONE ENCOUNTER
Patient is calling because he is trying to line up the day and time he is suppose to go to the hospital to have an overnight stay.    Patient said he spoke with MARK Alonzo and  about this.Please give a call back.    [phone]

## 2025-04-29 NOTE — TELEPHONE ENCOUNTER
Valarie Cervantes MD Hi guys,    However, I do want to him to get scheduled for Tikosyn load. Plan:  - stop Amidoarone now  - add PPI x1 month  - has FU with Moni in 5/20  - he is interested in doing the Tikosyn over the weekend so that he doesn't miss work. Can we plan for a load on my call weekend, he has agreed to come in on 5/22, with plans for discharge on Saturday on 5/24/25. I should be on-call that weekend, with a different attending on for 5/25-5/26 (that's why I want him to be discharged on Sat.    Called patient, ID verified using two patient identifiers.  Discussed above.    Tentatively scheduled patient for direct admit Thursday, 5/22/25.  Advised Mr. Domingo I will send him instructions via [employer].    Future Appointments   Date Time Provider Department Center   5/20/2025  8:40 AM Moni Juárez, APRN - NP [facility] AMB   8/26/2025 11:40 AM Valarie Cervantes MD [facility] BS [facility]   4/14/2026 10:00 AM Brigida Fernandez MD [facility] BS AMB     Called [facility] - [phone], spoke with VENKATESH Beckett.  Patient scheduled for direct admission to [facility] on Thursday, 5/22/25 for Tikosyn loading.

## 2025-05-09 ENCOUNTER — TELEPHONE (OUTPATIENT)
Facility: CLINIC | Age: 40
End: 2025-05-09

## 2025-05-09 NOTE — TELEPHONE ENCOUNTER
----- Message from Fabrizio ESPINOSA sent at 5/9/2025 10:45 AM EDT -----  Regarding: ECC Appointment Request  ECC Appointment Request    Patient needs appointment for ECC Appointment Type: New Patient.    Patient Requested Dates(s): any next available appointment   Patient Requested Time:prefer morning or anytime   Provider Name: Johnathan Newman MD    Reason for Appointment Request: New Patient - Requested Provider unavailable  --------------------------------------------------------------------------------------------------------------------------    Relationship to Patient: Self     Call Back Information: OK to leave message on voicemail  Preferred Call Back Number: Phone : [phone]

## 2025-05-12 NOTE — PROGRESS NOTES
[facility] Cardiology  Cardiac Electrophysiology Clinic Care Note                  []Initial visit     [x]Established visit     Patient Name: Carlos Domingo - :1985 - MRN:[id number]  Primary Cardiologist: [facility]  Electrophysiologist: Valarie Cervantes MD     Reason for visit: PAF/persistent AFL follow up    HPI:  Mr. Domingo is a 39 y.o. male who presents for follow up, is s/p pulsed field ablation (PVI, GP, Ligament of Abdirashid) & confirmation of CTI block (2024-[facility] [place]) & PVI, anterior mitral isthmus, lateral mitral isthmus, endocardial & epicardial ablation within the CS, & anterior LA ablation; persistent CTI block confirmed (2025-[facility] [place]).    On carvedilol post ablation.  He denies any recurrent palpitations, chest pain, or ZAVALA.  Occasional lightheadedness in the mornings.    ECG today shows AFL 96 bpm with LBBB (QRSd 184 ms).    Holter at [facility] in 2024 reportedly showed 60% AF/AFL burden.    Holter in 2024 showed predominantly AF/AFL with HR  bpm, avg 90 bpm.    Echo in 2023 showed LVEF 55-60% with trace TR.    BP controlled.    Continues Xarelto, denies bleeding issues.      Previous:  S/p PVI, anterior mitral isthmus, lateral mitral isthmus, endocardial & epicardial ablation within the CS, & anterior LA ablation; persistent CTI block confirmed (2025-[facility] [place]).    S/p DCCV 2025.    S/p pulsed field ablation (PVI, GP, Ligament of Abdirashid) & confirmation of CTI block (2024-[facility] [place]).    Discontinued flecainide due to LBBB, still present after discontinuation.    Atrial flutter had left bundle aberrancy, likely due to flecainide.    CARLOS, uses dental appliance.       Assessment and Plan       ICD-10-CM    1. PAF (paroxysmal atrial fibrillation) (HCC)  I48.0 EKG 12 Lead      2. Typical atrial flutter (HCC)  I48.3 EKG 12 Lead      3. Anticoagulated  Z79.01       4. Primary hypertension  I10       5. CARLOS (obstructive sleep apnea)  G47.33

## 2025-05-13 RX ORDER — AMLODIPINE BESYLATE 2.5 MG/1
2.5 TABLET ORAL DAILY
Qty: 90 TABLET | Refills: 3 | Status: SHIPPED | OUTPATIENT
Start: 2025-05-13

## 2025-05-13 RX ORDER — CARVEDILOL 6.25 MG/1
6.25 TABLET ORAL 2 TIMES DAILY
Qty: 60 TABLET | Refills: 5 | Status: SHIPPED | OUTPATIENT
Start: 2025-05-13

## 2025-05-13 NOTE — TELEPHONE ENCOUNTER
Ordered Per  Verbal Order.     Last appt: 8/25/2023   Future Appointments   Date Time Provider Department Center   5/20/2025  8:40 AM Moni Juárez APRN - NP [facility]  AMB   8/12/2025 10:40 AM Kermit Carey MD [facility] DEP   8/26/2025 11:40 AM [facility]   4/14/2026 10:00 AM Brigida Fernandez MD [facility] BS AMB       Requested Prescriptions     Pending Prescriptions Disp Refills    rivaroxaban (XARELTO) 20 MG TABS tablet 90 tablet 1     Sig: Take 1 tablet by mouth daily         Prior labs and Blood pressures:  BP Readings from Last 3 Encounters:   04/18/25 (!) 147/97   04/10/25 (!) 143/85   03/26/25 132/86     Lab Results   Component Value Date/Time     03/26/2025 10:12 AM    K 4.2 03/26/2025 10:12 AM     03/26/2025 10:12 AM    CO2 27 03/26/2025 10:12 AM    BUN 19 03/26/2025 10:12 AM    GFRAA 106 11/04/2021 07:11 AM     No results found for: \"HBA1C\", \"[id number]\"  Lab Results   Component Value Date/Time    CHOL 253 12/09/2022 09:38 AM    HDL 44 12/09/2022 09:38 AM     12/09/2022 09:38 AM    VLDL 48 12/09/2022 09:38 AM    VLDL 74 11/04/2021 07:11 AM     No results found for: \"VITD3\"    Lab Results   Component Value Date/Time    TSH 2.64 02/07/2023 02:55 PM

## 2025-05-13 NOTE — TELEPHONE ENCOUNTER
Refill per VO of Dr. Cervantes  Last appt: 6/28/2024    Requested Prescriptions     Signed Prescriptions Disp Refills    amLODIPine (NORVASC) 2.5 MG tablet 90 tablet 3     Sig: TAKE 1 TABLET BY MOUTH DAILY     Authorizing Provider: YAHAIRA CERVANTES     Ordering User: VERONICA MADRIGAL       Future Appointments   Date Time Provider Department Center   5/20/2025  8:40 AM Moni Juárez APRN - NP [facility]  AMB   8/12/2025 10:40 AM Kermit Carey MD [facility]   8/26/2025 11:40 AM Yahaira Cervantes MD [facility] BS AMB   4/14/2026 10:00 AM Brigida Fernandez MD [facility] BS AMB

## 2025-05-13 NOTE — TELEPHONE ENCOUNTER
Ordered Per  Verbal Order.     Last appt: 3/26/2025   Future Appointments   Date Time Provider Department Center   5/20/2025  8:40 AM Moni Juárez APRN - NP [facility]  AMB   8/12/2025 10:40 AM Kermit Carey MD [facility] DEP   8/26/2025 11:40 AM [facility]   4/14/2026 10:00 AM Brigida Fernandez MD [facility] BS AMB       Requested Prescriptions     Pending Prescriptions Disp Refills    carvedilol (COREG) 6.25 MG tablet [Pharmacy Med Name: CARVEDILOL 6.25MG TABLETS] 60 tablet 0     Sig: TAKE 1 TABLET BY MOUTH TWICE DAILY         Prior labs and Blood pressures:  BP Readings from Last 3 Encounters:   04/18/25 (!) 147/97   04/10/25 (!) 143/85   03/26/25 132/86     Lab Results   Component Value Date/Time     03/26/2025 10:12 AM    K 4.2 03/26/2025 10:12 AM     03/26/2025 10:12 AM    CO2 27 03/26/2025 10:12 AM    BUN 19 03/26/2025 10:12 AM    GFRAA 106 11/04/2021 07:11 AM     No results found for: \"HBA1C\", \"[id number]\"  Lab Results   Component Value Date/Time    CHOL 253 12/09/2022 09:38 AM    HDL 44 12/09/2022 09:38 AM     12/09/2022 09:38 AM    VLDL 48 12/09/2022 09:38 AM    VLDL 74 11/04/2021 07:11 AM     No results found for: \"VITD3\"    Lab Results   Component Value Date/Time    TSH 2.64 02/07/2023 02:55 PM

## 2025-05-20 ENCOUNTER — OFFICE VISIT (OUTPATIENT)
Age: 40
End: 2025-05-20
Payer: COMMERCIAL

## 2025-05-20 VITALS
SYSTOLIC BLOOD PRESSURE: 128 MMHG | WEIGHT: 199.1 LBS | DIASTOLIC BLOOD PRESSURE: 90 MMHG | HEART RATE: 76 BPM | BODY MASS INDEX: 27.77 KG/M2 | OXYGEN SATURATION: 99 %

## 2025-05-20 DIAGNOSIS — Z79.01 ANTICOAGULATED: ICD-10-CM

## 2025-05-20 DIAGNOSIS — Z98.890 S/P ABLATION OF ATRIAL FIBRILLATION: ICD-10-CM

## 2025-05-20 DIAGNOSIS — I48.0 PAF (PAROXYSMAL ATRIAL FIBRILLATION) (HCC): Primary | ICD-10-CM

## 2025-05-20 DIAGNOSIS — Z86.79 S/P ABLATION OF ATRIAL FIBRILLATION: ICD-10-CM

## 2025-05-20 DIAGNOSIS — G47.33 OSA (OBSTRUCTIVE SLEEP APNEA): ICD-10-CM

## 2025-05-20 DIAGNOSIS — I10 PRIMARY HYPERTENSION: ICD-10-CM

## 2025-05-20 DIAGNOSIS — I48.3 TYPICAL ATRIAL FLUTTER (HCC): ICD-10-CM

## 2025-05-20 DIAGNOSIS — Z86.79 S/P ABLATION OF ATRIAL FLUTTER: ICD-10-CM

## 2025-05-20 DIAGNOSIS — Z98.890 S/P ABLATION OF ATRIAL FLUTTER: ICD-10-CM

## 2025-05-20 PROCEDURE — 99214 OFFICE O/P EST MOD 30 MIN: CPT | Performed by: NURSE PRACTITIONER

## 2025-05-20 PROCEDURE — 3080F DIAST BP >= 90 MM HG: CPT | Performed by: NURSE PRACTITIONER

## 2025-05-20 PROCEDURE — 93000 ELECTROCARDIOGRAM COMPLETE: CPT | Performed by: NURSE PRACTITIONER

## 2025-05-20 PROCEDURE — 3074F SYST BP LT 130 MM HG: CPT | Performed by: NURSE PRACTITIONER

## 2025-05-20 NOTE — PATIENT INSTRUCTIONS
Here are your instructions for your Tikosyn admission.       When:   Thursday, 5/22/25 at [facility].     Where:  [facility], [address] [place] [address], [place] [address]     Arrival:  Tentatively plan to arrive at 7:30 am and check in at outpatient registration near the main entrance.  [facility] will call you prior to your arrival to notify you of your bed assignment.     What to bring:  - List of your current medications  - Comfortable clothing  - phone   - something to occupy your time (ex: books, tablet, crossword puzzles)

## 2025-05-22 ENCOUNTER — HOSPITAL ENCOUNTER (INPATIENT)
Facility: HOSPITAL | Age: 40
LOS: 2 days | Discharge: HOME | End: 2025-05-24
Attending: INTERNAL MEDICINE | Admitting: INTERNAL MEDICINE
Payer: COMMERCIAL

## 2025-05-22 DIAGNOSIS — I48.0 PAF (PAROXYSMAL ATRIAL FIBRILLATION) (HCC): Primary | ICD-10-CM

## 2025-05-22 PROBLEM — Z79.899 VISIT FOR MONITORING TIKOSYN THERAPY: Status: ACTIVE | Noted: 2025-05-22

## 2025-05-22 PROBLEM — Z51.81 VISIT FOR MONITORING TIKOSYN THERAPY: Status: ACTIVE | Noted: 2025-05-22

## 2025-05-22 LAB
ANION GAP SERPL CALC-SCNC: 4 MMOL/L (ref 2–12)
BASOPHILS # BLD: 0.04 K/UL (ref 0–0.1)
BASOPHILS NFR BLD: 1 % (ref 0–1)
BUN SERPL-MCNC: 25 MG/DL (ref 6–20)
BUN/CREAT SERPL: 19 (ref 12–20)
CALCIUM SERPL-MCNC: 9.4 MG/DL (ref 8.5–10.1)
CHLORIDE SERPL-SCNC: 105 MMOL/L (ref 97–108)
CO2 SERPL-SCNC: 29 MMOL/L (ref 21–32)
CREAT SERPL-MCNC: 1.34 MG/DL (ref 0.7–1.3)
DIFFERENTIAL METHOD BLD: ABNORMAL
EKG ATRIAL RATE: 284 BPM
EKG ATRIAL RATE: 312 BPM
EKG ATRIAL RATE: 70 BPM
EKG DIAGNOSIS: NORMAL
EKG P AXIS: 79 DEGREES
EKG P AXIS: 83 DEGREES
EKG P AXIS: 88 DEGREES
EKG P-R INTERVAL: 180 MS
EKG Q-T INTERVAL: 430 MS
EKG Q-T INTERVAL: 440 MS
EKG Q-T INTERVAL: 455 MS
EKG QRS DURATION: 170 MS
EKG QRS DURATION: 182 MS
EKG QRS DURATION: 182 MS
EKG QTC CALCULATION (BAZETT): 479 MS
EKG QTC CALCULATION (BAZETT): 490 MS
EKG QTC CALCULATION (BAZETT): 491 MS
EKG R AXIS: 151 DEGREES
EKG R AXIS: 29 DEGREES
EKG R AXIS: 90 DEGREES
EKG T AXIS: -76 DEGREES
EKG T AXIS: 166 DEGREES
EKG T AXIS: 27 DEGREES
EKG VENTRICULAR RATE: 70 BPM
EKG VENTRICULAR RATE: 71 BPM
EKG VENTRICULAR RATE: 78 BPM
EOSINOPHIL # BLD: 0.11 K/UL (ref 0–0.4)
EOSINOPHIL NFR BLD: 2.7 % (ref 0–7)
ERYTHROCYTE [DISTWIDTH] IN BLOOD BY AUTOMATED COUNT: 12.4 % (ref 11.5–14.5)
GLUCOSE SERPL-MCNC: 78 MG/DL (ref 65–100)
HCT VFR BLD AUTO: 38.3 % (ref 36.6–50.3)
HGB BLD-MCNC: 11.9 G/DL (ref 12.1–17)
IMM GRANULOCYTES # BLD AUTO: 0.01 K/UL (ref 0–0.04)
IMM GRANULOCYTES NFR BLD AUTO: 0.2 % (ref 0–0.5)
LYMPHOCYTES # BLD: 0.86 K/UL (ref 0.8–3.5)
LYMPHOCYTES NFR BLD: 21.3 % (ref 12–49)
MAGNESIUM SERPL-MCNC: 2 MG/DL (ref 1.6–2.4)
MCH RBC QN AUTO: 24.6 PG (ref 26–34)
MCHC RBC AUTO-ENTMCNC: 31.1 G/DL (ref 30–36.5)
MCV RBC AUTO: 79.3 FL (ref 80–99)
MONOCYTES # BLD: 0.5 K/UL (ref 0–1)
MONOCYTES NFR BLD: 12.4 % (ref 5–13)
NEUTS SEG # BLD: 2.51 K/UL (ref 1.8–8)
NEUTS SEG NFR BLD: 62.4 % (ref 32–75)
NRBC # BLD: 0 K/UL (ref 0–0.01)
NRBC BLD-RTO: 0 PER 100 WBC
PLATELET # BLD AUTO: 237 K/UL (ref 150–400)
PMV BLD AUTO: 11.3 FL (ref 8.9–12.9)
POTASSIUM SERPL-SCNC: 4.3 MMOL/L (ref 3.5–5.1)
RBC # BLD AUTO: 4.83 M/UL (ref 4.1–5.7)
SODIUM SERPL-SCNC: 138 MMOL/L (ref 136–145)
WBC # BLD AUTO: 4 K/UL (ref 4.1–11.1)

## 2025-05-22 PROCEDURE — 80048 BASIC METABOLIC PNL TOTAL CA: CPT

## 2025-05-22 PROCEDURE — 2060000000 HC ICU INTERMEDIATE R&B

## 2025-05-22 PROCEDURE — 85025 COMPLETE CBC W/AUTO DIFF WBC: CPT

## 2025-05-22 PROCEDURE — 99223 1ST HOSP IP/OBS HIGH 75: CPT | Performed by: HOSPITALIST

## 2025-05-22 PROCEDURE — 93005 ELECTROCARDIOGRAM TRACING: CPT | Performed by: HOSPITALIST

## 2025-05-22 PROCEDURE — 93005 ELECTROCARDIOGRAM TRACING: CPT | Performed by: INTERNAL MEDICINE

## 2025-05-22 PROCEDURE — 93005 ELECTROCARDIOGRAM TRACING: CPT

## 2025-05-22 PROCEDURE — 83735 ASSAY OF MAGNESIUM: CPT

## 2025-05-22 PROCEDURE — 6370000000 HC RX 637 (ALT 250 FOR IP)

## 2025-05-22 RX ORDER — LANOLIN ALCOHOL/MO/W.PET/CERES
400 CREAM (GRAM) TOPICAL 2 TIMES DAILY
Status: DISCONTINUED | OUTPATIENT
Start: 2025-05-22 | End: 2025-05-24 | Stop reason: HOSPADM

## 2025-05-22 RX ORDER — CARVEDILOL 6.25 MG/1
6.25 TABLET ORAL 2 TIMES DAILY WITH MEALS
Status: DISCONTINUED | OUTPATIENT
Start: 2025-05-22 | End: 2025-05-24 | Stop reason: HOSPADM

## 2025-05-22 RX ORDER — DOFETILIDE 0.5 MG/1
500 CAPSULE ORAL EVERY 12 HOURS SCHEDULED
Status: DISCONTINUED | OUTPATIENT
Start: 2025-05-22 | End: 2025-05-22

## 2025-05-22 RX ORDER — VALSARTAN 160 MG/1
160 TABLET ORAL DAILY
Status: DISCONTINUED | OUTPATIENT
Start: 2025-05-22 | End: 2025-05-24 | Stop reason: HOSPADM

## 2025-05-22 RX ORDER — DOFETILIDE 0.5 MG/1
500 CAPSULE ORAL EVERY 12 HOURS SCHEDULED
Status: DISCONTINUED | OUTPATIENT
Start: 2025-05-22 | End: 2025-05-23

## 2025-05-22 RX ORDER — ALBUTEROL SULFATE 0.83 MG/ML
2.5 SOLUTION RESPIRATORY (INHALATION) EVERY 6 HOURS PRN
Status: DISCONTINUED | OUTPATIENT
Start: 2025-05-22 | End: 2025-05-24 | Stop reason: HOSPADM

## 2025-05-22 RX ORDER — CETIRIZINE HYDROCHLORIDE 10 MG/1
10 TABLET ORAL DAILY
Status: DISCONTINUED | OUTPATIENT
Start: 2025-05-22 | End: 2025-05-24 | Stop reason: HOSPADM

## 2025-05-22 RX ORDER — AMLODIPINE BESYLATE 5 MG/1
2.5 TABLET ORAL DAILY
Status: DISCONTINUED | OUTPATIENT
Start: 2025-05-22 | End: 2025-05-24 | Stop reason: HOSPADM

## 2025-05-22 RX ORDER — PANTOPRAZOLE SODIUM 40 MG/1
40 TABLET, DELAYED RELEASE ORAL
Status: DISCONTINUED | OUTPATIENT
Start: 2025-05-22 | End: 2025-05-24 | Stop reason: HOSPADM

## 2025-05-22 RX ADMIN — AMLODIPINE BESYLATE 2.5 MG: 5 TABLET ORAL at 20:29

## 2025-05-22 RX ADMIN — CETIRIZINE HYDROCHLORIDE 10 MG: 10 TABLET, FILM COATED ORAL at 20:29

## 2025-05-22 RX ADMIN — CARVEDILOL 6.25 MG: 6.25 TABLET, FILM COATED ORAL at 18:52

## 2025-05-22 RX ADMIN — DOFETILIDE 500 MCG: 0.5 CAPSULE ORAL at 11:13

## 2025-05-22 RX ADMIN — DOFETILIDE 500 MCG: 0.5 CAPSULE ORAL at 22:31

## 2025-05-22 RX ADMIN — Medication 400 MG: at 11:13

## 2025-05-22 RX ADMIN — Medication 400 MG: at 20:29

## 2025-05-22 NOTE — H&P
[facility] CARDIOLOGY  Cardiac Electrophysiology Note  ATTESTATION    I have seen, interviewed, and examined the patient.  I agree with the history, exam, and was involved in the formulation of the assessment and plan as detailed in the Cardiology/Cardiac Electrophysiology consultation documentation composed today by the Advance Practice Provider (JORGE, NP, PA). Please see the JORGE’s note for full details, below includes any additional revisions. I have performed the exam and medical decision making portions in its entirety. I personally reviewed the relevant data including labs and imaging.         Assessment and Plan     Principal Problem:    Visit for monitoring Tikosyn therapy  Resolved Problems:    * No resolved hospital problems. *      # Persistent atypical atrial flutter -mitral atrial flutter s/p anterior mitral line and lateral mitral line ablation with recurrence  # History of persistent atrial fibrillation s/p ablation  # Chronic Left bundle branch block  # HTN  # Chronic Kidney Disease Stage Stage II    Lab Results   Component Value Date    CREATININE 1.34 (H) 05/22/2025    CREATININE 1.40 (H) 03/26/2025      Estimated Creatinine Clearance: 77 mL/min (A) (based on SCr of 1.34 mg/dL (H)).      EKGs reviewed showed atypical atrial flutter.  He has left bundle branch block.  Discussed in detail risk and benefits of Tikosyn and he agrees to start.  This was plan arranged by Dr. Cervantes who primary EP.    --He converted to sinus after first dose of Tikosyn  -- QT interval is acceptable so we will continue  -- Given left bundle branch block with wide  ms, QTc interval limit will be up to 560 ms  -- Continue Xarelto  --Rest as per NP note below.    # High Risk Medication Use - Tikosyn  We discussed in great detail regarding high risk medication management and the associated risks of antiarrhythmic therapy.  Patient reports full understanding of recommendations. We discussed that Tikosyn (Dofetilide) may

## 2025-05-22 NOTE — CARE COORDINATION
Care Management Initial Assessment       RUR: 8% - low  Readmission? No  1st IM letter given? No  1st  letter given: No    Patient is independent without DME at baseline. Resides at address on file with his wife. Admitted for Tikosyn administration. No CM needs identified for discharge.       05/22/25 1404   Service Assessment   Patient Orientation Alert and Oriented   Cognition Alert   History Provided By Patient   Primary Caregiver Self   Support Systems Spouse/Significant Other   Patient's Healthcare Decision Maker is: Legal Next of Kin   PCP Verified by CM Yes   Last Visit to PCP Within last 3 months   Prior Functional Level Independent in ADLs/IADLs   Current Functional Level Independent in ADLs/IADLs   Can patient return to prior living arrangement Yes   Ability to make needs known: Good   Family able to assist with home care needs: Yes   Would you like for me to discuss the discharge plan with any other family members/significant others, and if so, who? No   Financial Resources Other (Comment)  ([employer])   Community Resources None   Social/Functional History   Lives With Spouse   Type of Home House   Home Equipment None   Receives Help From Family   Prior Level of Assist for ADLs Independent   Prior Level of Assist for Homemaking Independent   Homemaking Responsibilities No   Ambulation Assistance Independent   Prior Level of Assist for Transfers Independent   Active  Yes   Mode of Transportation Car   Occupation Full time employment   Discharge Planning   Type of Residence House   Living Arrangements Spouse/Significant Other   Current Services Prior To Admission None   Potential Assistance Needed N/A   DME Ordered? No   Potential Assistance Purchasing Medications No   Type of Home Care Services None   Patient expects to be discharged to: House   Services At/After Discharge   Transition of Care Consult (CM Consult) Discharge Planning   Services At/After Discharge None    Resource Information

## 2025-05-22 NOTE — PROGRESS NOTES
0800: pt arrived to floor, ambulating with no assist    1600: pt converted to sinus rhythm. Mari NP notified and 12 lead EKG done    1930: Bedside and Verbal shift change report given to Rebeca RN (oncoming nurse) by Suleman RIDDLE (offgoing nurse). Report included the following information Nurse Handoff Report, Index, Intake/Output, MAR, Recent Results, and Cardiac Rhythm NSR LBBB .

## 2025-05-23 ENCOUNTER — APPOINTMENT (OUTPATIENT)
Facility: HOSPITAL | Age: 40
End: 2025-05-23
Attending: INTERNAL MEDICINE
Payer: COMMERCIAL

## 2025-05-23 LAB
ANION GAP SERPL CALC-SCNC: 5 MMOL/L (ref 2–12)
BUN SERPL-MCNC: 24 MG/DL (ref 6–20)
BUN/CREAT SERPL: 17 (ref 12–20)
CALCIUM SERPL-MCNC: 8.9 MG/DL (ref 8.5–10.1)
CHLORIDE SERPL-SCNC: 102 MMOL/L (ref 97–108)
CO2 SERPL-SCNC: 31 MMOL/L (ref 21–32)
CREAT SERPL-MCNC: 1.4 MG/DL (ref 0.7–1.3)
EKG ATRIAL RATE: 66 BPM
EKG DIAGNOSIS: NORMAL
EKG P AXIS: 73 DEGREES
EKG P-R INTERVAL: 188 MS
EKG Q-T INTERVAL: 522 MS
EKG QRS DURATION: 178 MS
EKG QTC CALCULATION (BAZETT): 547 MS
EKG R AXIS: -24 DEGREES
EKG T AXIS: 47 DEGREES
EKG VENTRICULAR RATE: 66 BPM
GLUCOSE SERPL-MCNC: 103 MG/DL (ref 65–100)
MAGNESIUM SERPL-MCNC: 2.3 MG/DL (ref 1.6–2.4)
MAGNESIUM SERPL-MCNC: 2.8 MG/DL (ref 1.6–2.4)
POTASSIUM SERPL-SCNC: 4.2 MMOL/L (ref 3.5–5.1)
SODIUM SERPL-SCNC: 138 MMOL/L (ref 136–145)

## 2025-05-23 PROCEDURE — 80048 BASIC METABOLIC PNL TOTAL CA: CPT

## 2025-05-23 PROCEDURE — 2060000000 HC ICU INTERMEDIATE R&B

## 2025-05-23 PROCEDURE — 6360000002 HC RX W HCPCS: Performed by: SPECIALIST

## 2025-05-23 PROCEDURE — 6370000000 HC RX 637 (ALT 250 FOR IP): Performed by: HOSPITALIST

## 2025-05-23 PROCEDURE — 6370000000 HC RX 637 (ALT 250 FOR IP)

## 2025-05-23 PROCEDURE — 83735 ASSAY OF MAGNESIUM: CPT

## 2025-05-23 PROCEDURE — 99233 SBSQ HOSP IP/OBS HIGH 50: CPT | Performed by: HOSPITALIST

## 2025-05-23 PROCEDURE — 93005 ELECTROCARDIOGRAM TRACING: CPT | Performed by: INTERNAL MEDICINE

## 2025-05-23 RX ORDER — DOFETILIDE 0.12 MG/1
375 CAPSULE ORAL EVERY 12 HOURS SCHEDULED
Status: DISCONTINUED | OUTPATIENT
Start: 2025-05-23 | End: 2025-05-24 | Stop reason: HOSPADM

## 2025-05-23 RX ORDER — MAGNESIUM SULFATE IN WATER 40 MG/ML
2000 INJECTION, SOLUTION INTRAVENOUS ONCE
Status: COMPLETED | OUTPATIENT
Start: 2025-05-23 | End: 2025-05-23

## 2025-05-23 RX ADMIN — CETIRIZINE HYDROCHLORIDE 10 MG: 10 TABLET, FILM COATED ORAL at 21:31

## 2025-05-23 RX ADMIN — DOFETILIDE 375 MCG: 0.12 CAPSULE ORAL at 21:31

## 2025-05-23 RX ADMIN — DOFETILIDE 375 MCG: 0.12 CAPSULE ORAL at 10:00

## 2025-05-23 RX ADMIN — CARVEDILOL 6.25 MG: 6.25 TABLET, FILM COATED ORAL at 08:46

## 2025-05-23 RX ADMIN — AMLODIPINE BESYLATE 2.5 MG: 5 TABLET ORAL at 21:31

## 2025-05-23 RX ADMIN — MAGNESIUM SULFATE HEPTAHYDRATE 2000 MG: 40 INJECTION, SOLUTION INTRAVENOUS at 03:10

## 2025-05-23 RX ADMIN — VALSARTAN 160 MG: 160 TABLET, FILM COATED ORAL at 11:14

## 2025-05-23 RX ADMIN — CARVEDILOL 6.25 MG: 6.25 TABLET, FILM COATED ORAL at 17:29

## 2025-05-23 RX ADMIN — PANTOPRAZOLE SODIUM 40 MG: 40 TABLET, DELAYED RELEASE ORAL at 08:46

## 2025-05-23 RX ADMIN — RIVAROXABAN 20 MG: 20 TABLET, FILM COATED ORAL at 08:46

## 2025-05-23 NOTE — PLAN OF CARE
0200: notified Dr. Recinos of Mag 0.9, 2 g IV magnesium ordered    0515: per lab, a machine error initially reported magnesium level 0.9. Results were rerun and magnesium level came back at 2.3, will redraw magnesium level after order for IV magnesium    Problem: Discharge Planning  Goal: Discharge to home or other facility with appropriate resources  5/22/2025 2024 by Rebeca Dee, RN  Outcome: Progressing  Flowsheets (Taken 5/22/2025 2024)  Discharge to home or other facility with appropriate resources: Identify barriers to discharge with patient and caregiver  5/22/2025 1232 by Eric Cason, RN  Outcome: Progressing

## 2025-05-23 NOTE — DISCHARGE INSTRUCTIONS
Tikosyn (Dofetilide) General Instructions:    -You are on a medication that can prolong your QT interval (an electrocardiogram/ECG marker).    -Avoid starting any QT prolonging medications.    -Do not start contraindicated concomitant medications:  -Hydrochlorothiazide, Chlorthalidone, Bactrim, Trimethoprim, Ketoconazole, Itraconazole, Posaconazole, Erythromycin,  Clarithromycin, Levofloxacin and other similar antibiotics, Prochlorperazine, Domperidone, Citalopram (Celexa), Quetiapine (seroquel), Verapamil, Cimetidine, Megestrol, Lamotrigine (lamictal), Dolutegravir (this is not a complete list).  -Azithromycin, Escitalopram, Chloroquine, Fluorouracil, Methadone, Ondansteron (Zofran), Olanzapine (Zyprexa), Clomipramine, Imipramine (this is not a complete list).    -Many anti-nausea medications, anti-depressant/mood medications, and some antibiotics can have interactions with Tikosyn and need caution    -Avoid Grapefruit juice since it has major interaction with Tikosyn    -Before starting any new medications, I recommend discussing with your pharmacist and/or physician.     -It is important that you maintain an adequate potassium and magnesium while on this medication.    -If you are on a diuretic, then potassium and magnesium levels need to be monitored more often    -If you have the stomach flu or frequent diarrhea, it would be reasonable to not take Tikosyn (dofetilide).    Outpatient Monitoring     -Monitor Creatinine, Potassium and Magnesium every 6 months    -Monitor ECG every 6 months

## 2025-05-23 NOTE — PROGRESS NOTES
[facility] CARDIOLOGY  Cardiac Electrophysiology Note  ATTESTATION    I have seen, interviewed, and examined the patient.  I agree with the history, exam, and was involved in the formulation of the assessment and plan as detailed in the Cardiology/Cardiac Electrophysiology consultation documentation composed today by the Advance Practice Provider (JORGE, NP, PA). Please see the JORGE’s note for full details, below includes any additional revisions. I have performed the exam and medical decision making portions in its entirety. I personally reviewed the relevant data including labs and imaging.       Assessment and Plan     Principal Problem:    Visit for monitoring Tikosyn therapy  Resolved Problems:    * No resolved hospital problems. *      # Persistent atypical atrial flutter -mitral atrial flutter s/p anterior mitral line and lateral mitral line ablation with recurrence  # History of persistent atrial fibrillation s/p ablation  # Chronic Left bundle branch block  # HTN  # Chronic Kidney Disease Stage Stage II  Estimated Creatinine Clearance: 74 mL/min (A) (based on SCr of 1.4 mg/dL (H)).     Initial EKGs reviewed showed atypical atrial flutter.  He has left bundle branch block.  Tikosyn loading plan as arranged by Dr. Cervantes who primary EP.     --He converted to sinus after first dose of Tikosyn  --Given left bundle branch block with wide  ms, QTc interval limit will be up to 560 ms  --Received 2 doses of Tikosyn 500 mcg on 5/22 Thursday  --Changed to Tikosyn 375 mcg from 5/23 Friday morning    --Qtc after third total dose of Tikosyn (first of 375) given Friday morning showed Qtc 547 ms  --Will continue Tikosyn 375 mcg q12h and continue monitoring  --Tikosyn education given to patient as below     # High Risk Medication Use - Tikosyn  We discussed in great detail regarding high risk medication management and the associated risks of antiarrhythmic therapy.  Patient reports full understanding of  that Tikosyn may induce life-threatening polymorphic ventricular tachycardia or torsades tamara pointes. I discussed with patient importance of keeping his potassium and magnesium level in normal range. Instructions given to patient as detailed in plan below.    # Anticoagulation:  -Continue Xarelto for thromboembolic prophylaxis until 07/17/2025.  -Denies bleeding issues.  -Knows to contact clinic for BRBPR, melena, hematuria, or hemoptysis.     # HTN:   -BP controlled.  -Continue carvedilol, valsartan, & amlodipine.       # CKD II:  - creat 1.40  - recommend follow up with nephrology outpatient.  Estimated Creatinine Clearance: 74 mL/min (A) (based on SCr of 1.4 mg/dL (H)).    # CARLOS:   -Diagnosed several months ago.  -Mild.  -Wearing dental appliance; encouraged compiance.  -Previously discussed role of uncontrolled CARLOS in the pathogenesis of AF/AFL.       ____________________________________________________________    Cardiac testing  No results found for this or any previous visit.    No results found for this or any previous visit.    No results found for this or any previous visit.      Most recent HS troponins:  No results for input(s): \"TROPHS\", \"CKMB\" in the last 72 hours.    ECG: Atypical AFL 78bpm, QRS 170ms, Qtc 490ms    Review of Systems:    [x]All other systems reviewed and all negative except as written in HPI    [] Patient unable to provide secondary to condition    Past Medical History:   Diagnosis Date    Atrial fibrillation (HCC)     CAD (coronary artery disease)     Essential hypertension     Hyperlipidemia      Past Surgical History:   Procedure Laterality Date    CARDIAC PROCEDURE N/A 2/22/2024    Intracardiac echocardiogram performed by Valarie Cervantes MD at [facility] CARDIAC CATH LAB    CARDIAC PROCEDURE N/A 4/17/2025    Intracardiac echocardiogram performed by Valarie Cervantes MD at [facility] CARDIAC CATH LAB    EP DEVICE PROCEDURE N/A 2/22/2024    Ablation A-fib w complete ep study performed by Valarie Cervantes MD at [facility]

## 2025-05-24 VITALS
HEART RATE: 69 BPM | DIASTOLIC BLOOD PRESSURE: 73 MMHG | TEMPERATURE: 98.1 F | HEIGHT: 71 IN | OXYGEN SATURATION: 98 % | BODY MASS INDEX: 27.59 KG/M2 | RESPIRATION RATE: 18 BRPM | SYSTOLIC BLOOD PRESSURE: 118 MMHG | WEIGHT: 197.09 LBS

## 2025-05-24 LAB
ANION GAP SERPL CALC-SCNC: 7 MMOL/L (ref 2–12)
BUN SERPL-MCNC: 22 MG/DL (ref 6–20)
BUN/CREAT SERPL: 17 (ref 12–20)
CALCIUM SERPL-MCNC: 8.8 MG/DL (ref 8.5–10.1)
CHLORIDE SERPL-SCNC: 106 MMOL/L (ref 97–108)
CO2 SERPL-SCNC: 26 MMOL/L (ref 21–32)
CREAT SERPL-MCNC: 1.3 MG/DL (ref 0.7–1.3)
EKG ATRIAL RATE: 68 BPM
EKG DIAGNOSIS: NORMAL
EKG P AXIS: 71 DEGREES
EKG P-R INTERVAL: 192 MS
EKG Q-T INTERVAL: 500 MS
EKG QRS DURATION: 174 MS
EKG QTC CALCULATION (BAZETT): 531 MS
EKG R AXIS: -18 DEGREES
EKG T AXIS: 59 DEGREES
EKG VENTRICULAR RATE: 68 BPM
GLUCOSE SERPL-MCNC: 94 MG/DL (ref 65–100)
MAGNESIUM SERPL-MCNC: 2.4 MG/DL (ref 1.6–2.4)
POTASSIUM SERPL-SCNC: 3.7 MMOL/L (ref 3.5–5.1)
SODIUM SERPL-SCNC: 139 MMOL/L (ref 136–145)

## 2025-05-24 PROCEDURE — 6370000000 HC RX 637 (ALT 250 FOR IP)

## 2025-05-24 PROCEDURE — 6370000000 HC RX 637 (ALT 250 FOR IP): Performed by: INTERNAL MEDICINE

## 2025-05-24 PROCEDURE — 80048 BASIC METABOLIC PNL TOTAL CA: CPT

## 2025-05-24 PROCEDURE — 6370000000 HC RX 637 (ALT 250 FOR IP): Performed by: HOSPITALIST

## 2025-05-24 PROCEDURE — 99233 SBSQ HOSP IP/OBS HIGH 50: CPT | Performed by: INTERNAL MEDICINE

## 2025-05-24 PROCEDURE — 83735 ASSAY OF MAGNESIUM: CPT

## 2025-05-24 RX ORDER — POTASSIUM CHLORIDE 750 MG/1
20 TABLET, EXTENDED RELEASE ORAL ONCE
Status: COMPLETED | OUTPATIENT
Start: 2025-05-24 | End: 2025-05-24

## 2025-05-24 RX ORDER — DOFETILIDE 0.12 MG/1
375 CAPSULE ORAL EVERY 12 HOURS SCHEDULED
Qty: 180 CAPSULE | Refills: 5 | Status: SHIPPED | OUTPATIENT
Start: 2025-05-24

## 2025-05-24 RX ORDER — LANOLIN ALCOHOL/MO/W.PET/CERES
400 CREAM (GRAM) TOPICAL DAILY
Qty: 30 TABLET | Refills: 5 | Status: SHIPPED | OUTPATIENT
Start: 2025-05-24

## 2025-05-24 RX ORDER — DOFETILIDE 0.12 MG/1
375 CAPSULE ORAL EVERY 12 HOURS SCHEDULED
Qty: 180 CAPSULE | Refills: 5 | Status: SHIPPED | OUTPATIENT
Start: 2025-05-24 | End: 2025-05-24

## 2025-05-24 RX ADMIN — CARVEDILOL 6.25 MG: 6.25 TABLET, FILM COATED ORAL at 08:48

## 2025-05-24 RX ADMIN — POTASSIUM CHLORIDE 20 MEQ: 750 TABLET, FILM COATED, EXTENDED RELEASE ORAL at 08:50

## 2025-05-24 RX ADMIN — RIVAROXABAN 20 MG: 20 TABLET, FILM COATED ORAL at 08:48

## 2025-05-24 RX ADMIN — VALSARTAN 160 MG: 160 TABLET, FILM COATED ORAL at 08:49

## 2025-05-24 RX ADMIN — Medication 400 MG: at 08:49

## 2025-05-24 RX ADMIN — DOFETILIDE 375 MCG: 0.12 CAPSULE ORAL at 08:49

## 2025-05-24 NOTE — PROGRESS NOTES
1330  Patient has received discharge paperwork and After Visit Summary. Discussed and reviewed medication list, follow-up appointment(s), and where to pick-up medications. Removed IV access and leads. Wheeled patient downstairs.      1215  RN paged EP for barrier to discharge.     1205  Patient notified RN that his pharmacy will not be able to fill prescription until after prior automation hs gone through; this will not be completed until after Tuesday 5/27 due to holiday weekend. RN notified CM.    0730  Verbal bedside report received from VENKATESH Jose given to VENKATESH Izquierdo. Report included vital signs, SBAR, and plan for the day. Patient rhythm NSR w/BBB. Patient is in bed awake and respirations are even and unlabored. Call bell is within reach. Bed alarm is off, not indicated.     Care Plan:    Problem: Discharge Planning  Goal: Discharge to home or other facility with appropriate resources  5/24/2025 1046 by Felecia Weir RN  Outcome: Adequate for Discharge  Flowsheets (Taken 5/24/2025 0845)  Discharge to home or other facility with appropriate resources: Identify barriers to discharge with patient and caregiver  5/24/2025 0118 by Rebeca Dee, RN  Outcome: Progressing  Flowsheets (Taken 5/24/2025 0118)  Discharge to home or other facility with appropriate resources: Identify barriers to discharge with patient and caregiver

## 2025-05-24 NOTE — PROGRESS NOTES
monitoring  - cont Magnesium 400 mg daily  --Tikosyn education given to patient as below  - 5th dose this AM  - repeat EKG 2 hours post dose  - anticipate discharge today after EKG  - FU with EP clinic in 2-4 weeks    Future Appointments   Date Time Provider Department Center   8/12/2025 10:40 AM [facility]   8/26/2025 11:40 AM Valarie Cervantes MD [facility] BS AMB   4/14/2026 10:00 AM Brigida Fernandez MD [facility] BS AMB          # High Risk Medication Use - Tikosyn  We discussed in great detail regarding high risk medication management and the associated risks of antiarrhythmic therapy.  Patient reports full understanding of recommendations. We discussed that Tikosyn (Dofetilide) may induce or worsen ventricular dysrhythmias with therapeutic use, producing life-threatening polymorphic ventricular tachycardia or torsades tamara pointes.  I discussed with patient importance of keeping his potassium and magnesium level in normal range. Instructions given to patient as detailed in plan below.    # Anticoagulation:  -Continue Xarelto for thromboembolic prophylaxis until 07/17/2025.  -Denies bleeding issues.  -Knows to contact clinic for BRBPR, melena, hematuria, or hemoptysis.     # HTN:   -BP controlled.  -Continue carvedilol, valsartan, & amlodipine.       # CKD II:  - creat 1.3  - recommend follow up with nephrology outpatient.  Estimated Creatinine Clearance: 74 mL/min (A) (based on SCr of 1.4 mg/dL (H)).     # CARLOS:   -Diagnosed several months ago.  -Mild.  -Wearing dental appliance; encouraged compiance.  -Previously discussed role of uncontrolled CARLOS in the pathogenesis of AF/AFL.       Thank you so much for the consultation. [facility] will continue to follow along. Please don't hesitate to contact us with any questions or referrals.      [x]    High complexity decision making was performed  []    Patient is at high-risk of decompensation with multiple organ involvement    Result (most recent):  No results found for this or any previous visit from the past 3650 days.      Lab Results   Component Value Date/Time     05/24/2025 12:05 AM    K 3.7 05/24/2025 12:05 AM     05/24/2025 12:05 AM    CO2 26 05/24/2025 12:05 AM    BUN 22 05/24/2025 12:05 AM    CREATININE 1.30 05/24/2025 12:05 AM    GLUCOSE 94 05/24/2025 12:05 AM    GLUCOSE 97 02/07/2024 09:57 AM    CALCIUM 8.8 05/24/2025 12:05 AM    LABGLOM 71 05/24/2025 12:05 AM    LABGLOM 66 02/07/2024 09:57 AM      No results found for: \"BNP\"  Lab Results   Component Value Date    WBC 4.0 (L) 05/22/2025    HGB 11.9 (L) 05/22/2025    HCT 38.3 05/22/2025    MCV 79.3 (L) 05/22/2025     05/22/2025     No results for input(s): \"CHOL\", \"HDLC\", \"LDLC\", \"HBA1C\" in the last 72 hours.    Invalid input(s): \"TGL\"    Current meds:    Current Facility-Administered Medications:     potassium chloride (KLOR-CON) extended release tablet 20 mEq, 20 mEq, Oral, Once, Valarie Cervantes MD    dofetilide (TIKOSYN) capsule 375 mcg, 375 mcg, Oral, 2 times per day, Warren Wing MD, 375 mcg at 05/23/25 2131    magnesium oxide (MAG-OX) tablet 400 mg, 400 mg, Oral, BID, Valarie Cervantes MD, 400 mg at 05/22/25 2029    valsartan (DIOVAN) tablet 160 mg, 160 mg, Oral, Daily, Foreign Guerra APRN - NP, 160 mg at 05/23/25 1114    cetirizine (ZYRTEC) tablet 10 mg, 10 mg, Oral, Daily, Foreign Guerra APRN - NP, 10 mg at 05/23/25 2131    pantoprazole (PROTONIX) tablet 40 mg, 40 mg, Oral, BID AC, MotashirForeign, APRN - NP, 40 mg at 05/23/25 0846    carvedilol (COREG) tablet 6.25 mg, 6.25 mg, Oral, BID with meals, Moyar, Foreign, APRN - NP, 6.25 mg at 05/23/25 1729    amLODIPine (NORVASC) tablet 2.5 mg, 2.5 mg, Oral, Daily, Foreign Guerra, APRN - NP, 2.5 mg at 05/23/25 2131    rivaroxaban (XARELTO) tablet 20 mg, 20 mg, Oral, Daily, Moyar, Foreign, APRN - NP, 20 mg at 05/23/25 0846    albuterol (PROVENTIL) (2.5 MG/3ML) 0.083% nebulizer solution 2.5 mg, 2.5 mg,

## 2025-05-24 NOTE — PLAN OF CARE
Problem: Discharge Planning  Goal: Discharge to home or other facility with appropriate resources  Outcome: Progressing  Flowsheets (Taken 5/24/2025 0118)  Discharge to home or other facility with appropriate resources: Identify barriers to discharge with patient and caregiver

## 2025-05-24 NOTE — PROGRESS NOTES
EKG post 5th dose of Tikosyn reviewed, QT of 500 ms, after correcting for LBBB (174 ms), Qtc of 440 ms. Patient is stable for discharge.

## 2025-05-24 NOTE — DISCHARGE SUMMARY
Cardiology Discharge Summary         Patient ID:       Carlos Domingo  [id number]  39 y.o.  1985    Admit Date: 5/22/2025    Discharge Date: 5/24/2025     Admitting Physician: Valarie Cervantes MD   PCP: None, None    Discharge Physician: Valarie Cervantes MD    Consults: none                    Cardiac Rehab, PT, OT, Case management     Admission Diagnoses: Atrial fibrillation (HCC) [I48.91]  Visit for monitoring Tikosyn therapy [Z51.81, Z79.899]    Discharge Diagnoses: Principal Problem:    Visit for monitoring Tikosyn therapy  Resolved Problems:    * No resolved hospital problems. *      Discharge Condition: Good    Cardiology Procedures this Admission:   None    Hospital Course:    Carlos Domingo is a 40 yo M with PMH significant for persistent atypical AFL, paroxysmal AF anticoagulated on xarelto, HTN, CARLOS who presents today for tikosyn loading for persistent atypical AFL.      -S/p pulsed field ablation (PVI, GP, Ligament of Abdirashid) & confirmation of CTI block (02/22/2024-[facility]) & PVI, anterior mitral isthmus, lateral mitral isthmus, endocardial & epicardial ablation within the CS, & anterior LA ablation; persistent CTI block confirmed (04/17/2025-[facility]). He presented for Tikosyn load admission. He tolerated 375 mcg BID of Tikosyn. Plan to discharge on this dose with Mg 400 mg daily.     After receiving maximum benefit from his in-patient hospitalization, he was ready for discharge. At the time of discharge  He was up ambulating and hemodynamically stable.     Discharge Exam:   /82   Pulse 77   Temp 98 °F (36.7 °C) (Oral)   Resp 18   Ht 1.803 m (5' 11\")   Wt 89.4 kg (197 lb 1.5 oz)   SpO2 97%   BMI 27.49 kg/m²   See Final Progress Note    Disposition: home    Patient Instructions:   Current Discharge Medication List        START taking these medications    Details   dofetilide (TIKOSYN) 125 MCG capsule Take 3 capsules by mouth every 12 hours  Qty: 180 capsule, Refills: 5      magnesium oxide (MAG-OX) 400 (240 Mg)

## 2025-05-27 ENCOUNTER — TELEPHONE (OUTPATIENT)
Age: 40
End: 2025-05-27

## 2025-05-27 LAB
EKG ATRIAL RATE: 65 BPM
EKG ATRIAL RATE: 68 BPM
EKG DIAGNOSIS: NORMAL
EKG DIAGNOSIS: NORMAL
EKG P AXIS: 70 DEGREES
EKG P AXIS: 71 DEGREES
EKG P-R INTERVAL: 192 MS
EKG P-R INTERVAL: 192 MS
EKG Q-T INTERVAL: 500 MS
EKG Q-T INTERVAL: 506 MS
EKG QRS DURATION: 174 MS
EKG QRS DURATION: 180 MS
EKG QTC CALCULATION (BAZETT): 526 MS
EKG QTC CALCULATION (BAZETT): 531 MS
EKG R AXIS: -18 DEGREES
EKG R AXIS: -30 DEGREES
EKG T AXIS: 46 DEGREES
EKG T AXIS: 59 DEGREES
EKG VENTRICULAR RATE: 65 BPM
EKG VENTRICULAR RATE: 68 BPM

## 2025-05-27 NOTE — TELEPHONE ENCOUNTER
----- Message from Dr. Valarie Cervantes MD sent at 5/24/2025  9:04 AM EDT -----  S/p Tikosyn load, please schedule for FU with NP in 2-4 weeks. Being discharge today. Thanks.    Called patient, ID verified using two patient identifiers.  Scheduled for follow up as recommended above.    Patient verbalized understanding and will call back with any other questions or concerns.    Future Appointments   Date Time Provider Department Center   6/17/2025  9:00 AM Moni Juárez APRN - NP [facility]  AMB   8/12/2025 10:40 AM Kermit Carey MD [facility]   8/26/2025 11:40 [facility]   4/14/2026 10:00 AM Brigida Fernandez MD [facility] BS AMB

## 2025-05-28 ENCOUNTER — CLINICAL DOCUMENTATION (OUTPATIENT)
Age: 40
End: 2025-05-28

## 2025-05-30 ENCOUNTER — TELEPHONE (OUTPATIENT)
Age: 40
End: 2025-05-30

## 2025-05-30 NOTE — TELEPHONE ENCOUNTER
Attempted to reach patient by telephone. Patient identifies self on voicemail.  Message left letting patient know that we can get him in to see Moni on Tuesday, 6/3/25 at our [place] office.  I will call him on Monday to discuss further and schedule.  HIPAA compliant message was left for return call.

## 2025-06-11 ENCOUNTER — OFFICE VISIT (OUTPATIENT)
Age: 40
End: 2025-06-11
Payer: COMMERCIAL

## 2025-06-11 ENCOUNTER — PREP FOR PROCEDURE (OUTPATIENT)
Age: 40
End: 2025-06-11

## 2025-06-11 VITALS
OXYGEN SATURATION: 99 % | DIASTOLIC BLOOD PRESSURE: 70 MMHG | HEART RATE: 127 BPM | SYSTOLIC BLOOD PRESSURE: 110 MMHG | BODY MASS INDEX: 27.86 KG/M2 | WEIGHT: 199 LBS | RESPIRATION RATE: 16 BRPM | HEIGHT: 71 IN

## 2025-06-11 DIAGNOSIS — Z79.899 VISIT FOR MONITORING TIKOSYN THERAPY: ICD-10-CM

## 2025-06-11 DIAGNOSIS — Z51.81 VISIT FOR MONITORING TIKOSYN THERAPY: ICD-10-CM

## 2025-06-11 DIAGNOSIS — I10 PRIMARY HYPERTENSION: ICD-10-CM

## 2025-06-11 DIAGNOSIS — Z86.79 S/P ABLATION OF ATRIAL FIBRILLATION: ICD-10-CM

## 2025-06-11 DIAGNOSIS — G47.33 OSA (OBSTRUCTIVE SLEEP APNEA): ICD-10-CM

## 2025-06-11 DIAGNOSIS — I48.0 PAROXYSMAL ATRIAL FIBRILLATION (HCC): Primary | ICD-10-CM

## 2025-06-11 DIAGNOSIS — Z79.01 ANTICOAGULATED: ICD-10-CM

## 2025-06-11 DIAGNOSIS — I48.4 ATYPICAL ATRIAL FLUTTER (HCC): ICD-10-CM

## 2025-06-11 DIAGNOSIS — Z98.890 S/P ABLATION OF ATRIAL FIBRILLATION: ICD-10-CM

## 2025-06-11 PROCEDURE — 3074F SYST BP LT 130 MM HG: CPT

## 2025-06-11 PROCEDURE — 99214 OFFICE O/P EST MOD 30 MIN: CPT

## 2025-06-11 PROCEDURE — 3078F DIAST BP <80 MM HG: CPT

## 2025-06-11 PROCEDURE — 93000 ELECTROCARDIOGRAM COMPLETE: CPT

## 2025-06-11 RX ORDER — SODIUM CHLORIDE 0.9 % (FLUSH) 0.9 %
5-40 SYRINGE (ML) INJECTION EVERY 12 HOURS SCHEDULED
Status: CANCELLED | OUTPATIENT
Start: 2025-06-11

## 2025-06-11 RX ORDER — SODIUM CHLORIDE 9 MG/ML
INJECTION, SOLUTION INTRAVENOUS PRN
Status: CANCELLED | OUTPATIENT
Start: 2025-06-11

## 2025-06-11 RX ORDER — SODIUM CHLORIDE 0.9 % (FLUSH) 0.9 %
5-40 SYRINGE (ML) INJECTION PRN
Status: CANCELLED | OUTPATIENT
Start: 2025-06-11

## 2025-06-11 RX ORDER — CARVEDILOL 6.25 MG/1
12.5 TABLET ORAL 2 TIMES DAILY
Qty: 60 TABLET | Refills: 5 | Status: ON HOLD
Start: 2025-06-11 | End: 2025-06-13

## 2025-06-11 ASSESSMENT — PATIENT HEALTH QUESTIONNAIRE - PHQ9
1. LITTLE INTEREST OR PLEASURE IN DOING THINGS: NOT AT ALL
SUM OF ALL RESPONSES TO PHQ QUESTIONS 1-9: 0
2. FEELING DOWN, DEPRESSED OR HOPELESS: NOT AT ALL
SUM OF ALL RESPONSES TO PHQ QUESTIONS 1-9: 0

## 2025-06-11 NOTE — PROGRESS NOTES
1. Have you been to the ER, urgent care clinic since your last visit?  Hospitalized since your last visit?No    2. Have you seen or consulted any other health care providers outside of the [facility] since your last visit?  Include any pap smears or colon screening. No    
confirmation of CTI block (02/22/2024-[facility]).    Discontinued flecainide due to LBBB, still present after discontinuation.    Atrial flutter had left bundle aberrancy, likely due to flecainide.    CARLOS, uses dental appliance.       Assessment and Plan       ICD-10-CM    1. Paroxysmal atrial fibrillation (HCC)  I48.0 EKG 12 Lead      2. Atypical atrial flutter (HCC)  I48.4 EKG 12 Lead      3. Anticoagulated  Z79.01 EKG 12 Lead      4. Primary hypertension  I10 EKG 12 Lead      5. S/P ablation of atrial fibrillation  Z98.890 EKG 12 Lead    Z86.79       6. Visit for monitoring Tikosyn therapy  Z51.81 EKG 12 Lead    Z79.899       7. CARLOS (obstructive sleep apnea)  G47.33 EKG 12 Lead            PAF/persistent AFL:  -AFL had left bundle aberrancy.  -S/p pulsed field ablation (PVI, GP, Ligament of Abdirashid) & confirmation of CTI block (02/22/2024-[facility]).  -Holter in 06/2024 showed predominantly AF/AFL with HR  bpm, avg 90 bpm.  -S/p DCCV in 01/2025.  -S/p PVI, anterior mitral isthmus, lateral mitral isthmus, endocardial & epicardial ablation within the CS, & anterior LA ablation; persistent CTI block confirmed (04/17/2025-[facility]).  -Recurrent atypical AFL noted via during blanking period.  -ECG today shows AFL RVR 127bpm, LBBB, QTC 462ms(corrected for LBBB).  LBBB is chronic.  -Continue Tikosyn 375 mcg po bid x >3 months.  For duration of Tikosyn therapy, will require ECG q 3-6 months, BMP & Mg q 3-6 months.  Maintain K>4 & Mg>2.  -Continue Mag-Ox 400 mg po daily.  -schedule for DCCV on Friday at 1 pm at [place] with dr. Cervantes   - discussed risks/benefits of DCCV with patient and he wishes to proceed.  - increase coreg to 12.5mg BID for improved rate control.  - follow up with dr. Cervantes in August.  - discussed the role of ETOH in pathogenesis of AF/AF, encouraged cessation.     Anticoagulation:  -Continue Xarelto for thromboembolic prophylaxis until 07/17/2025.  -Denies bleeding issues.  -Knows to contact clinic for BRBPR,

## 2025-06-11 NOTE — PATIENT INSTRUCTIONS
PLEASE INCREASE YOUR CARVEDILOL TO 12.5 MG TWICE DAILY.      PLEASE be aware that your procedure date/time is tentative and subject to change due to emergency cases.    Any changes to your procedure date/time will be communicated by the hospital staff.      Your CARDIOVERSION procedure has been scheduled for 6/13/25 at 100 PM, at [facility].    Please report to first floor Admitting Department by 1200 PM, or 1 hour prior to your scheduled procedure. Please bring a list of your current medications and medication bottles, if able, to the hospital on this day.  You will be unable to drive after your procedure so please make sure to bring someone with you to your procedure.    You will need to have nothing to eat or drink after midnight, the night prior to your procedure. You may have small sips of water, if needed, to take with your medication.      Medication Instructions:  You may take your morning medications as scheduled     After your procedure, you will need to follow up with Fam Cervantes as scheduled.

## 2025-06-13 ENCOUNTER — HOSPITAL ENCOUNTER (OUTPATIENT)
Facility: HOSPITAL | Age: 40
Discharge: HOME | End: 2025-06-13
Attending: INTERNAL MEDICINE
Payer: COMMERCIAL

## 2025-06-13 VITALS
DIASTOLIC BLOOD PRESSURE: 79 MMHG | OXYGEN SATURATION: 100 % | RESPIRATION RATE: 16 BRPM | HEART RATE: 91 BPM | SYSTOLIC BLOOD PRESSURE: 106 MMHG

## 2025-06-13 DIAGNOSIS — I48.91 A-FIB (HCC): ICD-10-CM

## 2025-06-13 PROCEDURE — 99152 MOD SED SAME PHYS/QHP 5/>YRS: CPT | Performed by: INTERNAL MEDICINE

## 2025-06-13 PROCEDURE — 92960 CARDIOVERSION ELECTRIC EXT: CPT | Performed by: INTERNAL MEDICINE

## 2025-06-13 PROCEDURE — 6360000002 HC RX W HCPCS: Performed by: INTERNAL MEDICINE

## 2025-06-13 PROCEDURE — 93005 ELECTROCARDIOGRAM TRACING: CPT | Performed by: INTERNAL MEDICINE

## 2025-06-13 PROCEDURE — 92960 CARDIOVERSION ELECTRIC EXT: CPT

## 2025-06-13 PROCEDURE — 99152 MOD SED SAME PHYS/QHP 5/>YRS: CPT

## 2025-06-13 PROCEDURE — 2500000003 HC RX 250 WO HCPCS: Performed by: INTERNAL MEDICINE

## 2025-06-13 RX ORDER — CARVEDILOL 6.25 MG/1
TABLET ORAL
Qty: 60 TABLET | Refills: 5 | Status: SHIPPED | OUTPATIENT
Start: 2025-06-13

## 2025-06-13 RX ORDER — MIDAZOLAM HYDROCHLORIDE 1 MG/ML
INJECTION, SOLUTION INTRAMUSCULAR; INTRAVENOUS PRN
Status: COMPLETED | OUTPATIENT
Start: 2025-06-13 | End: 2025-06-13

## 2025-06-13 RX ADMIN — METHOHEXITAL SODIUM 50 MG: 500 INJECTION, POWDER, LYOPHILIZED, FOR SOLUTION INTRAMUSCULAR; INTRAVENOUS; RECTAL at 13:28

## 2025-06-13 RX ADMIN — MIDAZOLAM HYDROCHLORIDE 1 MG: 1 INJECTION, SOLUTION INTRAMUSCULAR; INTRAVENOUS at 13:28

## 2025-06-13 NOTE — PROGRESS NOTES
Patient arrived to Non-Invasive Cardiology Lab for Out Patient cardioversion Procedure. Staff introduced to patient. Patient identifiers verified with Name and Date of Birth. Procedure verified with patient. Consent forms reviewed and signed by patient or authorized representative and verified. Allergies verified. Patient informed of procedure and plan of care. Questions answered with review.     Patient on cardiac monitor, non-invasive blood pressure, SPO2 monitor. On room air. Patient is A&Ox3. Patient reports no complaints.    Patient on stretcher, in low position, with side rails up. Patient instructed to call for assistance as needed.    Family in waiting room.

## 2025-06-13 NOTE — PROCEDURES
BRIEF PROCEDURE NOTE    Date of Procedure: 6/13/2025   Preoperative Diagnosis: atrial fibrillation  Postoperative Diagnosis: same   Procedure: Synchronized DC cardioversion  Cardiologist: Valarie Cervantes MD  Anesthesia:    I administered moderate sedation throughout this procedure. An independent trained observer pushed medications at my direction, and monitored the patient’s level of consciousness and physiological status throughout, total sedation time of 15 mins.  Estimated Blood Loss: None      Informed consent obtained. Appropriate time out performed.  Patient was sedated with IV 1  mg midazolam and 50 mg of Brevatol. Patient has been on uninterrupted anticoagulation for at least 1 month.  After ensuring that patient was adequately sedated, a 300 joule biphasic shock was delivered using anterior and posterior pads converting atrial flutter to sinus rhythm.     No complications were noted.     Patient's vital signs were stable and was moving all four extremities at the end of procedure.    Valarie Cervantes MD

## 2025-06-13 NOTE — PROGRESS NOTES
Discharge instructions reviewed with patient and Mom, Kerry. Allowed adequate time to ask questions, all questions answered. Printed copy of AVS given to patient. All belongings gathered, IV and tele discontinued. Transported via wheelchair to main entrance and into care of family.

## 2025-06-13 NOTE — H&P
Interval H&P:  No significant interval changes since patient was last seen in clinic.  Implications risk and benefits of DCCV was explained to the patient in great details.        _________________________________  Valarie Cervantes MD, [facility], [facility]  Cardiac Electrophysiology  [facility] [place]                             [facility] Cardiology  Cardiac Electrophysiology Clinic Care Note                  []Initial visit     [x]Established visit     Patient Name: Carlos Domingo - :1985 - MRN:[id number]  Primary Cardiologist: [facility]  Electrophysiologist: Valarie Cervantes MD     Reason for visit: PAF/persistent AFL follow up    HPI:  Mr. Domingo is a 39 y.o. male who presents for follow up, is s/p pulsed field ablation (PVI, GP, Ligament of Abdirashid) & confirmation of CTI block (2024-[facility]) & PVI, anterior mitral isthmus, lateral mitral isthmus, endocardial & epicardial ablation within the CS, & anterior LA ablation; persistent CTI block confirmed (2025-[facility]).    He's had recurrent atypical AFL post ablation.  Admitted for Tikosyn induction in 2025, converted to NSR after first dose.  Now on 375 mcg po bid.  Since then, he reports.    Today he reports having some breakthrough palpitations since being discharged from the hospital and getting AF alerts on his apple watch. He adds that his heart rate has consistently been over 100 for the past couple days. Denies chest pain,dizziness,syncope, LE edema.   Adds that he is still drinking approx 12 beers a week, and utilizes his dental device for CARLOS but is planning to get back in to sleep medicine to be evaluated for CPAP.     ECG today shows AFL RVR 127bpm, LBBB, QTC 462ms(corrected for LBBB)    Holter at [facility] in 2024 reportedly showed 60% AF/AFL burden.    Holter in 2024 showed predominantly AF/AFL with HR  bpm, avg 90 bpm.    Echo in 2023 showed LVEF 55-60% with trace TR.    BP

## 2025-06-13 NOTE — DISCHARGE INSTRUCTIONS
working too hard. You are pushing too hard if you cannot talk while you are exercising. If you become short of breath or dizzy or have chest pain, sit down and rest immediately.  If your doctor has not set you up with a cardiac rehabilitation program, talk to him or her about whether that is right for you. Cardiac rehab includes supervised exercise, help with diet and lifestyle changes, and emotional support. It may reduce your risk of future heart problems.  Check your pulse regularly. Place two fingers on the artery at the palm side of your wrist in line with your thumb. If your heartbeat seems uneven or fast, talk to your doctor.      When should you call for help?  Call 911 anytime you think you may need emergency care. For example, call if:  You have severe trouble breathing.  You cough up pink, foamy mucus and you have trouble breathing.  You have symptoms of a heart attack such as:  Chest pain or pressure.  Sweating.  Shortness of breath.  Nausea or vomiting.  Pain that spreads from the chest to the neck, jaw, or one or both shoulders or arms.  Dizziness or lightheadedness.  A fast or uneven pulse.  After calling 911, chew 1 adult-strength aspirin. Wait for an ambulance. Do not try to drive yourself.  You have signs of a stroke. These may include:  Sudden numbness, paralysis, or weakness in your face, arm, or leg, especially on only one side of your body.  New problems with walking or balance.  Sudden vision changes.  Drooling or slurred speech.  New problems speaking or understanding simple statements, or feeling confused.  A sudden, severe headache that is different from past headaches.  You cough up blood.  You vomit blood or what looks like coffee grounds.  You pass maroon or very bloody stools.  You passed out (lost consciousness)    Call your doctor now or seek immediate medical care if:  You have new or increased shortness of breath.  You are dizzy or lightheaded, or you feel like you may faint.  You

## 2025-06-14 LAB
EKG ATRIAL RATE: 125 BPM
EKG ATRIAL RATE: 89 BPM
EKG DIAGNOSIS: NORMAL
EKG DIAGNOSIS: NORMAL
EKG P AXIS: 73 DEGREES
EKG P AXIS: 96 DEGREES
EKG P-R INTERVAL: 182 MS
EKG P-R INTERVAL: 96 MS
EKG Q-T INTERVAL: 418 MS
EKG Q-T INTERVAL: 454 MS
EKG QRS DURATION: 172 MS
EKG QRS DURATION: 176 MS
EKG QTC CALCULATION (BAZETT): 552 MS
EKG QTC CALCULATION (BAZETT): 600 MS
EKG R AXIS: -37 DEGREES
EKG R AXIS: -48 DEGREES
EKG T AXIS: 78 DEGREES
EKG T AXIS: 95 DEGREES
EKG VENTRICULAR RATE: 124 BPM
EKG VENTRICULAR RATE: 89 BPM

## 2025-06-14 PROCEDURE — 93010 ELECTROCARDIOGRAM REPORT: CPT | Performed by: INTERNAL MEDICINE

## 2025-07-02 ENCOUNTER — TELEPHONE (OUTPATIENT)
Age: 40
End: 2025-07-02

## 2025-07-02 DIAGNOSIS — I48.3 TYPICAL ATRIAL FLUTTER (HCC): ICD-10-CM

## 2025-07-02 DIAGNOSIS — Z86.79 S/P ABLATION OF ATRIAL FIBRILLATION: ICD-10-CM

## 2025-07-02 DIAGNOSIS — I48.91 A-FIB (HCC): Primary | ICD-10-CM

## 2025-07-02 DIAGNOSIS — R00.2 PALPITATIONS: ICD-10-CM

## 2025-07-02 DIAGNOSIS — I48.0 PAROXYSMAL ATRIAL FIBRILLATION (HCC): ICD-10-CM

## 2025-07-02 DIAGNOSIS — I48.4 ATYPICAL ATRIAL FLUTTER (HCC): ICD-10-CM

## 2025-07-02 DIAGNOSIS — Z86.79 S/P ABLATION OF ATRIAL FLUTTER: ICD-10-CM

## 2025-07-02 DIAGNOSIS — I48.91 ATRIAL FIBRILLATION, UNSPECIFIED TYPE (HCC): ICD-10-CM

## 2025-07-02 DIAGNOSIS — I48.91 UNSPECIFIED ATRIAL FIBRILLATION (HCC): ICD-10-CM

## 2025-07-02 DIAGNOSIS — I48.92 ATRIAL FLUTTER (HCC): ICD-10-CM

## 2025-07-02 DIAGNOSIS — I48.0 PAF (PAROXYSMAL ATRIAL FIBRILLATION) (HCC): ICD-10-CM

## 2025-07-02 DIAGNOSIS — Z98.890 S/P ABLATION OF ATRIAL FIBRILLATION: ICD-10-CM

## 2025-07-02 DIAGNOSIS — Z98.890 S/P ABLATION OF ATRIAL FLUTTER: ICD-10-CM

## 2025-07-02 RX ORDER — DOFETILIDE 0.12 MG/1
375 CAPSULE ORAL EVERY 12 HOURS SCHEDULED
Qty: 180 CAPSULE | Refills: 5 | Status: SHIPPED | OUTPATIENT
Start: 2025-07-02

## 2025-07-02 NOTE — TELEPHONE ENCOUNTER
Refill per VO of Dr. Cervantes  Last appt: 10/8/2024    Requested Prescriptions     Signed Prescriptions Disp Refills    dofetilide (TIKOSYN) 125 MCG capsule 180 capsule 5     Sig: Take 3 capsules by mouth every 12 hours     Authorizing Provider: YAHAIRA CERVANTES     Ordering User: VERONICA MADRIGAL       Future Appointments   Date Time Provider Department Center   8/12/2025 10:40 AM Kermit Carey MD [facility]   8/26/2025 11:40 AM Yahaira Cervantes MD [facility]   4/14/2026 10:00 AM Brigida Fernandez MD [facility]         Returned patient call, ID verified using two patient identifiers.  Notified Mr. Domingo that I sent the prescription in to his new requested pharmacy.    Patient verbalized understanding and will call back with any other questions or concerns.

## 2025-07-02 NOTE — TELEPHONE ENCOUNTER
Patient is calling because his local pharmacy is closed and he needs a new prescription for Dofetilide 125 to go to another pharmacy so he can get his medicine. before the Holiday.    Patient needs prescription sent today or he wont be able to get his medicine until next week.    Pharmacy:  [facility]  Pharmacy  [id number] [place] [address] [phone] patient

## 2025-07-15 ENCOUNTER — TELEPHONE (OUTPATIENT)
Age: 40
End: 2025-07-15

## 2025-07-15 NOTE — TELEPHONE ENCOUNTER
----- Message from Lillian DENNISON sent at 6/13/2025  2:56 PM EDT -----    ----- Message -----  From: Valarie Cervantes MD  Sent: 6/13/2025   1:45 PM EDT  To: Lillian Barfield; Laura Jacobo RN; Birgit Velásquez    Please schedule for 2 week monitor in 1 month prior to his FU visit with me in 8/2025. Thanks.

## 2025-08-11 SDOH — HEALTH STABILITY: PHYSICAL HEALTH: ON AVERAGE, HOW MANY MINUTES DO YOU ENGAGE IN EXERCISE AT THIS LEVEL?: 40 MIN

## 2025-08-11 SDOH — HEALTH STABILITY: PHYSICAL HEALTH: ON AVERAGE, HOW MANY DAYS PER WEEK DO YOU ENGAGE IN MODERATE TO STRENUOUS EXERCISE (LIKE A BRISK WALK)?: 4 DAYS

## 2025-08-12 ENCOUNTER — OFFICE VISIT (OUTPATIENT)
Facility: CLINIC | Age: 40
End: 2025-08-12
Payer: COMMERCIAL

## 2025-08-12 VITALS
DIASTOLIC BLOOD PRESSURE: 86 MMHG | BODY MASS INDEX: 27.8 KG/M2 | HEART RATE: 68 BPM | HEIGHT: 71 IN | RESPIRATION RATE: 17 BRPM | OXYGEN SATURATION: 98 % | SYSTOLIC BLOOD PRESSURE: 142 MMHG | TEMPERATURE: 97.8 F | WEIGHT: 198.6 LBS

## 2025-08-12 DIAGNOSIS — E78.1 HYPERTRIGLYCERIDEMIA: ICD-10-CM

## 2025-08-12 DIAGNOSIS — I48.0 PAF (PAROXYSMAL ATRIAL FIBRILLATION) (HCC): Primary | ICD-10-CM

## 2025-08-12 DIAGNOSIS — Z79.01 CHRONIC ANTICOAGULATION: ICD-10-CM

## 2025-08-12 PROCEDURE — 3077F SYST BP >= 140 MM HG: CPT | Performed by: FAMILY MEDICINE

## 2025-08-12 PROCEDURE — 99203 OFFICE O/P NEW LOW 30 MIN: CPT | Performed by: FAMILY MEDICINE

## 2025-08-12 PROCEDURE — 3079F DIAST BP 80-89 MM HG: CPT | Performed by: FAMILY MEDICINE

## 2025-08-12 SDOH — ECONOMIC STABILITY: FOOD INSECURITY: WITHIN THE PAST 12 MONTHS, THE FOOD YOU BOUGHT JUST DIDN'T LAST AND YOU DIDN'T HAVE MONEY TO GET MORE.: NEVER TRUE

## 2025-08-12 SDOH — ECONOMIC STABILITY: FOOD INSECURITY: WITHIN THE PAST 12 MONTHS, YOU WORRIED THAT YOUR FOOD WOULD RUN OUT BEFORE YOU GOT MONEY TO BUY MORE.: NEVER TRUE

## 2025-08-12 ASSESSMENT — PATIENT HEALTH QUESTIONNAIRE - PHQ9
1. LITTLE INTEREST OR PLEASURE IN DOING THINGS: NOT AT ALL
2. FEELING DOWN, DEPRESSED OR HOPELESS: NOT AT ALL
SUM OF ALL RESPONSES TO PHQ QUESTIONS 1-9: 0

## 2025-08-12 ASSESSMENT — ENCOUNTER SYMPTOMS
BLOOD IN STOOL: 0
SHORTNESS OF BREATH: 0

## 2025-08-15 ENCOUNTER — TELEPHONE (OUTPATIENT)
Age: 40
End: 2025-08-15

## 2025-08-23 ENCOUNTER — PATIENT MESSAGE (OUTPATIENT)
Age: 40
End: 2025-08-23

## 2025-08-26 ENCOUNTER — OFFICE VISIT (OUTPATIENT)
Age: 40
End: 2025-08-26
Payer: COMMERCIAL

## 2025-08-26 VITALS
SYSTOLIC BLOOD PRESSURE: 148 MMHG | BODY MASS INDEX: 28 KG/M2 | HEART RATE: 74 BPM | WEIGHT: 200 LBS | DIASTOLIC BLOOD PRESSURE: 98 MMHG | OXYGEN SATURATION: 98 % | HEIGHT: 71 IN

## 2025-08-26 DIAGNOSIS — I10 ACCELERATED HYPERTENSION: ICD-10-CM

## 2025-08-26 DIAGNOSIS — I10 PRIMARY HYPERTENSION: ICD-10-CM

## 2025-08-26 DIAGNOSIS — Z51.81 ANTICOAGULATION MANAGEMENT ENCOUNTER: ICD-10-CM

## 2025-08-26 DIAGNOSIS — Z51.81 VISIT FOR MONITORING TIKOSYN THERAPY: ICD-10-CM

## 2025-08-26 DIAGNOSIS — I48.4 ATYPICAL ATRIAL FLUTTER (HCC): Primary | ICD-10-CM

## 2025-08-26 DIAGNOSIS — I48.0 PAF (PAROXYSMAL ATRIAL FIBRILLATION) (HCC): ICD-10-CM

## 2025-08-26 DIAGNOSIS — Z79.01 ANTICOAGULATION MANAGEMENT ENCOUNTER: ICD-10-CM

## 2025-08-26 DIAGNOSIS — Z86.79 S/P ABLATION OF ATRIAL FIBRILLATION: ICD-10-CM

## 2025-08-26 DIAGNOSIS — Z98.890 S/P ABLATION OF ATRIAL FIBRILLATION: ICD-10-CM

## 2025-08-26 DIAGNOSIS — Z79.899 HIGH RISK MEDICATION USE: ICD-10-CM

## 2025-08-26 DIAGNOSIS — Z79.899 VISIT FOR MONITORING TIKOSYN THERAPY: ICD-10-CM

## 2025-08-26 PROCEDURE — G2211 COMPLEX E/M VISIT ADD ON: HCPCS | Performed by: INTERNAL MEDICINE

## 2025-08-26 PROCEDURE — 3077F SYST BP >= 140 MM HG: CPT | Performed by: INTERNAL MEDICINE

## 2025-08-26 PROCEDURE — 99214 OFFICE O/P EST MOD 30 MIN: CPT | Performed by: INTERNAL MEDICINE

## 2025-08-26 PROCEDURE — 93000 ELECTROCARDIOGRAM COMPLETE: CPT | Performed by: INTERNAL MEDICINE

## 2025-08-26 PROCEDURE — 3080F DIAST BP >= 90 MM HG: CPT | Performed by: INTERNAL MEDICINE

## 2025-08-26 ASSESSMENT — PATIENT HEALTH QUESTIONNAIRE - PHQ9
SUM OF ALL RESPONSES TO PHQ QUESTIONS 1-9: 0
2. FEELING DOWN, DEPRESSED OR HOPELESS: NOT AT ALL
SUM OF ALL RESPONSES TO PHQ QUESTIONS 1-9: 0
1. LITTLE INTEREST OR PLEASURE IN DOING THINGS: NOT AT ALL

## (undated) DEVICE — PADPRO DEFIBRILLATION/PACING/CARDIOVERSION/MONITORING ELECTRODES, ADULT/CHILD GREATER THAN 10 KG RADIOTRANSPARENT ELECTRODE, PHYSIO-CONTROL QUIK-COMBO (M) 60" (152 CM): Brand: PADPRO

## (undated) DEVICE — INTRODUCER SHTH 11FR CANN L23CM DIL TIP 45MM YEL W/O MINI

## (undated) DEVICE — COVER CATH ACUNAV ULTRASOUND 5X72IN ANTI STATIC

## (undated) DEVICE — TUBING PMP FOR CARTO SYS SMARTABLATE

## (undated) DEVICE — CATHETER MAP D CRV 3-3-3-3-3 MM SPC GALAXY OCTARAY

## (undated) DEVICE — PAD GROUNDING ADLT ADH FOIL 9FT CORD UNIV

## (undated) DEVICE — MEDI-TRACE CADENCE ADULT, DEFIBRILLATION ELECTRODE -RTS  (10 PR/PK) - PHYSIO-CONTROL: Brand: MEDI-TRACE CADENCE

## (undated) DEVICE — PRESSURE MONITORING SET: Brand: TRUWAVE

## (undated) DEVICE — PROVE COVER: Brand: UNBRANDED

## (undated) DEVICE — CATHETER ABLAT 8FR L115CM 1-6-2MM SPC TIP 3.5MM DF CRV

## (undated) DEVICE — CABLE CATHETER EXTENSION

## (undated) DEVICE — SHEATH INTRO 8.5FR L71CM 8.5FR DIL GWIRE L180CM DIA0.032IN

## (undated) DEVICE — ELECTRODE,RADIOTRANSLUCENT,FOAM,5PK: Brand: MEDLINE

## (undated) DEVICE — CATHETER EP 7FR L115CM 2-8-2MM SPC TIP 2MM 10 ELECTRD F L

## (undated) DEVICE — CABLE REPROC RPRCSS 20 POLE A/20 POLE B LGHT BL 34PN DARK BL 34PN C

## (undated) DEVICE — BLANKET WRM W29.9XL79.1IN UP BODY FORC AIR MISTRAL-AIR

## (undated) DEVICE — PINNACLE INTRODUCER SHEATH: Brand: PINNACLE

## (undated) DEVICE — CATHETER ABLATION QDOT MICRO DF CURVE BIDIRECTIONAL THERMOCOUPLE

## (undated) DEVICE — CABLE CATH L10FT RED PIN CONN 34-34 FOR THERMOCOOL

## (undated) DEVICE — WASTE KIT - ST MARY: Brand: MEDLINE INDUSTRIES, INC.

## (undated) DEVICE — PERCLOSE PROGLIDE™ SUTURE-MEDIATED CLOSURE SYSTEM: Brand: PERCLOSE PROGLIDE™

## (undated) DEVICE — Device: Brand: NRG TRANSSEPTAL NEEDLE

## (undated) DEVICE — CATHETER US 8FR L90CM GRN TIP OVERLAY FOR GE-VIVID I VIVID

## (undated) DEVICE — SHEATH CATH L 65.5 CM DIA10 FR SZ 13 MM STEER STRL DISP

## (undated) DEVICE — CATHETER ABLATION PFA LOOP STRL DISP PULSESELECT

## (undated) DEVICE — ROYAL SILK SURGICAL GOWN, XXL: Brand: CONVERTORS

## (undated) DEVICE — PATCH REF EXT FOR CARTO 3 SYS (EA = 6 PACKS)

## (undated) DEVICE — THE ESOPHAGEAL COOLING DEVICE IS A THERMAL REGULATING DEVICE, INTENDED TO CONNECT TO A CINCINNATI SUB-ZERO BLANKETROL II OR BLANKETROL III HYPER-HYPOTHERMIA SYSTEM TO CONTROL PATIENT TEMPERATURE, AND PROVIDE GASTRIC DECOMPRESSION AND SUCTIONING.: Brand: ESOPHAGEAL COOLING DEVICE

## (undated) DEVICE — Device

## (undated) DEVICE — ELECTRODE PT RET AD L9FT HI MOIST COND ADH HYDRGEL CORDED

## (undated) DEVICE — HEART CATH-MRMC: Brand: MEDLINE INDUSTRIES, INC.

## (undated) DEVICE — CABLE CATH L2.7M CONNECTS TO CARTO 3 SYS PIU FOR LASSO ECO

## (undated) DEVICE — 3M™ TEGADERM™ TRANSPARENT FILM DRESSING FRAME STYLE, 1626W, 4 IN X 4-3/4 IN (10 CM X 12 CM), 50/CT 4CT/CASE: Brand: 3M™ TEGADERM™

## (undated) DEVICE — AMPLATZ EXTRA STIFF WIRE GUIDE: Brand: AMPLATZ

## (undated) DEVICE — CABLE ABLATION CATH INTFACE PULSESELECT

## (undated) DEVICE — SOUNDSTAR REPROC ECO 8F DGNSTC ULTRSND CATH USE GE IMGNG SSTM

## (undated) DEVICE — Device: Brand: RFP-100A CONNECTOR CABLE